# Patient Record
Sex: MALE | Race: ASIAN | NOT HISPANIC OR LATINO | Employment: UNEMPLOYED | URBAN - METROPOLITAN AREA
[De-identification: names, ages, dates, MRNs, and addresses within clinical notes are randomized per-mention and may not be internally consistent; named-entity substitution may affect disease eponyms.]

---

## 2018-04-03 LAB
ABSOL LYMPHOCYTES (HISTORICAL): 1.9 K/UL (ref 0.5–4)
ALBUMIN SERPL BCP-MCNC: 4.5 G/DL (ref 3–5.2)
ALP SERPL-CCNC: 56 U/L (ref 43–122)
ALT SERPL W P-5'-P-CCNC: 33 U/L (ref 9–52)
AMPHETAMINE URINE (HISTORICAL): NEGATIVE
ANION GAP SERPL CALCULATED.3IONS-SCNC: 13 MMOL/L (ref 5–14)
AST SERPL W P-5'-P-CCNC: 35 U/L (ref 17–59)
BARBITURATE URINE (HISTORICAL): NEGATIVE
BASOPHILS # BLD AUTO: 0.1 K/UL (ref 0–0.1)
BASOPHILS # BLD AUTO: 1 % (ref 0–1)
BENZODIAZEPINE URINE (HISTORICAL): NEGATIVE
BILIRUB SERPL-MCNC: 0.5 MG/DL
BILIRUB UR QL STRIP: NEGATIVE MG/DL
BUN SERPL-MCNC: 9 MG/DL (ref 5–25)
CALCIUM SERPL-MCNC: 9.5 MG/DL (ref 8.4–10.2)
CHLORIDE SERPL-SCNC: 97 MEQ/L (ref 97–108)
CK SERPL-CCNC: 312 U/L (ref 55–170)
CLARITY UR: CLEAR
CO2 SERPL-SCNC: 24 MMOL/L (ref 22–30)
COCAINE (METAB.), URINE (HISTORICAL): NEGATIVE
COLOR UR: NORMAL
CREATINE, SERUM (HISTORICAL): 0.73 MG/DL (ref 0.7–1.5)
DEPRECATED RDW RBC AUTO: 12.6 %
DRUG COMMENT (HISTORICAL): NORMAL
EGFR (HISTORICAL): >60 ML/MIN/1.73 M2
EOSINOPHIL # BLD AUTO: 0.1 K/UL (ref 0–0.4)
EOSINOPHIL NFR BLD AUTO: 2 % (ref 0–6)
GLUCOSE SERPL-MCNC: 67 MG/DL (ref 70–99)
GLUCOSE UR STRIP-MCNC: NEGATIVE MG/DL
HCT VFR BLD AUTO: 42.8 % (ref 41–53)
HGB BLD-MCNC: 14.3 G/DL (ref 13.5–17.5)
HGB UR QL STRIP.AUTO: NEGATIVE
KETONES UR STRIP-MCNC: NEGATIVE MG/DL
LEUKOCYTE ESTERASE UR QL STRIP: NEGATIVE
LYMPHOCYTES NFR BLD AUTO: 31 % (ref 25–45)
MCH RBC QN AUTO: 31.6 PG (ref 26–34)
MCHC RBC AUTO-ENTMCNC: 33.5 % (ref 31–36)
MCV RBC AUTO: 94 FL (ref 80–100)
MDMA (GC/MS) (HISTORICAL): NEGATIVE
METHADONE URINE (HISTORICAL): NEGATIVE
METHAMPHETAMINE URINE (HISTORICAL): NEGATIVE
METHYL ALCOHOL (HISTORICAL): 13 MG/DL
MONOCYTES # BLD AUTO: 0.6 K/UL (ref 0.2–0.9)
MONOCYTES NFR BLD AUTO: 9 % (ref 1–10)
NEUTROPHILS ABS COUNT (HISTORICAL): 3.4 K/UL (ref 1.8–7.8)
NEUTS SEG NFR BLD AUTO: 57 % (ref 45–65)
NITRITE UR QL STRIP: NEGATIVE
OPIATES (HISTORICAL): NEGATIVE
OXYCODONE (HISTORICAL): NEGATIVE
PH UR STRIP.AUTO: 7 [PH] (ref 4.5–8)
PHENCYCLIDINE URINE (HISTORICAL): NEGATIVE
PLATELET # BLD AUTO: 202 K/MCL (ref 150–450)
POTASSIUM SERPL-SCNC: 3.5 MEQ/L (ref 3.6–5)
PROT UR STRIP-MCNC: NEGATIVE MG/DL
RBC # BLD AUTO: 4.54 M/MCL (ref 4.5–5.9)
SODIUM SERPL-SCNC: 134 MEQ/L (ref 137–147)
SP GR UR STRIP.AUTO: 1.01 (ref 1–1.04)
THC URINE (HISTORICAL): NEGATIVE
TOTAL PROTEIN (HISTORICAL): 6.6 G/DL (ref 5.9–8.4)
TRICYCLICS URINE (HISTORICAL): NEGATIVE
UROBILINOGEN UR QL STRIP.AUTO: NEGATIVE MG/DL (ref 0–1)
WBC # BLD AUTO: 6 K/MCL (ref 4.5–11)

## 2019-07-04 ENCOUNTER — TELEPHONE (OUTPATIENT)
Dept: OTHER | Facility: OTHER | Age: 24
End: 2019-07-04

## 2019-07-04 NOTE — TELEPHONE ENCOUNTER
Mr  Diana Patel ( 1995) was admitted to [facility] at [facility] in [place] earlier today  Mr Raphael Flores was calling looking to speak with Dr Eneida Fowler who was listed on his card as his contact MD  Informed Mr Arnold Meza that Dr Eneida Fowler is not on call today, and there was no documentation on him since 2018    Mr Barrett Derrick will try to contact Dr Eneida Fowler tomorrow,

## 2019-07-29 ENCOUNTER — HOSPITAL ENCOUNTER (EMERGENCY)
Facility: HOSPITAL | Age: 24
Discharge: HOME | End: 2019-07-29
Attending: EMERGENCY MEDICINE
Payer: COMMERCIAL

## 2019-07-29 VITALS
SYSTOLIC BLOOD PRESSURE: 124 MMHG | RESPIRATION RATE: 14 BRPM | TEMPERATURE: 98.3 F | DIASTOLIC BLOOD PRESSURE: 73 MMHG | WEIGHT: 130 LBS | OXYGEN SATURATION: 99 % | HEART RATE: 110 BPM

## 2019-07-29 DIAGNOSIS — Z76.0 MEDICATION REFILL: Primary | ICD-10-CM

## 2019-07-29 LAB
ETHANOL EXG-MCNC: 0 MG/DL
LITHIUM SERPL-SCNC: 0.9 MMOL/L (ref 0.5–1)

## 2019-07-29 PROCEDURE — 36415 COLL VENOUS BLD VENIPUNCTURE: CPT | Performed by: PHYSICIAN ASSISTANT

## 2019-07-29 PROCEDURE — 80178 ASSAY OF LITHIUM: CPT | Performed by: PHYSICIAN ASSISTANT

## 2019-07-29 PROCEDURE — 82075 ASSAY OF BREATH ETHANOL: CPT | Performed by: PHYSICIAN ASSISTANT

## 2019-07-29 PROCEDURE — 99282 EMERGENCY DEPT VISIT SF MDM: CPT

## 2019-07-29 PROCEDURE — 99283 EMERGENCY DEPT VISIT LOW MDM: CPT | Performed by: PHYSICIAN ASSISTANT

## 2019-07-29 RX ORDER — CLONAZEPAM 0.5 MG/1
0.5 TABLET ORAL 2 TIMES DAILY
Qty: 14 TABLET | Refills: 0 | Status: SHIPPED | OUTPATIENT
Start: 2019-07-29 | End: 2019-08-05

## 2019-07-29 RX ORDER — OLANZAPINE 5 MG/1
5 TABLET ORAL DAILY
COMMUNITY
End: 2022-05-25

## 2019-07-29 RX ORDER — LITHIUM CARBONATE 600 MG/1
600 CAPSULE ORAL 2 TIMES DAILY WITH MEALS
Qty: 14 CAPSULE | Refills: 0 | Status: SHIPPED | OUTPATIENT
Start: 2019-07-29 | End: 2019-08-05

## 2019-07-29 RX ORDER — OLANZAPINE 15 MG/1
15 TABLET ORAL
Qty: 7 TABLET | Refills: 0 | Status: SHIPPED | OUTPATIENT
Start: 2019-07-29 | End: 2019-08-05

## 2019-07-29 RX ORDER — OLANZAPINE 15 MG/1
15 TABLET ORAL
COMMUNITY
End: 2022-05-25

## 2019-07-29 RX ORDER — CLONAZEPAM 0.5 MG/1
0.5 TABLET ORAL 2 TIMES DAILY
COMMUNITY
End: 2022-05-25

## 2019-07-29 RX ORDER — LITHIUM CARBONATE 300 MG
600 TABLET ORAL 2 TIMES DAILY
COMMUNITY
End: 2022-05-25

## 2019-07-29 NOTE — ED NOTES
Patient requesting referrals for therapist and psychiatrist   [facility]   Patient denies suicidal and homicidal ideations at this time

## 2019-07-29 NOTE — ED PROVIDER NOTES
History  Chief Complaint   Patient presents with    Medication Refill     lithium 600mg BID, olanzapine 15mg HS, clonazepam 1mg BID  also would like outpatient referrals  denies SI/HI/hallucinations  This is a 54-year-old male patient who just moved back from [place]  He has a history of bipolar  States he is currently trying to find a doctor but does not have [address], [address] would like to see our crisis worker  She was consult  He denies any homicidal suicidal ideations no hallucinations  He calling take lithium 600 mg b i d ,olanzapine 15mg hs and clonazepam 0 5 mg b i d  All confirmed on his bottles  The PS  was utilized wound he did have his clonazepam 0 5 mg b i d  Refill 15 days ago he should be out  It is healing medication that was on there  No fever no chills no headache blurred vision double vision cough congestion sore throat nausea vomiting diarrhea abdominal pain no chest pain or shortness of breath  No urinary symptoms  He is not actively run out of his that he was medicine is not going through withdrawal           Prior to Admission Medications   Prescriptions Last Dose Informant Patient Reported? Taking? OLANZapine (ZyPREXA) 15 mg tablet  Self Yes Yes   Sig: Take 15 mg by mouth daily at bedtime   OLANZapine (ZyPREXA) 5 mg tablet  Self Yes Yes   Sig: Take 5 mg by mouth daily   clonazePAM (KlonoPIN) 0 5 mg tablet  Self Yes Yes   Sig: Take 0 5 mg by mouth 2 (two) times a day   lithium 300 MG tablet  Self Yes Yes   Sig: Take 600 mg by mouth 2 (two) times a day      Facility-Administered Medications: None       Past Medical History:   Diagnosis Date    Bipolar 1 disorder ([facility] )     Psychiatric disorder     Psychosis ([facility] )        History reviewed  No pertinent surgical history  History reviewed  No pertinent family history  I have reviewed and agree with the history as documented      Social History     Tobacco Use    Smoking status: Current Every Day Smoker     Packs/day: 0 20 Types: Cigarettes    Smokeless tobacco: Never Used   Substance Use Topics    Alcohol use: Yes    Drug use: Not Currently     Types: Marijuana        Review of Systems   All other systems reviewed and are negative  Physical Exam  Physical Exam   Constitutional: He appears well-developed and well-nourished  HENT:   Head: Normocephalic and atraumatic  Right Ear: External ear normal    Left Ear: External ear normal    Nose: Nose normal    Mouth/Throat: Oropharynx is clear and moist    Eyes: Pupils are equal, round, and reactive to light  Conjunctivae are normal    Neck: Normal range of motion  Neck supple  Cardiovascular: Normal rate and regular rhythm  Pulmonary/Chest: Effort normal and breath sounds normal    Abdominal: Soft  Bowel sounds are normal  There is no tenderness  Neurological: He is alert  He displays normal reflexes  No cranial nerve deficit or sensory deficit  He exhibits normal muscle tone  Coordination normal    Skin: Skin is warm  Psychiatric: He has a normal mood and affect  His speech is normal and behavior is normal  Judgment and thought content normal  His mood appears not anxious  He is not actively hallucinating  Cognition and memory are normal  He is attentive  Nursing note and vitals reviewed  Vital Signs  ED Triage Vitals [07/29/19 0933]   Temperature Pulse Respirations Blood Pressure SpO2   98 3 °F (36 8 °C) (!) 110 14 124/73 99 %      Temp Source Heart Rate Source Patient Position - Orthostatic VS BP Location FiO2 (%)   Oral Monitor -- Right arm --      Pain Score       3           Vitals:    07/29/19 0933   BP: 124/73   Pulse: (!) 110         Visual Acuity      ED Medications  Medications - No data to display    Diagnostic Studies  Results Reviewed     Procedure Component Value Units Date/Time    POCT alcohol breath test [[id number]]     Lab Status:  No result     Lithium level [[id number]] Collected:  07/29/19 1005    Lab Status:   In process Specimen:  Blood from Arm, Right Updated:  07/29/19 1008                 No orders to display              Procedures  Procedures       ED Course  ED Course as of Jul 29 1024   Mon Jul 29, 2019   1022 Patient seen and evaluated by crisis  Was given outpatient follow-up  Not homicidal or suicidal                                  MDM    Disposition  Final diagnoses:   Medication refill     Time reflects when diagnosis was documented in both MDM as applicable and the Disposition within this note     Time User Action Codes Description Comment    7/29/2019 10:03 AM Alejobolivar Alex Add [Z76 0] Medication refill       ED Disposition     ED Disposition Condition Date/Time Comment    Discharge Stable Mon Jul 29, 2019 10:23 AM Wale Quinteros discharge to home/self care  Follow-up Information    None         Patient's Medications   Discharge Prescriptions    CLONAZEPAM (KLONOPIN) 0 5 MG TABLET    Take 1 tablet (0 5 mg total) by mouth 2 (two) times a day for 7 days       Start Date: 7/29/2019 End Date: 8/5/2019       Order Dose: 0 5 mg       Quantity: 14 tablet    Refills: 0    LITHIUM 600 MG CAPSULE    Take 1 capsule (600 mg total) by mouth 2 (two) times a day with meals for 7 days       Start Date: 7/29/2019 End Date: 8/5/2019       Order Dose: 600 mg       Quantity: 14 capsule    Refills: 0    OLANZAPINE (ZYPREXA) 15 MG TABLET    Take 1 tablet (15 mg total) by mouth daily at bedtime for 7 days       Start Date: 7/29/2019 End Date: 8/5/2019       Order Dose: 15 mg       Quantity: 7 tablet    Refills: 0     No discharge procedures on file      ED Provider  Electronically Signed by           Issa Marroquin PA-C  07/29/19 1024

## 2021-11-27 ENCOUNTER — HOSPITAL ENCOUNTER (EMERGENCY)
Facility: HOSPITAL | Age: 26
Discharge: HOME | End: 2021-11-28
Attending: EMERGENCY MEDICINE
Payer: COMMERCIAL

## 2021-11-27 DIAGNOSIS — F31.9 BIPOLAR DISORDER (HCC): Primary | ICD-10-CM

## 2021-11-27 PROCEDURE — 99285 EMERGENCY DEPT VISIT HI MDM: CPT

## 2021-11-27 PROCEDURE — 96372 THER/PROPH/DIAG INJ SC/IM: CPT

## 2021-11-27 RX ORDER — BENZTROPINE MESYLATE 1 MG/ML
2 INJECTION INTRAMUSCULAR; INTRAVENOUS ONCE
Status: COMPLETED | OUTPATIENT
Start: 2021-11-28 | End: 2021-11-27

## 2021-11-27 RX ORDER — LORAZEPAM 2 MG/ML
2 INJECTION INTRAMUSCULAR ONCE
Status: COMPLETED | OUTPATIENT
Start: 2021-11-28 | End: 2021-11-27

## 2021-11-27 RX ORDER — HALOPERIDOL 5 MG/ML
5 INJECTION INTRAMUSCULAR ONCE
Status: COMPLETED | OUTPATIENT
Start: 2021-11-28 | End: 2021-11-27

## 2021-11-27 RX ADMIN — HALOPERIDOL LACTATE 5 MG: 5 INJECTION, SOLUTION INTRAMUSCULAR at 23:58

## 2021-11-27 RX ADMIN — LORAZEPAM 2 MG: 2 INJECTION INTRAMUSCULAR; INTRAVENOUS at 23:59

## 2021-11-27 RX ADMIN — BENZTROPINE MESYLATE 2 MG: 1 INJECTION INTRAMUSCULAR; INTRAVENOUS at 23:59

## 2021-11-28 VITALS
DIASTOLIC BLOOD PRESSURE: 58 MMHG | SYSTOLIC BLOOD PRESSURE: 97 MMHG | HEART RATE: 78 BPM | RESPIRATION RATE: 15 BRPM | OXYGEN SATURATION: 100 % | TEMPERATURE: 98.4 F

## 2021-11-28 LAB
ALBUMIN SERPL BCP-MCNC: 4.1 G/DL (ref 3.5–5)
ALP SERPL-CCNC: 67 U/L (ref 46–116)
ALT SERPL W P-5'-P-CCNC: 20 U/L (ref 12–78)
AMPHETAMINES SERPL QL SCN: NEGATIVE
ANION GAP SERPL CALCULATED.3IONS-SCNC: 11 MMOL/L (ref 4–13)
APAP SERPL-MCNC: 6.9 UG/ML (ref 10–20)
AST SERPL W P-5'-P-CCNC: 26 U/L (ref 5–45)
BARBITURATES UR QL: NEGATIVE
BASOPHILS # BLD AUTO: 0.06 THOUSANDS/ΜL (ref 0–0.1)
BASOPHILS NFR BLD AUTO: 1 % (ref 0–1)
BENZODIAZ UR QL: NEGATIVE
BILIRUB SERPL-MCNC: 0.43 MG/DL (ref 0.2–1)
BUN SERPL-MCNC: 11 MG/DL (ref 5–25)
CALCIUM SERPL-MCNC: 8.7 MG/DL (ref 8.3–10.1)
CHLORIDE SERPL-SCNC: 105 MMOL/L (ref 100–108)
CK MB SERPL-MCNC: 1.2 NG/ML (ref 0–5)
CK MB SERPL-MCNC: 1.3 NG/ML (ref 0–5)
CK MB SERPL-MCNC: <1 % (ref 0–2.5)
CK MB SERPL-MCNC: <1 % (ref 0–2.5)
CK SERPL-CCNC: 588 U/L (ref 39–308)
CK SERPL-CCNC: 666 U/L (ref 39–308)
CO2 SERPL-SCNC: 26 MMOL/L (ref 21–32)
COCAINE UR QL: NEGATIVE
CREAT SERPL-MCNC: 1.14 MG/DL (ref 0.6–1.3)
EOSINOPHIL # BLD AUTO: 0.08 THOUSAND/ΜL (ref 0–0.61)
EOSINOPHIL NFR BLD AUTO: 1 % (ref 0–6)
ERYTHROCYTE [DISTWIDTH] IN BLOOD BY AUTOMATED COUNT: 11.6 % (ref 11.6–15.1)
ETHANOL SERPL-MCNC: 3 MG/DL (ref 0–3)
FLUAV RNA RESP QL NAA+PROBE: NEGATIVE
FLUBV RNA RESP QL NAA+PROBE: NEGATIVE
GFR SERPL CREATININE-BSD FRML MDRD: 88 ML/MIN/1.73SQ M
GLUCOSE SERPL-MCNC: 99 MG/DL (ref 65–140)
HCT VFR BLD AUTO: 43.2 % (ref 36.5–49.3)
HGB BLD-MCNC: 14.8 G/DL (ref 12–17)
IMM GRANULOCYTES # BLD AUTO: 0.02 THOUSAND/UL (ref 0–0.2)
IMM GRANULOCYTES NFR BLD AUTO: 0 % (ref 0–2)
LYMPHOCYTES # BLD AUTO: 0.97 THOUSANDS/ΜL (ref 0.6–4.47)
LYMPHOCYTES NFR BLD AUTO: 10 % (ref 14–44)
MCH RBC QN AUTO: 31.2 PG (ref 26.8–34.3)
MCHC RBC AUTO-ENTMCNC: 34.3 G/DL (ref 31.4–37.4)
MCV RBC AUTO: 91 FL (ref 82–98)
METHADONE UR QL: NEGATIVE
MONOCYTES # BLD AUTO: 0.68 THOUSAND/ΜL (ref 0.17–1.22)
MONOCYTES NFR BLD AUTO: 7 % (ref 4–12)
NEUTROPHILS # BLD AUTO: 8.3 THOUSANDS/ΜL (ref 1.85–7.62)
NEUTS SEG NFR BLD AUTO: 81 % (ref 43–75)
NRBC BLD AUTO-RTO: 0 /100 WBCS
OPIATES UR QL SCN: NEGATIVE
OXYCODONE+OXYMORPHONE UR QL SCN: NEGATIVE
PCP UR QL: NEGATIVE
PLATELET # BLD AUTO: 202 THOUSANDS/UL (ref 149–390)
PMV BLD AUTO: 9.5 FL (ref 8.9–12.7)
POTASSIUM SERPL-SCNC: 4.1 MMOL/L (ref 3.5–5.3)
PROT SERPL-MCNC: 6.4 G/DL (ref 6.4–8.2)
RBC # BLD AUTO: 4.75 MILLION/UL (ref 3.88–5.62)
RSV RNA RESP QL NAA+PROBE: NEGATIVE
SALICYLATES SERPL-MCNC: <3 MG/DL (ref 3–20)
SARS-COV-2 RNA RESP QL NAA+PROBE: NEGATIVE
SODIUM SERPL-SCNC: 142 MMOL/L (ref 136–145)
THC UR QL: POSITIVE
VALPROATE SERPL-MCNC: 6 UG/ML (ref 50–100)
WBC # BLD AUTO: 10.11 THOUSAND/UL (ref 4.31–10.16)

## 2021-11-28 PROCEDURE — 82550 ASSAY OF CK (CPK): CPT | Performed by: EMERGENCY MEDICINE

## 2021-11-28 PROCEDURE — 80179 DRUG ASSAY SALICYLATE: CPT | Performed by: EMERGENCY MEDICINE

## 2021-11-28 PROCEDURE — 80307 DRUG TEST PRSMV CHEM ANLYZR: CPT | Performed by: EMERGENCY MEDICINE

## 2021-11-28 PROCEDURE — 82077 ASSAY SPEC XCP UR&BREATH IA: CPT | Performed by: EMERGENCY MEDICINE

## 2021-11-28 PROCEDURE — 36415 COLL VENOUS BLD VENIPUNCTURE: CPT | Performed by: EMERGENCY MEDICINE

## 2021-11-28 PROCEDURE — 0241U HB NFCT DS VIR RESP RNA 4 TRGT: CPT | Performed by: EMERGENCY MEDICINE

## 2021-11-28 PROCEDURE — 99285 EMERGENCY DEPT VISIT HI MDM: CPT | Performed by: EMERGENCY MEDICINE

## 2021-11-28 PROCEDURE — 82553 CREATINE MB FRACTION: CPT | Performed by: EMERGENCY MEDICINE

## 2021-11-28 PROCEDURE — 85025 COMPLETE CBC W/AUTO DIFF WBC: CPT | Performed by: EMERGENCY MEDICINE

## 2021-11-28 PROCEDURE — 80164 ASSAY DIPROPYLACETIC ACD TOT: CPT | Performed by: EMERGENCY MEDICINE

## 2021-11-28 PROCEDURE — 96360 HYDRATION IV INFUSION INIT: CPT

## 2021-11-28 PROCEDURE — 80053 COMPREHEN METABOLIC PANEL: CPT | Performed by: EMERGENCY MEDICINE

## 2021-11-28 PROCEDURE — 80143 DRUG ASSAY ACETAMINOPHEN: CPT | Performed by: EMERGENCY MEDICINE

## 2021-11-28 RX ORDER — LORAZEPAM 1 MG/1
2 TABLET ORAL ONCE
Status: COMPLETED | OUTPATIENT
Start: 2021-11-28 | End: 2021-11-28

## 2021-11-28 RX ADMIN — SODIUM CHLORIDE 1000 ML: 0.9 INJECTION, SOLUTION INTRAVENOUS at 02:23

## 2021-11-28 RX ADMIN — LORAZEPAM 2 MG: 1 TABLET ORAL at 10:03

## 2022-04-17 ENCOUNTER — HOSPITAL ENCOUNTER (EMERGENCY)
Facility: HOSPITAL | Age: 27
End: 2022-04-18
Attending: EMERGENCY MEDICINE | Admitting: EMERGENCY MEDICINE
Payer: COMMERCIAL

## 2022-04-17 DIAGNOSIS — F29 PSYCHOSIS (HCC): ICD-10-CM

## 2022-04-17 DIAGNOSIS — R45.851 SUICIDAL IDEATIONS: ICD-10-CM

## 2022-04-17 DIAGNOSIS — R45.1 AGITATION: Primary | ICD-10-CM

## 2022-04-17 LAB
AMPHETAMINES SERPL QL SCN: NEGATIVE
BARBITURATES UR QL: NEGATIVE
BENZODIAZ UR QL: NEGATIVE
COCAINE UR QL: NEGATIVE
ETHANOL EXG-MCNC: 0 MG/DL
FLUAV RNA RESP QL NAA+PROBE: NEGATIVE
FLUBV RNA RESP QL NAA+PROBE: NEGATIVE
METHADONE UR QL: NEGATIVE
OPIATES UR QL SCN: NEGATIVE
OXYCODONE+OXYMORPHONE UR QL SCN: NEGATIVE
PCP UR QL: NEGATIVE
RSV RNA RESP QL NAA+PROBE: NEGATIVE
SARS-COV-2 RNA RESP QL NAA+PROBE: NEGATIVE
THC UR QL: POSITIVE

## 2022-04-17 PROCEDURE — 0241U HB NFCT DS VIR RESP RNA 4 TRGT: CPT | Performed by: EMERGENCY MEDICINE

## 2022-04-17 PROCEDURE — 80307 DRUG TEST PRSMV CHEM ANLYZR: CPT | Performed by: EMERGENCY MEDICINE

## 2022-04-17 PROCEDURE — 99285 EMERGENCY DEPT VISIT HI MDM: CPT

## 2022-04-17 PROCEDURE — 99284 EMERGENCY DEPT VISIT MOD MDM: CPT | Performed by: EMERGENCY MEDICINE

## 2022-04-17 PROCEDURE — 82075 ASSAY OF BREATH ETHANOL: CPT | Performed by: EMERGENCY MEDICINE

## 2022-04-17 RX ORDER — OLANZAPINE 5 MG/1
10 TABLET, ORALLY DISINTEGRATING ORAL ONCE
Status: COMPLETED | OUTPATIENT
Start: 2022-04-17 | End: 2022-04-17

## 2022-04-17 RX ORDER — LORAZEPAM 1 MG/1
1 TABLET ORAL ONCE
Status: COMPLETED | OUTPATIENT
Start: 2022-04-17 | End: 2022-04-17

## 2022-04-17 RX ADMIN — OLANZAPINE 10 MG: 5 TABLET, ORALLY DISINTEGRATING ORAL at 12:01

## 2022-04-17 RX ADMIN — OLANZAPINE 10 MG: 5 TABLET, ORALLY DISINTEGRATING ORAL at 18:53

## 2022-04-17 RX ADMIN — LORAZEPAM 1 MG: 1 TABLET ORAL at 18:53
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
## 2022-04-17 NOTE — ED NOTES
While receiving report, RN heard pt begin to scream/yell while laying in bed  Pt jumped out of bed and walked in hallway and continued to yell/scream  Pt did not display any physically aggressive behavior towards self/others  Provider and security called to bedside at this time        John Alvarado RN  04/17/22 1943

## 2022-04-17 NOTE — ED NOTES
Pt occasionally yelling out but laying calmly in bed  Breathing non-labored  No signs of distress        Cydney Levine RN  04/17/22 1944

## 2022-04-17 NOTE — ED NOTES
CW aware of patient and will do evaluation as soon as possible        Keisha Leigh RN  04/17/22 9034

## 2022-04-17 NOTE — ED NOTES
Pt was agreeable to going back to room and taking po medications  Pt was very cooperative with staff, but continued to yell       Aubree Fernández RN  04/17/22 1944

## 2022-04-18 ENCOUNTER — HOSPITAL ENCOUNTER (INPATIENT)
Facility: HOSPITAL | Age: 27
LOS: 37 days | Discharge: HOME | DRG: 750 | End: 2022-05-25
Attending: HOSPITALIST | Admitting: HOSPITALIST
Payer: COMMERCIAL

## 2022-04-18 VITALS
RESPIRATION RATE: 16 BRPM | HEART RATE: 80 BPM | SYSTOLIC BLOOD PRESSURE: 136 MMHG | DIASTOLIC BLOOD PRESSURE: 83 MMHG | TEMPERATURE: 98 F | OXYGEN SATURATION: 100 %

## 2022-04-18 DIAGNOSIS — E53.8 VITAMIN B12 DEFICIENCY: ICD-10-CM

## 2022-04-18 DIAGNOSIS — R45.1 AGITATION: ICD-10-CM

## 2022-04-18 DIAGNOSIS — F29 PSYCHOSIS (HCC): ICD-10-CM

## 2022-04-18 DIAGNOSIS — Z72.0 TOBACCO ABUSE: ICD-10-CM

## 2022-04-18 DIAGNOSIS — F25.0 SCHIZOAFFECTIVE DISORDER, BIPOLAR TYPE (HCC): Primary | Chronic | ICD-10-CM

## 2022-04-18 DIAGNOSIS — E55.9 VITAMIN D DEFICIENCY: ICD-10-CM

## 2022-04-18 DIAGNOSIS — I10 HYPERTENSION: ICD-10-CM

## 2022-04-18 RX ORDER — CLONAZEPAM 0.5 MG/1
0.5 TABLET ORAL 2 TIMES DAILY
Status: CANCELLED | OUTPATIENT
Start: 2022-04-18

## 2022-04-18 RX ORDER — HALOPERIDOL 5 MG
5 TABLET ORAL
Status: DISCONTINUED | OUTPATIENT
Start: 2022-04-18 | End: 2022-04-19

## 2022-04-18 RX ORDER — HALOPERIDOL 5 MG/ML
2.5 INJECTION INTRAMUSCULAR
Status: DISCONTINUED | OUTPATIENT
Start: 2022-04-18 | End: 2022-05-25 | Stop reason: HOSPADM

## 2022-04-18 RX ORDER — HALOPERIDOL 5 MG/ML
5 INJECTION INTRAMUSCULAR
Status: DISCONTINUED | OUTPATIENT
Start: 2022-04-18 | End: 2022-04-19

## 2022-04-18 RX ORDER — CLONAZEPAM 0.5 MG/1
0.5 TABLET ORAL 2 TIMES DAILY
Status: DISCONTINUED | OUTPATIENT
Start: 2022-04-18 | End: 2022-04-18 | Stop reason: HOSPADM

## 2022-04-18 RX ORDER — ACETAMINOPHEN 325 MG/1
650 TABLET ORAL EVERY 6 HOURS PRN
Status: DISCONTINUED | OUTPATIENT
Start: 2022-04-18 | End: 2022-05-25 | Stop reason: HOSPADM

## 2022-04-18 RX ORDER — ACETAMINOPHEN 325 MG/1
975 TABLET ORAL EVERY 6 HOURS PRN
Status: DISCONTINUED | OUTPATIENT
Start: 2022-04-18 | End: 2022-05-25 | Stop reason: HOSPADM

## 2022-04-18 RX ORDER — OLANZAPINE 5 MG/1
5 TABLET ORAL DAILY
Status: DISCONTINUED | OUTPATIENT
Start: 2022-04-19 | End: 2022-04-19

## 2022-04-18 RX ORDER — LORAZEPAM 2 MG/ML
2 INJECTION INTRAMUSCULAR
Status: CANCELLED | OUTPATIENT
Start: 2022-04-18

## 2022-04-18 RX ORDER — TRAZODONE HYDROCHLORIDE 100 MG/1
100 TABLET ORAL
Status: CANCELLED | OUTPATIENT
Start: 2022-04-18

## 2022-04-18 RX ORDER — LORAZEPAM 1 MG/1
1 TABLET ORAL EVERY 6 HOURS PRN
Status: DISCONTINUED | OUTPATIENT
Start: 2022-04-18 | End: 2022-05-25 | Stop reason: HOSPADM

## 2022-04-18 RX ORDER — BENZTROPINE MESYLATE 1 MG/1
1 TABLET ORAL EVERY 6 HOURS PRN
Status: CANCELLED | OUTPATIENT
Start: 2022-04-18

## 2022-04-18 RX ORDER — HYDROXYZINE HYDROCHLORIDE 25 MG/1
25 TABLET, FILM COATED ORAL
Status: CANCELLED | OUTPATIENT
Start: 2022-04-18

## 2022-04-18 RX ORDER — OLANZAPINE 5 MG/1
10 TABLET, ORALLY DISINTEGRATING ORAL ONCE
Status: COMPLETED | OUTPATIENT
Start: 2022-04-18 | End: 2022-04-18

## 2022-04-18 RX ORDER — ACETAMINOPHEN 325 MG/1
650 TABLET ORAL EVERY 4 HOURS PRN
Status: DISCONTINUED | OUTPATIENT
Start: 2022-04-18 | End: 2022-04-18 | Stop reason: HOSPADM

## 2022-04-18 RX ORDER — MAGNESIUM HYDROXIDE/ALUMINUM HYDROXICE/SIMETHICONE 120; 1200; 1200 MG/30ML; MG/30ML; MG/30ML
30 SUSPENSION ORAL EVERY 4 HOURS PRN
Status: DISCONTINUED | OUTPATIENT
Start: 2022-04-18 | End: 2022-05-25 | Stop reason: HOSPADM

## 2022-04-18 RX ORDER — HYDROXYZINE 50 MG/1
50 TABLET, FILM COATED ORAL
Status: DISCONTINUED | OUTPATIENT
Start: 2022-04-18 | End: 2022-05-25 | Stop reason: HOSPADM

## 2022-04-18 RX ORDER — BENZTROPINE MESYLATE 1 MG/1
1 TABLET ORAL EVERY 6 HOURS PRN
Status: DISCONTINUED | OUTPATIENT
Start: 2022-04-18 | End: 2022-05-25 | Stop reason: HOSPADM

## 2022-04-18 RX ORDER — ACETAMINOPHEN 325 MG/1
650 TABLET ORAL EVERY 4 HOURS PRN
Status: DISCONTINUED | OUTPATIENT
Start: 2022-04-18 | End: 2022-05-25 | Stop reason: HOSPADM

## 2022-04-18 RX ORDER — HALOPERIDOL 2 MG/1
2 TABLET ORAL
Status: DISCONTINUED | OUTPATIENT
Start: 2022-04-18 | End: 2022-04-19

## 2022-04-18 RX ORDER — BENZTROPINE MESYLATE 1 MG/ML
1 INJECTION INTRAMUSCULAR; INTRAVENOUS
Status: CANCELLED | OUTPATIENT
Start: 2022-04-18

## 2022-04-18 RX ORDER — LORAZEPAM 2 MG/ML
2 INJECTION INTRAMUSCULAR
Status: DISCONTINUED | OUTPATIENT
Start: 2022-04-18 | End: 2022-05-25 | Stop reason: HOSPADM

## 2022-04-18 RX ORDER — OLANZAPINE 10 MG/1
10 TABLET ORAL
Status: DISCONTINUED | OUTPATIENT
Start: 2022-04-19 | End: 2022-04-27

## 2022-04-18 RX ORDER — BENZTROPINE MESYLATE 1 MG/ML
1 INJECTION INTRAMUSCULAR; INTRAVENOUS
Status: DISCONTINUED | OUTPATIENT
Start: 2022-04-18 | End: 2022-05-25 | Stop reason: HOSPADM

## 2022-04-18 RX ORDER — OLANZAPINE 5 MG/1
5 TABLET ORAL DAILY
Status: CANCELLED | OUTPATIENT
Start: 2022-04-19

## 2022-04-18 RX ORDER — LORAZEPAM 1 MG/1
1 TABLET ORAL EVERY 6 HOURS PRN
Status: CANCELLED | OUTPATIENT
Start: 2022-04-18

## 2022-04-18 RX ORDER — POLYETHYLENE GLYCOL 3350 17 G/17G
17 POWDER, FOR SOLUTION ORAL DAILY PRN
Status: CANCELLED | OUTPATIENT
Start: 2022-04-18

## 2022-04-18 RX ORDER — POLYETHYLENE GLYCOL 3350 17 G/17G
17 POWDER, FOR SOLUTION ORAL DAILY PRN
Status: DISCONTINUED | OUTPATIENT
Start: 2022-04-18 | End: 2022-05-25 | Stop reason: HOSPADM

## 2022-04-18 RX ORDER — HALOPERIDOL 5 MG/ML
2.5 INJECTION INTRAMUSCULAR
Status: CANCELLED | OUTPATIENT
Start: 2022-04-18

## 2022-04-18 RX ORDER — ACETAMINOPHEN 325 MG/1
650 TABLET ORAL EVERY 6 HOURS PRN
Status: CANCELLED | OUTPATIENT
Start: 2022-04-18

## 2022-04-18 RX ORDER — CLONAZEPAM 0.5 MG/1
0.5 TABLET ORAL 2 TIMES DAILY
Status: DISCONTINUED | OUTPATIENT
Start: 2022-04-19 | End: 2022-04-26

## 2022-04-18 RX ORDER — LORAZEPAM 2 MG/ML
1 INJECTION INTRAMUSCULAR
Status: CANCELLED | OUTPATIENT
Start: 2022-04-18

## 2022-04-18 RX ORDER — LORAZEPAM 1 MG/1
2 TABLET ORAL ONCE
Status: COMPLETED | OUTPATIENT
Start: 2022-04-18 | End: 2022-04-18

## 2022-04-18 RX ORDER — ACETAMINOPHEN 325 MG/1
975 TABLET ORAL EVERY 6 HOURS PRN
Status: CANCELLED | OUTPATIENT
Start: 2022-04-18

## 2022-04-18 RX ORDER — HALOPERIDOL 5 MG
5 TABLET ORAL
Status: CANCELLED | OUTPATIENT
Start: 2022-04-18

## 2022-04-18 RX ORDER — LITHIUM CARBONATE 300 MG/1
300 TABLET, FILM COATED, EXTENDED RELEASE ORAL EVERY 12 HOURS SCHEDULED
Status: CANCELLED | OUTPATIENT
Start: 2022-04-18

## 2022-04-18 RX ORDER — BENZTROPINE MESYLATE 1 MG/ML
0.5 INJECTION INTRAMUSCULAR; INTRAVENOUS
Status: DISCONTINUED | OUTPATIENT
Start: 2022-04-18 | End: 2022-05-25 | Stop reason: HOSPADM

## 2022-04-18 RX ORDER — HYDROXYZINE HYDROCHLORIDE 25 MG/1
25 TABLET, FILM COATED ORAL
Status: DISCONTINUED | OUTPATIENT
Start: 2022-04-18 | End: 2022-05-25 | Stop reason: HOSPADM

## 2022-04-18 RX ORDER — HALOPERIDOL 1 MG/1
2 TABLET ORAL
Status: CANCELLED | OUTPATIENT
Start: 2022-04-18

## 2022-04-18 RX ORDER — LORAZEPAM 2 MG/ML
1 INJECTION INTRAMUSCULAR EVERY 4 HOURS PRN
Status: DISCONTINUED | OUTPATIENT
Start: 2022-04-18 | End: 2022-05-25 | Stop reason: HOSPADM

## 2022-04-18 RX ORDER — OLANZAPINE 5 MG/1
5 TABLET ORAL DAILY
Status: DISCONTINUED | OUTPATIENT
Start: 2022-04-18 | End: 2022-04-18 | Stop reason: HOSPADM

## 2022-04-18 RX ORDER — LITHIUM CARBONATE 300 MG/1
300 TABLET, FILM COATED, EXTENDED RELEASE ORAL EVERY 12 HOURS SCHEDULED
Status: DISCONTINUED | OUTPATIENT
Start: 2022-04-19 | End: 2022-04-21

## 2022-04-18 RX ORDER — OLANZAPINE 5 MG/1
10 TABLET ORAL
Status: DISCONTINUED | OUTPATIENT
Start: 2022-04-18 | End: 2022-04-18 | Stop reason: HOSPADM

## 2022-04-18 RX ORDER — LORAZEPAM 2 MG/ML
1 INJECTION INTRAMUSCULAR
Status: DISCONTINUED | OUTPATIENT
Start: 2022-04-18 | End: 2022-05-25 | Stop reason: HOSPADM

## 2022-04-18 RX ORDER — OLANZAPINE 5 MG/1
10 TABLET ORAL
Status: CANCELLED | OUTPATIENT
Start: 2022-04-18

## 2022-04-18 RX ORDER — ACETAMINOPHEN 325 MG/1
650 TABLET ORAL EVERY 4 HOURS PRN
Status: CANCELLED | OUTPATIENT
Start: 2022-04-18

## 2022-04-18 RX ORDER — BENZTROPINE MESYLATE 1 MG/ML
0.5 INJECTION INTRAMUSCULAR; INTRAVENOUS
Status: CANCELLED | OUTPATIENT
Start: 2022-04-18

## 2022-04-18 RX ORDER — HALOPERIDOL 5 MG/ML
5 INJECTION INTRAMUSCULAR
Status: CANCELLED | OUTPATIENT
Start: 2022-04-18

## 2022-04-18 RX ORDER — LITHIUM CARBONATE 300 MG/1
300 TABLET, FILM COATED, EXTENDED RELEASE ORAL EVERY 12 HOURS SCHEDULED
Status: DISCONTINUED | OUTPATIENT
Start: 2022-04-18 | End: 2022-04-18 | Stop reason: HOSPADM

## 2022-04-18 RX ORDER — LORAZEPAM 2 MG/ML
1 INJECTION INTRAMUSCULAR EVERY 4 HOURS PRN
Status: CANCELLED | OUTPATIENT
Start: 2022-04-18

## 2022-04-18 RX ORDER — HYDROXYZINE HYDROCHLORIDE 25 MG/1
50 TABLET, FILM COATED ORAL
Status: CANCELLED | OUTPATIENT
Start: 2022-04-18

## 2022-04-18 RX ORDER — TRAZODONE HYDROCHLORIDE 50 MG/1
100 TABLET ORAL
Status: DISCONTINUED | OUTPATIENT
Start: 2022-04-18 | End: 2022-05-03

## 2022-04-18 RX ORDER — MAGNESIUM HYDROXIDE/ALUMINUM HYDROXICE/SIMETHICONE 120; 1200; 1200 MG/30ML; MG/30ML; MG/30ML
30 SUSPENSION ORAL EVERY 4 HOURS PRN
Status: CANCELLED | OUTPATIENT
Start: 2022-04-18

## 2022-04-18 RX ADMIN — CLONAZEPAM 0.5 MG: 0.5 TABLET ORAL at 09:07

## 2022-04-18 RX ADMIN — TRAZODONE HYDROCHLORIDE 100 MG: 50 TABLET ORAL at 23:27

## 2022-04-18 RX ADMIN — OLANZAPINE 10 MG: 5 TABLET, ORALLY DISINTEGRATING ORAL at 03:56

## 2022-04-18 RX ADMIN — LITHIUM CARBONATE 300 MG: 300 TABLET, FILM COATED, EXTENDED RELEASE ORAL at 20:09

## 2022-04-18 RX ADMIN — LORAZEPAM 2 MG: 1 TABLET ORAL at 03:56

## 2022-04-18 RX ADMIN — BENZTROPINE MESYLATE 1 MG: 1 TABLET ORAL at 23:27

## 2022-04-18 RX ADMIN — HALOPERIDOL 2 MG: 2 TABLET ORAL at 23:25

## 2022-04-18 RX ADMIN — OLANZAPINE 10 MG: 5 TABLET, FILM COATED ORAL at 21:35

## 2022-04-18 RX ADMIN — ACETAMINOPHEN 325MG 650 MG: 325 TABLET ORAL at 12:44

## 2022-04-18 RX ADMIN — CLONAZEPAM 0.5 MG: 0.5 TABLET ORAL at 18:07

## 2022-04-18 RX ADMIN — LITHIUM CARBONATE 300 MG: 300 TABLET, FILM COATED, EXTENDED RELEASE ORAL at 09:06

## 2022-04-18 RX ADMIN — LORAZEPAM 1 MG: 1 TABLET ORAL at 23:27

## 2022-04-18 NOTE — ED RE-EVALUATION NOTE
30 yo M with bipolar depression under 302 for aggressive and erratic behavior, putting himself in danger  Care handed over to me for nightshift  He is not sleeping, pacing, and is agitated  He will periodically shout and hit the door  The goal is to treat him with medication for sleep         David Morton MD  04/18/22 1925

## 2022-04-18 NOTE — ED NOTES
Pt ambulatory to BR with steady gait  Pt requesting to speak with Crisis Worker prior to being transferred  Advised CW of same  Pt calm and agreeable at this time        Collins Hogan RN  04/18/22 6594

## 2022-04-18 NOTE — ED NOTES
Pt yelling, stating we are holding him here to long, asking to leave  Pt hitting the window despite this nurse asking him not to  Provider made aware and at bedside speaking to pt         Karrie William RN  04/18/22 9964

## 2022-04-18 NOTE — ED NOTES
Pt screaming at ED staff "[address] [facility] Alayna AVILA"      Derik Pompa RN  04/18/22 7222

## 2022-04-18 NOTE — ED NOTES
Pt ambulatory to BR  Advised pt we will be ordering dinner soon, pt states "I ain't eating no more! I don't want dinner  You can eat your words "    Pt came out of BR, started screaming obscenities at RN  Pt redirected to room 12        Rory Salazar RN  04/18/22 4987

## 2022-04-18 NOTE — ED NOTES
Pt provided with water as requested   Pt continues to be upset about being here "I'm wasting my time for no reason!"     Willian Torres, RN  04/18/22 5518

## 2022-04-18 NOTE — ED NOTES
Tierney Poster  is 31 y o , , single, unemployed male who was brought to ED by [employer] due to erratic behaviors out in the community  Daily Hanna seemed guarded at times and therefore, this evaluation was a bit limited  When asked about the presenting problem, he stated being "pissed off" due to coming across "show off fire guys "  Patient denied SI, but when asked about accessibility to guns/rifles, he stated, "I don't have a gun and it's good that I don't because I would use it to shoot myself " He also expressed some HI, explaining that he wishes to kill people who irritate him  He denied hallucinations, but staff reported observing patient responding to internal stimuli  Regarding substances, patient reported smoking marijuana sometimes  He did not identify current stressors  Patient lives with his mother, grandmother, and siblings  Patient denied access to guns  He reported no changes in appetite, nor sleep  He also denied legal issues  Regarding mental health treatment, patient reported a history of some OP services, but stated that he currently has no services in place  According to patient and Epic, he has a history of inpatient admissions due to similar presentations  Patient was 302'd earlier today      Vikki Jacinto, [place]  04/17/2022 8442

## 2022-04-18 NOTE — ED NOTES
Pt asks RN to come to his room  Pt calm and pleasant at this time  Pt asking if when he gets to the other facility "Can they put me on the Non Manic Side?"  Pt explains that is previous psychiatric admissions, he noticed that units were generally divided up into 2 sections:  People who need help, and People who just beat the hell out of each other  Pt states he feels a calmer section of the unit may benefit him so that his anxiety and manic energy are not increased  Advised pt that I would make note of his concerns  Pt expresses gratitude         Zita Mckeon, GIA  04/18/22 0133

## 2022-04-18 NOTE — ED NOTES
Pt standing at door yelling, hit the window  Provider made aware and spoke with pt  Pt frustrated about the intake process  Meal tray ordered        Desirae Small RN  04/18/22 6979

## 2022-04-18 NOTE — ED NOTES
Pt given tv remote and changed channel, then removed from room        Moses Whitaker RN  04/18/22 5636

## 2022-04-18 NOTE — ED NOTES
Pt standing at doorway, yelling "fuck you bitch!" wandering around room yelling and talking to himself       Imer Banerjee RN  04/18/22 6508

## 2022-04-18 NOTE — ED NOTES
Patient is accepted at [facility]  Patient is accepted by Dr rosado [address] is arranged with [facility]  Transportation is scheduled for TBD    Nurse report is to be called to  [phone]  prior to patient transfer

## 2022-04-18 NOTE — ED NOTES
Pt ambulated to bathroom and returned to room  Pt continues to stand inside room karynalling and cursing       Torres Calderon RN  04/18/22 5697
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
## 2022-04-18 NOTE — ED NOTES
Adult Bed Search:    Adam Juárez- spoke with Leopoldo Hales, no beds    Arluisa Fran- spoke with Jeannette Calderon, no beds    [place]-spoke with Maral Moe, no beds    Marcoscarol ann Kirbytle with Estela Velásquez, no beds     [facility]spoke with Duran Grossman, no beds     [facility]- called 3xs no answer, inpatient unit RN took message for call back      [place]-spoke with Bronson Lee, no beds     [facility]- spoke with Nelly, no beds      [facility]spoke with Dean [facility], no beds     [facility]-no answer, left message requesting call back    [place]- spoke with Rose John , no beds    [facility]-spoke with Carly , no beds    [place]-spoke with Pricila, no beds    [place]- spoke with Lm, no beds available    [place]- no answer, called multiple times    [place]- no answer, called multiple times

## 2022-04-18 NOTE — ED NOTES
Pt screaming and punching door  Margarita [place] RN at bedside to de-escalate patient behavior  Pt yelling at [facility], Disha Melissa RN informed patient this is inappropriate and he needs to lay down  Pt went to lay down in bed as RN's walked away, patient screams " I DONT [address]" at [facility]        Marian Thorpe RN  04/18/22 7917
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
## 2022-04-18 NOTE — ED NOTES
Pt asked to go to the bathroom, when patient was done in restroom pt came out yelling  This RN gave patient his medication with water and attempted to escort patient back to room  Pt began to yell at this RN as he slowly walked towards this RN  RN backed up and proceeded to open [facility] door to nursing desk for safety measures  Pt continued to stare at RN and other ED staff through window while screaming   RN was able to then escort patient to room where seclusion was still intact      Amadna Villar RN  04/18/22 4507

## 2022-04-18 NOTE — ED NOTES
Pt ambulated to bathroom and back to room  Pt more cooperative at this time  Pt requesting tylenol for headache, provider made aware       Lore Benitez RN  04/18/22 0372

## 2022-04-18 NOTE — EMTALA/ACUTE CARE TRANSFER
[facility]  [address] [facility] [address]  Dept: [phone]      EMTALA TRANSFER CONSENT    NAME Juany De Anda                                         1995                              MRN [id number]    I have been informed of my rights regarding examination, treatment, and transfer   by Dr Jasbir Mcneal DO    Benefits: Specialized equipment and/or services available at the receiving facility (Include comment)________________________ (Inpatient psychiatric treatment)    Risks: Potential for delay in receiving treatment,Potential deterioration of medical condition,Increased discomfort during transfer,Possible worsening of condition or death during transfer (Inpatient psychiatric treatment)      Consent for Transfer:  I acknowledge that my medical condition has been evaluated and explained to me by the emergency department physician or other qualified medical person and/or my attending physician, who has recommended that I be transferred to the service of  Accepting Physician: dr Tonie Butcher at [address] [facility] Name, [address] : [place]  The above potential benefits of such transfer, the potential risks associated with such transfer, and the probable risks of not being transferred have been explained to me, and I fully understand them  The doctor has explained that, in my case, the benefits of transfer outweigh the risks  I agree to be transferred  I authorize the performance of emergency medical procedures and treatments upon me in both transit and upon arrival at the receiving facility  Additionally, I authorize the release of any and all medical records to the receiving facility and request they be transported with me, if possible  I understand that the safest mode of transportation during a medical emergency is an ambulance and that the Hospital advocates the use of this mode of transport   Risks of traveling to the receiving facility by car, including absence of medical control, life sustaining equipment, such as oxygen, and medical personnel has been explained to me and I fully understand them  (KIRK CORRECT BOX BELOW)  [X]  I consent to the stated transfer and to be transported by ambulance/helicopter  [  ]  I consent to the stated transfer, but refuse transportation by ambulance and accept full responsibility for my transportation by car  I understand the risks of non-ambulance transfers and I exonerate the Hospital and its staff from any deterioration in my condition that results from this refusal     X___________________________________________    DATE  22  TIME________  Signature of patient or legally responsible individual signing on patient behalf           RELATIONSHIP TO PATIENT_________________________          Provider Certification    NAME Sheri Aden                                        [facility] 1995                              MRN [id number]    A medical screening exam was performed on the above named patient  Based on the examination:    Condition Necessitating Transfer The primary encounter diagnosis was Agitation  Diagnoses of Suicidal ideations and Psychosis ([facility] [address] ) were also pertinent to this visit      Patient Condition: The patient has been stabilized such that within reasonable medical probability, no material deterioration of the patient condition or the condition of the unborn child(dione) is likely to result from the transfer    Reason for Transfer: Level of Care needed not available at this facility (Inpatient psychiatric treatment)    Transfer Requirements: General acute hospital   · Space available and qualified personnel available for treatment as acknowledged by [employer] [phone]  · Agreed to accept transfer and to provide appropriate medical treatment as acknowledged by       dr Donald Frausto  · Appropriate medical records of the examination and treatment of the patient are provided at the time of transfer   STAFF INITIAL WHEN COMPLETED _______  · Transfer will be performed by qualified personnel from [facility]  and appropriate transfer equipment as required, including the use of necessary and appropriate life support measures  Provider Certification: I have examined the patient and explained the following risks and benefits of being transferred/refusing transfer to the patient/family:  General risk, such as traffic hazards, adverse weather conditions, rough terrain or turbulence, possible failure of equipment (including vehicle or aircraft), or consequences of actions of persons outside the control of the transport personnel,Unanticipated needs of medical equipment and personnel during transport,Risk of worsening condition,The possibility of a transport vehicle being unavailable      Based on these reasonable risks and benefits to the patient and/or the unborn child(dione), and based upon the information available at the time of the patients examination, I certify that the medical benefits reasonably to be expected from the provision of appropriate medical treatments at another medical facility outweigh the increasing risks, if any, to the individuals medical condition, and in the case of labor to the unborn child, from effecting the transfer      X____________________________________________ DATE 04/18/22        TIME_______      ORIGINAL - SEND TO MEDICAL RECORDS   COPY - SEND WITH PATIENT DURING TRANSFER

## 2022-04-18 NOTE — ED NOTES
Messaged on call CW via [employer] at this time about status of coming to do pt eval  Awaiting return message        Marcelo Abdalla RN  04/17/22 2115

## 2022-04-18 NOTE — ED NOTES
Pt screaming and yelling  1:1 approached patients door to ask if he needed something  Pt calmly requesting food and water   Staff obtaining items for patient      Joshua Arzola RN  04/18/22 2586

## 2022-04-18 NOTE — ED NOTES
Spoke with patient per his request  Patient asking about options  Crisis Worker explained to patient that he is in the hospital and going to a psychiatric facility on a 302 commitment  Patient reported that he had been told that by someone but did not report who  When patient was informed of time and location for transport patient said "then my option is to die", made his hand to look like a gun and placed it to his head  Patient declined further conversation with Crisis Worker at this time      Mariela Caceres  Crisis Worker, [place]  04/18/22

## 2022-04-18 NOTE — ED NOTES
Pt requesting to shower, 1:1 staff informed patient that due to time and patients sleeping he can shower in the AM and pt needs to refrain from yelling due to time of night     Kuldip Cross RN  04/18/22 0042
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
## 2022-04-18 NOTE — ED NOTES
Pt yelling "I need fresh air!"   Then yelling "I need water " and pounding door and window of room  RN to room 12 accompanied by [employer]  Pt redirectable, given fresh cup of water            García Lema RN  04/18/22 3826

## 2022-04-18 NOTE — ED NOTES
Pt standing at doorway repeatedly stating that he needs to walk  This nurse explained to pt that he must ambulate within his room  Pt unhappy with this instruction, yelling that we need to take care of him  Pt then returned to bed       Desirae Small RN  04/18/22 3887

## 2022-04-18 NOTE — ED NOTES
Patient asking RN to adjust bed for him, pt calm and cooperative with interaction   RN emptied trash from room and provided patient with more oral fluids      Falguni Arce RN  04/18/22 9353

## 2022-04-18 NOTE — ED NOTES
This writer completed consult with patient at 10:30pm and was not made aware that patient was being admitted on a 302 basis until consult was completed  This writer did explain rights to patient, but the consult was completed outside of the two hour window  There was also no crisis worker present during 302 processing  This writer reached out to Kerwin Aleman at [facility] crisis and explained situation  Kerwin Aleman explained that because 302 was completed within two hour window by physician, 302 would not be overturned  302 documents were faxed at this time       [address] [place], [place]  04/17/2022 8730

## 2022-04-18 NOTE — ED NOTES
Pt tapping on window, requesting drink at this time  Pt given [employer] per his request   Pt calm and cooperative at this time       Alexy Rodriguez RN  04/18/22 9194

## 2022-04-18 NOTE — ED NOTES
Pt ambulatory to BR again  Calm at this time    Given remote for TV per his request        Valente Mccabe, RN  04/18/22 8678

## 2022-04-18 NOTE — ED NOTES
RN assumed pt care at this time  Pt requesting food  RN brought pt turkey sandwich, pretzels and a soda        Vin Jiménez RN  04/18/22 1928

## 2022-04-18 NOTE — ED NOTES
Pt ambulated to bathroom and returned to room  Breakfast tray ordered        Charline Bedolla RN  04/18/22 3633

## 2022-04-19 PROBLEM — F31.63 BIPOLAR 1 DISORDER, MIXED, SEVERE (HCC): Chronic | Status: RESOLVED | Noted: 2022-04-19 | Resolved: 2022-04-19

## 2022-04-19 PROBLEM — F31.63 BIPOLAR 1 DISORDER, MIXED, SEVERE (HCC): Chronic | Status: ACTIVE | Noted: 2022-04-19

## 2022-04-19 PROBLEM — F25.0 SCHIZOAFFECTIVE DISORDER, BIPOLAR TYPE (HCC): Chronic | Status: ACTIVE | Noted: 2022-04-19

## 2022-04-19 PROCEDURE — 99221 1ST HOSP IP/OBS SF/LOW 40: CPT | Performed by: PSYCHIATRY & NEUROLOGY

## 2022-04-19 RX ORDER — OLANZAPINE 10 MG/1
10 TABLET, ORALLY DISINTEGRATING ORAL
Status: DISCONTINUED | OUTPATIENT
Start: 2022-04-19 | End: 2022-05-25 | Stop reason: HOSPADM

## 2022-04-19 RX ORDER — CHLORPROMAZINE HYDROCHLORIDE 25 MG/ML
50 INJECTION INTRAMUSCULAR EVERY 6 HOURS PRN
Status: DISCONTINUED | OUTPATIENT
Start: 2022-04-19 | End: 2022-05-25 | Stop reason: HOSPADM

## 2022-04-19 RX ORDER — OLANZAPINE 5 MG/1
5 TABLET, ORALLY DISINTEGRATING ORAL
Status: DISCONTINUED | OUTPATIENT
Start: 2022-04-19 | End: 2022-05-25 | Stop reason: HOSPADM

## 2022-04-19 RX ORDER — CHLORPROMAZINE HYDROCHLORIDE 25 MG/1
75 TABLET, FILM COATED ORAL EVERY 6 HOURS PRN
Status: DISCONTINUED | OUTPATIENT
Start: 2022-04-19 | End: 2022-05-25 | Stop reason: HOSPADM

## 2022-04-19 RX ORDER — RISPERIDONE 2 MG/1
2 TABLET, ORALLY DISINTEGRATING ORAL 2 TIMES DAILY
Status: DISCONTINUED | OUTPATIENT
Start: 2022-04-19 | End: 2022-04-20

## 2022-04-19 RX ORDER — OLANZAPINE 10 MG/1
10 INJECTION, POWDER, LYOPHILIZED, FOR SOLUTION INTRAMUSCULAR EVERY 2 HOUR PRN
Status: DISCONTINUED | OUTPATIENT
Start: 2022-04-19 | End: 2022-05-25 | Stop reason: HOSPADM

## 2022-04-19 RX ADMIN — RISPERIDONE 2 MG: 2 TABLET, ORALLY DISINTEGRATING ORAL at 18:18

## 2022-04-19 RX ADMIN — LORAZEPAM 2 MG: 2 INJECTION INTRAMUSCULAR; INTRAVENOUS at 16:18

## 2022-04-19 RX ADMIN — LITHIUM CARBONATE 300 MG: 300 TABLET, EXTENDED RELEASE ORAL at 21:57

## 2022-04-19 RX ADMIN — OLANZAPINE 5 MG: 5 TABLET, FILM COATED ORAL at 09:38

## 2022-04-19 RX ADMIN — CLONAZEPAM 0.5 MG: 0.5 TABLET ORAL at 18:18

## 2022-04-19 RX ADMIN — HALOPERIDOL LACTATE 5 MG: 5 INJECTION, SOLUTION INTRAMUSCULAR at 04:18

## 2022-04-19 RX ADMIN — LORAZEPAM 2 MG: 2 INJECTION INTRAMUSCULAR; INTRAVENOUS at 04:19

## 2022-04-19 RX ADMIN — LITHIUM CARBONATE 300 MG: 300 TABLET, EXTENDED RELEASE ORAL at 09:38

## 2022-04-19 RX ADMIN — OLANZAPINE 10 MG: 10 TABLET, FILM COATED ORAL at 21:57

## 2022-04-19 RX ADMIN — BENZTROPINE MESYLATE 1 MG: 1 INJECTION INTRAMUSCULAR; INTRAVENOUS at 04:18

## 2022-04-19 RX ADMIN — OLANZAPINE 10 MG: 10 TABLET, ORALLY DISINTEGRATING ORAL at 15:13

## 2022-04-19 RX ADMIN — CLONAZEPAM 0.5 MG: 0.5 TABLET ORAL at 09:38

## 2022-04-19 NOTE — NURSING NOTE
Patient continues to not follow directions  He ate his lunch but remains restless  He appears tired and was encouraged to lay down  Patient lays down for a few minutes and then gets up and yells at one to one to let him out  Continues to be disorganized  Continue to monitor

## 2022-04-19 NOTE — H&P
Ro Caldera  RLT:[birth year] Nichol Hancock  BKD:[id number]    [facility]:[id number]  Adm Date: 4/18/2022 2232  10:32 PM   ATT PHY: César Blackwell, [address]         Chief Complaint:  Aggressive behavior    History of Presenting Illness: Rae Post is a(n) 31y o  year old male who was admitted through emergency department due to aggressive behavior  Patient examined at bedside  Patient denied any active suicidal homicidal ideations      No Known Allergies    Current Facility-Administered Medications on File Prior to Encounter   Medication Dose Route Frequency Provider Last Rate Last Admin    [DISCONTINUED] acetaminophen (TYLENOL) tablet 650 mg  650 mg Oral Q4H PRN Celeste Castañeda, DO   650 mg at 04/18/22 1244    [DISCONTINUED] clonazePAM (KlonoPIN) tablet 0 5 mg  0 5 mg Oral BID Celeste Castañeda, DO   0 5 mg at 04/18/22 1807    [DISCONTINUED] lithium carbonate (LITHOBID) CR tablet 300 mg  300 mg Oral Q12H [address] Celeste Castañeda, DO   300 mg at 04/18/22 2009    [DISCONTINUED] OLANZapine (ZyPREXA) tablet 10 mg  10 mg Oral HS Celeste Pereaky, DO   10 mg at 04/18/22 2135    [DISCONTINUED] OLANZapine (ZyPREXA) tablet 5 mg  5 mg Oral Daily Celeste Castañeda, DO         Current Outpatient Medications on File Prior to Encounter   Medication Sig Dispense Refill    clonazePAM (KlonoPIN) 0 5 mg tablet Take 0 5 mg by mouth 2 (two) times a day      lithium 300 MG tablet Take 600 mg by mouth 2 (two) times a day      OLANZapine (ZyPREXA) 15 mg tablet Take 15 mg by mouth daily at bedtime      OLANZapine (ZyPREXA) 5 mg tablet Take 5 mg by mouth daily         Active Ambulatory Problems     Diagnosis Date Noted    No Active Ambulatory Problems     Resolved Ambulatory Problems     Diagnosis Date Noted    No Resolved Ambulatory Problems     Past Medical History:   Diagnosis Date    Bipolar 1 disorder ([facility] [address] )     Psychiatric disorder     [facility]     Tobacco abuse History reviewed  No pertinent surgical history  Social History:   Social History     Socioeconomic History    Marital status: Single     Spouse name: None    Number of children: None    Years of education: None    Highest education level: None   Occupational History    None   Tobacco Use    Smoking status: Former Smoker     Packs/day: 0 20     Types: Cigarettes    Smokeless tobacco: Never Used   Vaping Use    Vaping Use: Never used   Substance and Sexual Activity    Alcohol use: Never    Drug use: Not Currently     Types: Marijuana    Sexual activity: Not Currently     Partners: Female   Other Topics Concern    None   Social History Narrative    None     Social Determinants of Health     Financial Resource Strain: Not on file   Food Insecurity: Not on file   Transportation Needs: Not on file   Physical Activity: Not on file   Stress: Not on file   Social Connections: Not on file   Intimate Partner Violence: Not on file   Housing Stability: Not on file       Family History:   Family History   Problem Relation Age of Onset    No Known Problems Mother     No Known Problems Father     No Known Problems Sister     No Known Problems Brother     No Known Problems Maternal Grandmother     No Known Problems Maternal Grandfather     No Known Problems Paternal Grandmother     No Known Problems Paternal Grandfather     No Known Problems Maternal Aunt     No Known Problems Maternal Uncle     No Known Problems Paternal Aunt     No Known Problems Paternal Uncle     No Known Problems Cousin        Review of Systems   Musculoskeletal: Positive for arthralgias  Neurological: Positive for headaches  All other systems reviewed and are negative  Physical Exam   Vitals: Blood pressure 124/73, pulse 69, temperature 97 7 °F (36 5 °C), temperature source Tympanic, resp  rate 16, height 5' 6" (1 676 m), weight 72 1 kg (159 lb), SpO2 99 %  ,Body mass index is 25 66 kg/m²    Constitutional: Awake and Alert  Well-developed and well-nourished  No distress  HENT: PERR,EOMI, conjunctiva normal  Head: Normocephalic and atraumatic  Mouth/Throat: Oropharynx is clear and moist     Eyes: Conjunctivae and EOM are normal  Pupils are equal, round, and reactive to light  Right and left eye exhibits no discharge  Neck: Neck supple  No tracheal deviation present  No thyromegaly present  Cardiovascular: Normal rate, regular rhythm and normal heart sounds  Exam reveals no friction rub  No murmur heard  Pulmonary/Chest: Effort normal and breath sounds normal  No respiratory distress  She has no wheezes  Abdominal: Soft  Bowel sounds are normal  She exhibits no distension  There is no tenderness  There is no rebound and no guarding  Neurological: Cranial Nerves grossly intact  No sensory deficit  Coordination normal    Musculoskeletal:   Nontender spine  Skin: Skin is warm and dry  No rash noted  No diaphoresis  No erythema  No edema  No cyanosis  Assessment     Carisa Mazariegos is a(n) 31y o  year old male with Schizoaffective Disorder Bipolar type    1  Tobacco abuse  Patient has been put on nicotine transdermal patch 7 mg daily  2  Arthritis/headache  Patient may get Tylenol on as needed basis  3  Insomnia  Patient may get trazodone p r n  4  Psych with schizoaffective disorder bipolar type  Patient is being managed by psych  Prognosis: Fair  Discharge Plan: In progress  Advanced Directives: I have discussed in detail the patient the advanced directives  Patient has not appointed anybody as his POA and has no living will with advanced healthcare directives  Patient's 1st contact is his mother Amairani Johansen and her phone number is [phone]  When discussing cardiac and pulmonary resuscitation efforts with the patient, the patient wishes to be FULL CODE      I have spent more than 50 minutes gathering data, doing physical examination, and discussing the advanced directives, which was witnessed by caring staff

## 2022-04-19 NOTE — NURSING NOTE
Patient required an Injection, he was given Haldol, ativan and cogentin without difficulty  Patient continued to be difficulty to redirect, disruptive on the unit by yelling in the halls at random times, antagonizing his peers, disorganized, internally preoccupied and labile

## 2022-04-19 NOTE — NURSING NOTE
Patient observered without sleep through the night   Difficulty following directions  disorgainzed [employer], internally preoccupied, restless,labile and argumentative, randomly yelling and difficult to redirect

## 2022-04-19 NOTE — PROGRESS NOTES
Pt is ordered AM labs   Per pt's nurse, hold off on obtaining labs at this time to promote rest due to pt being up the majority of the night

## 2022-04-19 NOTE — NURSING NOTE
Continues to present with lability;hits window,threatens staff through the quiet room,yet has not attempted to physically harm staff when entering room,providing care,giving prns,nourishment and nutrition  He does not respond appropriately when asked about psychological well being,he is able to make needs known and was able to complete his dinner menu without an issue  In addition to documented education we discussed possible ways to calm self via meditation and guided imagery;he did not respond yet did lay down briefly  Will continue to educate,monitor,and provide safe,therapeutic milieu  Continue with seclusion and 1:1 continuous observation

## 2022-04-19 NOTE — NURSING NOTE
Presents with  Agitated behaviors  pressured speech with demands,threatening violence,delusional statements of paranoia  Offered and accepted zyprexa po 10 mg with education provided on expected therapeutics and SE to report

## 2022-04-19 NOTE — ED NOTES
Pt refused to sign EMTALA stating "I don't agree to go there, I don't want to go"     Vin Jiménez, RN  04/18/22 2014

## 2022-04-19 NOTE — PROGRESS NOTES
04/19/22 8138   Activity/Group Checklist   Group   ([employer])   Attendance Did not attend  (AT group offered, PT elected to remain in room)

## 2022-04-19 NOTE — RESTRAINT FACE TO FACE
Restraint Face to Face   Juany De Anda 31 y o  male MRN: [id number]  Unit/Bed#: [facility] 224-01 Encounter: [id number]      Physical Evaluation: stable  Purpose for Restraints/ Seclusion: highly  Agitated and delusional  Unpredictable behaviors  Patient's reaction to the intervention: patient agitated and delusional  Patient illogical and unable to maintain safety    Patient's medical condition: stable  Patient's Behavioral condition:delusional and psychotic-Patient illogical and nonsensical   Restraints initiated

## 2022-04-19 NOTE — NURSING NOTE
Patient with unpredictable behaviors  Unable to redirect  Yelling in halls disrupting patients  Provoking peers  Labile  Threatening others  Cursing at peers  Harmful to self and others  Ilogical and unable to maintain safety  Locked seclusion initiated  1:1 observer present  Continue to monitor

## 2022-04-19 NOTE — PLAN OF CARE
Problem: Risk for Self Injury/Neglect  Goal: Treatment Goal: Remain safe during length of stay, learn and adopt new coping skills, and be free of self-injurious ideation, impulses and acts at the time of discharge  Outcome: Progressing  Goal: Verbalize thoughts and feelings  Description: Interventions:  - Assess and re-assess patient's lethality and potential for self-injury  - Engage patient in 1:1 interactions, daily, for a minimum of 15 minutes  - Encourage patient to express feelings, fears, frustrations, hopes  - Establish rapport/trust with patient   Outcome: Progressing  Goal: Attend and participate in unit activities, including therapeutic, recreational, and educational groups  Description: Interventions:  - Provide therapeutic and educational activities daily, encourage attendance and participation, and document same in the medical record  - Obtain collateral information, encourage visitation and family involvement in care   Outcome: Progressing  Goal: Recognize maladaptive responses and adopt new coping mechanisms  Outcome: Progressing  Goal: Complete daily ADLs, including personal hygiene independently, as able  Description: Interventions:  - Observe, teach, and assist patient with ADLS  - Monitor and promote a balance of rest/activity, with adequate nutrition and elimination  Outcome: Progressing
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
## 2022-04-19 NOTE — NURSING NOTE
Daily Hanna was given IM PRN medication for a patel score of 42  Pt was physically threatening harm toward staff  Pt was yelling and hitting window  Pt was unable to follow directions  Will continue to monitor for effectiveness

## 2022-04-19 NOTE — NURSING NOTE
Patient observed Pacing the halls, walking in and out of his room oftent and yelling randomly amongst himself in the halls  Upon approach, patient expressed being frustrated bit happy to be at a place where people are nice to him  Patient request medication for sleep  And to help his agitation and anxiety  Po Ativan, haldol and cogentin administered

## 2022-04-19 NOTE — NURSING NOTE
Patient observed sleeping at start of shift  Breakfast held and he did wake up mid morning to eat  He took morning medications as ordered by mouth  Patient disorganized with thoughts and speech  Patient came out of his room and was randomly yelling out in the halls  He was provoking peers  Labile  Appears internally preoccupied  Staff redirected patient back to his room for less stimulation  He is currently sleeping  Continue to monitor

## 2022-04-19 NOTE — PROGRESS NOTES
04/19/22 1000   Activity/Group Checklist   Group   ([employer] )   Attendance Attended   Attendance Duration (min) 16-30   Interactions Disorganized interaction   Affect/Mood Wide;MONICA

## 2022-04-20 PROCEDURE — 99232 SBSQ HOSP IP/OBS MODERATE 35: CPT | Performed by: HOSPITALIST

## 2022-04-20 RX ADMIN — TRAZODONE HYDROCHLORIDE 100 MG: 50 TABLET ORAL at 20:53

## 2022-04-20 RX ADMIN — RISPERIDONE 3 MG: 2 TABLET, ORALLY DISINTEGRATING ORAL at 17:04

## 2022-04-20 RX ADMIN — BENZTROPINE MESYLATE 0.5 MG: 1 INJECTION INTRAMUSCULAR; INTRAVENOUS at 03:51

## 2022-04-20 RX ADMIN — ACETAMINOPHEN 975 MG: 325 TABLET ORAL at 10:57

## 2022-04-20 RX ADMIN — LITHIUM CARBONATE 300 MG: 300 TABLET, EXTENDED RELEASE ORAL at 20:48

## 2022-04-20 RX ADMIN — LITHIUM CARBONATE 300 MG: 300 TABLET, EXTENDED RELEASE ORAL at 08:07

## 2022-04-20 RX ADMIN — CLONAZEPAM 0.5 MG: 0.5 TABLET ORAL at 17:04

## 2022-04-20 RX ADMIN — CLONAZEPAM 0.5 MG: 0.5 TABLET ORAL at 08:06

## 2022-04-20 RX ADMIN — LORAZEPAM 1 MG: 2 INJECTION INTRAMUSCULAR; INTRAVENOUS at 03:51

## 2022-04-20 RX ADMIN — RISPERIDONE 2 MG: 2 TABLET, ORALLY DISINTEGRATING ORAL at 08:07

## 2022-04-20 RX ADMIN — NICOTINE 7 MG/24 HR DAILY TRANSDERMAL PATCH 1 PATCH: at 08:07

## 2022-04-20 RX ADMIN — HYDROXYZINE HYDROCHLORIDE 50 MG: 50 TABLET, FILM COATED ORAL at 10:57

## 2022-04-20 RX ADMIN — HALOPERIDOL LACTATE 2.5 MG: 5 INJECTION, SOLUTION INTRAMUSCULAR at 03:51

## 2022-04-20 RX ADMIN — OLANZAPINE 10 MG: 10 TABLET, FILM COATED ORAL at 20:48

## 2022-04-20 NOTE — NURSING NOTE
Informed patient was sleeping and had awaken,unsteady gait/drowsy  Offered hydration and nutrition of which he accepted H2O  He denied having to use bathroom:advised to lay down as to not fall and injure himself  He heeded advise,was given an extra cover for comfort  Resting quietly @ this time  Remains in locked seclusion and 1;1 continuous observation

## 2022-04-20 NOTE — PROGRESS NOTES
Pt had the following items dropped off for them:      Remains with pt:    None at this time      Pt storage:    Shoes x1 pair    Socks x1 pair    Pants x1    Sweater x1    T-shirt x1        Contraband drawer:    Lighter x1    Cigarettes x16    $5 00 cash

## 2022-04-20 NOTE — NURSING NOTE
Pt given IM PRN for Brasher score pf 39  Pt in quiet room internally preoccupied, yelling, pacing, banging on window     Will continue to monitor

## 2022-04-20 NOTE — PROGRESS NOTES
04/19/22 0909   Team Meeting   Meeting Type Daily Rounds   Team Members Present   Team Members Present Physician;Nurse;; Other (Discipline and Name)   Physician Team Member Shane garsia, [facility] Team Member Merced Reeves   Social Work Team Member [place] [address]   Patient/Family Present   Patient Present No   Patient's Family Present No     New admission  302  Increased psychotic episodes , increased agitation, labile, decreased sleep

## 2022-04-20 NOTE — NURSING NOTE
Isiah Salmon was observed confused and unable to follow directions  Pt was irritable and disorganized  Pt remains a danger to himself and and poses a risk to others  Pt is bizarre  Pt remains in locked seclusion  Elimination, flood, and drink offered  Will continue to monitor with 1:1 supervision

## 2022-04-20 NOTE — NURSING NOTE
Epifania Sacks was observed restless and irritable  Pt remains unable to follow directions and is a danger to himself and others  Will continue with locked seclusion
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
## 2022-04-20 NOTE — PROGRESS NOTES
Progress Note - Behavioral Health     Carisa Mazariegos 31 y o  male MRN: [id number]   Unit/Bed#: [facility] 224-01 Encounter: [id number]    Behavior over the last 24 hours: unchanged  Nicki Waterman seen today, per staff report has been locked seclusion due to severe symptoms on the unit  Patient seen in seclusion room and continues to remain grossly psychotic  He is disconnected to reality, paranoid and bizarre in his content of thought  Admits he has not slept for 3 days, states he is fearful to go to sleep of what would happen  Makes paranoid comments about world events related to him  Patient has been aggressive and disruptive on the unit requiring being in locked seclusion  Mental Status Evaluation:    Appearance:  disheveled, marginal hygiene, wearing hospital clothes   Behavior:  agitated   Speech:  pressured, loud   Mood:  anxious, labile   Affect:  labile, increased in intensity   Thought Process:  disorganized, illogical   Associations: tangential associations   Thought Content:  paranoid and bizarre delusions   Perceptual Disturbances: denies auditory hallucinations when asked, appears responding to internal stimuli, talks to self at times   Risk Potential: Suicidal ideation - None at present  Homicidal ideation - None at present   Sensorium:  oriented to person and place   Memory:  patient does not answer   Consciousness:  alert and awake   Attention: poor concentration   Insight:  impaired   Judgment: impaired   Gait/Station: normal gait/station   Motor Activity: abnormal movement noted: abnormal posture present     Vital signs in last 24 hours:    Temp:  [97 6 °F (36 4 °C)-98 °F (36 7 °C)] 97 6 °F (36 4 °C)  HR:  [] 120  Resp:  [16-18] 18  BP: (126-127)/(81-85) 126/81    Laboratory results: I have personally reviewed all pertinent laboratory/tests results          Assessment/Plan   Principal Problem:    [facility]ffective [facility] ([facility]    Recommended Treatment:     Planned medication and treatment changes:  Increase Risperdal to 3 mg daily   continue Zyprexa 10 mg at bedtime   continue lithium 300 mg every 12 hours  Continue Klonopin 0 5 mg b i d    All current active medications have been reviewed  Encourage group therapy, milieu therapy and occupational therapy  Behavioral Health checks every 7 minutes  Current Facility-Administered Medications   Medication Dose Route Frequency Provider Last Rate    acetaminophen  650 mg Oral Q6H PRN Jonatan Orozco Medei, CRNP      acetaminophen  650 mg Oral Q4H PRN Jonatan Barraganei, CRNP      acetaminophen  975 mg Oral Q6H PRN Jonatan Orozco Medei, CRNP      aluminum-magnesium hydroxide-simethicone  30 mL Oral Q4H PRN Jonatan Orozco Medei, CRNP      haloperidol lactate  2 5 mg Intramuscular Q6H PRN Max 4/day Jonatan Orozco Medei, CRNP      And    LORazepam  1 mg Intramuscular Q6H PRN Max 4/day Jonatan Orozco Medei, CRNP      And    benztropine  0 5 mg Intramuscular Q6H PRN Max 4/day Jonatan Orozco Medei, CRNP      LORazepam  2 mg Intramuscular Q4H PRN Max 4/day Jonatan Orozco Medei, CRNP      And    benztropine  1 mg Intramuscular Q4H PRN Max 4/day Jonatan Barraganei, CRNP      benztropine  1 mg Oral Q6H PRN Jonatan Ramirez, CRNP      chlorproMAZINE  50 mg Intramuscular Q6H PRN Rufina Hugo MD      chlorproMAZINE  75 mg Oral Q6H PRN Rufina Hugo MD      clonazePAM  0 5 mg Oral BID Jonatan Orozco Medei, CRNP      hydrOXYzine HCL  25 mg Oral Q6H PRN Max 4/day Jonatan Barraganei, CRNP      hydrOXYzine HCL  50 mg Oral Q4H PRN Max 4/day Jonatan Orozco Medei, CRNP      Or    LORazepam  1 mg Intramuscular Q4H PRN Jonatan Orozco Medei, CRNP      lithium carbonate  300 mg Oral Q12H [facility] Jonatan Barraganei, CRNP      LORazepam  1 mg Oral Q6H PRN Jonatan Orozco Medei, CRNP      Or    LORazepam  2 mg Intramuscular Q6H PRN Max 3/day Jonatan Orozco Medei, CRNP      nicotine  1 patch Transdermal Daily Jonatan Ramirez, CRNP      OLANZapine  10 mg Oral Q3H PRN Rufina Hugo MD      OLANZapine  5 mg Oral Q3H PRN Charity Woo MD      OLANZapine  10 mg Intramuscular Q2H PRN Charity Woo MD      OLANZapine  10 mg Oral HS Tori BRIONNA Nettles      polyethylene glycol  17 g Oral Daily PRN Tori BRIONNA Nettles      risperiDONE  2 mg Oral BID Charity Woo MD      traZODone  100 mg Oral HS PRN ToriBRIONNA Palacio         Risks / Benefits of Treatment:    Risks, benefits, and possible side effects of medications explained to patient  Patient has limited understanding of risks and benefits of treatment at this time, but agrees to take medications as prescribed  Counseling / Coordination of Care: Total floor / unit time spent today 25 minutes  Greater than 50% of total time was spent with the patient and / or family counseling and / or coordination of care  A description of counseling / coordination of care:  Patient's progress discussed with staff in treatment team meeting  Medications, treatment progress and treatment plan reviewed with patient      Bianca Nam MD 04/20/22

## 2022-04-20 NOTE — NURSING NOTE
Lenard was compliant with evening medication and ate some of dinner with frequent redirection  Pt often was yelling at staff and confused  Pt is sometimes verbally aggressive and punches glass  Pt is paranoid and delusional  Pt cannot function within milieu without putting himself at risk of danger  Pt is unable to hold conversation or comprehend commands  Will continue with locked seclusion and 1:1 supervision

## 2022-04-20 NOTE — PROGRESS NOTES
Progress Note - Cricket Li 31 y o  male MRN: [id number]    Unit/Bed#: [facility] 224-01 Encounter: [id number]        Subjective:   Patient seen and examined at bedside after reviewing the chart and discussing the case with the caring staff  Patient examined at bedside  Patient was reported to be agitated and aggressive and has been put on seclusion room  Patient's vitamin-D and B12 levels are still pending  Physical Exam   Vitals: Blood pressure 126/81, pulse (!) 120, temperature 97 6 °F (36 4 °C), temperature source Temporal, resp  rate 18, height 5' 6" (1 676 m), weight 72 1 kg (159 lb), SpO2 99 %  ,Body mass index is 25 66 kg/m²  Constitutional: He appears well-developed  HEENT: PERR, EOMI, MMM  Cardiovascular: Normal rate and regular rhythm  Pulmonary/Chest: Effort normal and breath sounds normal    Abdomen: Soft, + BS, NT    Assessment/Plan:  Cricket Li is a(n) 31y o  year old male with Schizoaffective Disorder Bipolar type     1  Tobacco abuse  Patient has been put on nicotine transdermal patch 7 mg daily  2  Arthritis/headache  Patient may get Tylenol on as needed basis  3  Insomnia  Patient may get trazodone p r n  Michelle Serum

## 2022-04-20 NOTE — H&P
Psychiatric Evaluation - Behavioral Health     Identification Data:Varsha Gallegos 31 y o  male MRN: [id number]  Unit/Bed#: [facility] 224-01 Encounter: [id number]    Chief Complaint: violent behavior, unstable mood, acute psychosis, paranoid ideation, delusional thoughts, aggressive behavior and agitation    History of Present Illness     Derik Toth is a 31 y o  male with a history of schizophrenia versus schizoaffective disorder who was admitted to the inpatient psychiatric unit on a involuntary 302 commitment basis due to unstable mood, signs of acute psychosis, delusional thoughts and paranoid ideation  Symptoms prior to admission included worsening depression, mood swings, decreased need for sleep, bizarre behavior, anger outbursts, difficulty controlling anger, agitation, aggressive behavior, delusional thoughts and delusional thinking with persecutory delusions  Onset of symptoms was abrupt starting several days ago with progressively worsening course since that time  Stressors preceding admission included COVID-19 issues, chronic mental illness, difficulty with anger management and noncompliance with treatment  On initial evaluation after admission to the inpatient psychiatric unit the patient  Angry, agitated, screaming obscenities and threatening statement to staff  The 1st time when the writer tried to evaluate the patient, Mr Avinash Gallegos  Was not able to converse with the writer, was preoccupied by the writer's accent and country of origin  After the patient received the p r n  medications to help him to reduce his agitated paranoid state, the patient admitted that he had previous psychiatric admissions including recent treatment at [facility], that he believes that people are against him, he admitted history of auditory hallucinations, he stated he did not have any pain, and he had normal appetite    After such brief communication the writer could not get more information because the patient started screaming at supporting staff threatening them and we had to stop our interview  Available record indicated that the patient had previous treatment at [facility] been in similar psychotic agitated aggressive state, and that risperidone help the patient to regain control over his agitated psychosis  Psychiatric Review Of Systems:     The unobtainable due to patient factors      Historical Information     Past Psychiatric History:     Past Inpatient Psychiatric Treatment:   Several past inpatient psychiatric admissions  Past Outpatient Psychiatric Treatment:    Was in outpatient psychiatric treatment in the past with a psychiatrist  Past Suicide Attempts:    no  Past Psychiatric Medication Trials:    patient does not remember and multiple psychiatric medication trials     Substance Abuse History:  Social History     Tobacco History     Smoking Status  Former Smoker Smoking Frequency  0 2 packs/day Smoking Tobacco Type  Cigarettes    Smokeless Tobacco Use  Never Used          Alcohol History     Alcohol Use Status  Never          Drug Use     Drug Use Status  Not Currently Types  Marijuana          Sexual Activity     Sexually Active  Not Currently Partners  Female          Activities of Daily Living    Not Asked               Additional Substance Use Detail     Questions Responses    Cannabis frequency Daily    Cannabis method Smoke    Cannabis 1st Use unknown    Cannabis Longest Abstinence unknown    Last reviewed by Toi Edmond MD on 4/19/2022        I am unable to assess the patient for substance use within the past 12 months as they are unable or unwilling to answer      Family Psychiatric History:  unobtainable    Social History:      Marital History: single    Traumatic History:     Abuse: not willing to provide details    Past Medical History:    History of Seizures: no    Past Medical History:   Diagnosis Date    B[facility]     Psychiatric disorder     Psychosis ([facility] )     Tobacco abuse      History reviewed  No pertinent surgical history  Medical Review Of Systems:    EFO Review Of Systems: Review of systems not obtained due to patient factors  Allergies:    No Known Allergies    Medications: All current active medications have been reviewed  Objective     Vital signs in last 24 hours:    Temp:  [97 7 °F (36 5 °C)-98 °F (36 7 °C)] 98 °F (36 7 °C)  HR:  [69-92] 92  Resp:  [16] 16  BP: (124-127)/(73-85) 127/85    No intake or output data in the 24 hours ending 04/19/22 2150     Mental Status Evaluation:      Appearance:  dressed in hospital attire   Behavior:  agitated, angry, demanding, psychomotor agitation, inappropriate   Mood:  dysphoric   Affect: labile    Speech:  pressured, profane   Language: repeating phrases   Thought Process:  disorganized, flight of ideas, illogical, loose associations and perserverative   Associations: loose associations, flight of ideas   Thought Content:  persecutory delusions   Perceptual Disturbances: auditory hallucinations   Risk Potential: Suicidal ideation - unable to assess  Homicidal ideation - angry, hostile feelings  Potential for aggression - Yes, due to acute psychosis   Sensorium:  unable to assess   Memory:  recent and remote memory: unable to assess due to lack of cooperation   Consciousness:  alert and awake   Attention: decreased concentration   Fund of Knowledge: past history: unable to assess due to lack of cooperation   Insight:  poor   Judgment: poor   Muscle Tone: normal   Gait/Station: normal balance   Motor Activity: no abnormal movements               Laboratory Results:   I have personally reviewed all pertinent laboratory/tests results    Most Recent Labs:   Lab Results   Component Value Date    WBC 10 11 11/28/2021    RBC 4 75 11/28/2021    HGB 14 8 11/28/2021    HCT 43 2 11/28/2021     11/28/2021    RDW 11 6 11/28/2021    NEUTROABS 8 30 (H) 11/28/2021    SODIUM 142 11/28/2021    K 4 1 11/28/2021     11/28/2021    CO2 26 11/28/2021    BUN 11 11/28/2021    CREATININE 1 14 11/28/2021    GLUC 99 11/28/2021    CALCIUM 8 7 11/28/2021    AST 26 11/28/2021    ALT 20 11/28/2021    ALKPHOS 67 11/28/2021    TP 6 4 11/28/2021    ALB 4 1 11/28/2021    TBILI 0 43 11/28/2021    VALPROICTOT 6 (L) 11/28/2021    LITHIUM 0 9 07/29/2019    HGBA1C 5 2 12/17/2020     12/17/2020       Imaging Studies: No results found  Code Status: Level 1 - Full Code    Assessment/Plan   Principal Problem:    Schizoaffective disorder, bipolar type ([facility] )      Treatment Plan:     Planned Treatment and Medication Changes: All current active medications have been reviewed  Encourage group therapy, milieu therapy and occupational therapy  Behavioral Health checks every 7 minutes  Restart  Risperidone as medication that was helpful before  Because of acute agitation it is expected that the patient may receive p r n  medications which may help the patient to reduce agitation  Will also provide Zyprexa that was started already at night  The initial treatment of patient's psychosis with agitation required anti psychotic medications  Will cross taper Zyprexa was risperidone and consider to provide [address] shot to help patient with adherence to medication management on outpatient basis  Will continue lithium that was started on admission to treat aggressive mood disorder    Current Facility-Administered Medications   Medication Dose Route Frequency Provider Last Rate    acetaminophen  650 mg Oral Q6H PRN [place] Medei, CRNP      acetaminophen  650 mg Oral Q4H PRN [place] Medei, CRNP      acetaminophen  975 mg Oral Q6H PRN [place] Medei, CRNP      aluminum-magnesium hydroxide-simethicone  30 mL Oral Q4H PRN [place] Medei, CRNP      haloperidol lactate  2 5 mg Intramuscular Q6H PRN Max 4/day [place] Medei, CRNP      And    LORazepam  1 mg Intramuscular Q6H PRN Max 4/day [place] Medei, CRNP      And    benztropine  0 5 mg Intramuscular Q6H PRN Max 4/day Mitch Coins Medei, CRNP      LORazepam  2 mg Intramuscular Q4H PRN Max 4/day Mitch Co[place] Medei, CRNP      And    benztropine  1 mg Intramuscular Q4H PRN Max 4/day Mitch Coins Medei, CRNP      benztropine  1 mg Oral Q6H PRN Mitch Coins Medei, CRNP      chlorproMAZINE  50 mg Intramuscular Q6H PRN Mary Real MD      chlorproMAZINE  75 mg Oral Q6H PRN Mary Real MD      clonazePAM  0 5 mg Oral BID Mitch Co[place] Medei, CRNP      hydrOXYzine HCL  25 mg Oral Q6H PRN Max 4/day Mitch Coins Medei, CRNP      hydrOXYzine HCL  50 mg Oral Q4H PRN Max 4/day Mitch Coins Medei, CRNP      Or    LORazepam  1 mg Intramuscular Q4H PRN Mitch Co[place] Medei, CRNP      lithium carbonate  300 mg Oral Q12H [facility] Mitch Coins Medei, CRNP      LORazepam  1 mg Oral Q6H PRN Mitch Coins Medei, CRNP      Or    LORazepam  2 mg Intramuscular Q6H PRN Max 3/day Mitch Coins Medei, CRNP      nicotine  1 patch Transdermal Daily Mitch Coins Medei, CRNP      OLANZapine  10 mg Oral Q3H PRN Mary Real MD      OLANZapine  5 mg Oral Q3H PRN Mary Real MD      OLANZapine  10 mg Intramuscular Q2H PRN Mary Real MD      OLANZapine  10 mg Oral HS Mitch Coins Medei, CRNP      polyethylene glycol  17 g Oral Daily PRN Mitch Coins Medei, CRNP      risperiDONE  2 mg Oral BID Mary Real MD      traZODone  100 mg Oral HS PRN Mitch Coins Medei, CRNP         Risks / Benefits of Treatment:    Risks, benefits, and possible side effects of medications explained to patient  Patient does not verbalize understanding of benefits or risks of treatment refusal at this time and needs ongoing explanation of treatment benefits and treatment plan  Counseling / Coordination of Care:    Patient's presentation on admission and proposed treatment plan discussed with treatment team   Diagnosis, medication changes and treatment plan reviewed with patient      Inpatient Psychiatric Certification:    Estimated length of stay: 14 midnights    Based upon physical, mental and social evaluations, I certify that inpatient psychiatric services are medically necessary for this patient for a duration of 14 midnights for the treatment of Schizoaffective disorder, bipolar type [facility])    Available alternative community resources do not meet the patient's mental health care needs  I further attest that an established written individualized plan of care has been implemented and is outlined in the patient's medical records  ** Please Note: This note has been constructed using a voice recognition system   **

## 2022-04-20 NOTE — NURSING NOTE
Janeann Line was observed pacing in locked seclusion  Pt was assisted with elimination multiple times throughout the shift  Pt was given food and drink multiple times throughout the shift  Pt is bizarre, paranoid, delusional, and aggressive  Pt yells and screams  Pt is extremely bizarre and unable to follow directions  Pt is unable have conversation  Pt remains a danger to himself and others  Pt will continue with locked seclusion

## 2022-04-20 NOTE — TREATMENT PLAN
TREATMENT PLAN REVIEW - [facility] Elvis Mccord 26 y o  1995 male MRN: [id number]    [address] [facility] [address] Room / Bed: [place] [address]/[facility] 106-79 Encounter: [id number]          Admit Date/Time:  4/18/2022 10:32 PM    Treatment Team: Attending Provider: Tuyet Marks MD; Consulting Physician: Segundo Contreras MD; : Ge Patterson; Patient Care Assistant: Ban Jackson; Registered Nurse: Fausto Montes RN; Patient Care Assistant: Ronald Cesar; Registered Nurse: Lore Rodriguez, RN; Certified Nursing Assistant: Apolinar Mccabe; Patient Care Assistant: Harrison Dozier;  Care Manager: Pamela Musa RN; Patient Care Technician: Viridiana Irizarry; Recreational Therapist: Valentina Yi    Diagnosis: Principal Problem:    Schizoaffective disorder, bipolar type [facility])    Patient Strengths: negotiates basic needs     Patient Barriers: chronic mental illness, difficulty adapting, patient is on an involuntary commitment, poor support system, uncooperative    Short Term Goals: decrease in paranoid thoughts, decrease in psychotic symptoms, decrease in level of agitation, decrease in frequency of aggressive outbursts, ability to stay safe on the unit, ability to stay free from restraints    Long Term Goals: stabilization of mood, no self abusive behavior, resolution of psychotic symptoms, improved reality testing, improved reasoning ability, improved impulse control, improved insight, no agitation on the unit, no aggressive behavior on the unit, acceptance of need for psychiatric medications, acceptance of need for psychiatric treatment, acceptance of need for psychiatric follow up after discharge, acceptance of psychiatric diagnosis, adequate self care, adequate sleep, adequate appetite, appropriate interaction with peers, appropriate interaction with family, stable living arrangements upon discharge    Progress Towards Goals: starting psychitric medications as prescribed    Recommended Treatment: medication management, patient medication education, group therapy, milieu therapy, continued Behavioral Health psychiatric evaluation/assessment process     Treatment Frequency: daily medication monitoring, group and milieu therapy daily, monitoring through interdisciplinary rounds, monitoring through weekly patient care conferences    Expected Discharge Date:  tbd    Discharge Plan: referral to [address] [place] [address] Team for close psychiatric monitoring    Treatment Plan Created/Updated By: Nirmal Luna MD

## 2022-04-20 NOTE — PROGRESS NOTES
04/20/22 1000   Activity/Group Checklist   Group   (Self Reflection Art Therapy Processing)   Attendance Did not attend  (PT in quiet room for safety due to aggressive behaviors)

## 2022-04-20 NOTE — PROGRESS NOTES
04/20/22 0635   Activity/Group Checklist   Group   (Community Meeting Group and Processing)   Attendance Did not attend  (pt in seclusion room for safety)

## 2022-04-21 PROCEDURE — 99232 SBSQ HOSP IP/OBS MODERATE 35: CPT | Performed by: PSYCHIATRY & NEUROLOGY

## 2022-04-21 RX ORDER — DIVALPROEX SODIUM 500 MG/1
1000 TABLET, DELAYED RELEASE ORAL
Status: DISCONTINUED | OUTPATIENT
Start: 2022-04-21 | End: 2022-05-25

## 2022-04-21 RX ORDER — LITHIUM CARBONATE 450 MG
450 TABLET, EXTENDED RELEASE ORAL EVERY 12 HOURS SCHEDULED
Status: DISCONTINUED | OUTPATIENT
Start: 2022-04-21 | End: 2022-04-21

## 2022-04-21 RX ADMIN — RISPERIDONE 3 MG: 2 TABLET, ORALLY DISINTEGRATING ORAL at 08:17

## 2022-04-21 RX ADMIN — BENZTROPINE MESYLATE 1 MG: 1 TABLET ORAL at 02:56

## 2022-04-21 RX ADMIN — ACETAMINOPHEN 975 MG: 325 TABLET ORAL at 14:23

## 2022-04-21 RX ADMIN — ACETAMINOPHEN 975 MG: 325 TABLET ORAL at 19:38

## 2022-04-21 RX ADMIN — NICOTINE 7 MG/24 HR DAILY TRANSDERMAL PATCH 1 PATCH: at 08:19

## 2022-04-21 RX ADMIN — LITHIUM CARBONATE 450 MG: 300 CAPSULE, GELATIN COATED ORAL at 17:04

## 2022-04-21 RX ADMIN — CLONAZEPAM 0.5 MG: 0.5 TABLET ORAL at 08:17

## 2022-04-21 RX ADMIN — LORAZEPAM 1 MG: 1 TABLET ORAL at 02:56

## 2022-04-21 RX ADMIN — CLONAZEPAM 0.5 MG: 0.5 TABLET ORAL at 17:04

## 2022-04-21 RX ADMIN — CHLORPROMAZINE HYDROCHLORIDE 75 MG: 25 TABLET, SUGAR COATED ORAL at 11:30

## 2022-04-21 RX ADMIN — HALOPERIDOL LACTATE 2.5 MG: 5 INJECTION, SOLUTION INTRAMUSCULAR at 06:27

## 2022-04-21 RX ADMIN — LORAZEPAM 1 MG: 1 TABLET ORAL at 13:10

## 2022-04-21 RX ADMIN — LITHIUM CARBONATE 300 MG: 300 TABLET, EXTENDED RELEASE ORAL at 08:17

## 2022-04-21 RX ADMIN — HYDROXYZINE HYDROCHLORIDE 50 MG: 50 TABLET, FILM COATED ORAL at 11:29

## 2022-04-21 RX ADMIN — BENZTROPINE MESYLATE 1 MG: 1 INJECTION INTRAMUSCULAR; INTRAVENOUS at 16:42

## 2022-04-21 RX ADMIN — BENZTROPINE MESYLATE 1 MG: 1 INJECTION INTRAMUSCULAR; INTRAVENOUS at 06:27

## 2022-04-21 RX ADMIN — OLANZAPINE 10 MG: 10 TABLET, FILM COATED ORAL at 20:46

## 2022-04-21 RX ADMIN — LORAZEPAM 2 MG: 2 INJECTION INTRAMUSCULAR; INTRAVENOUS at 06:26

## 2022-04-21 RX ADMIN — HALOPERIDOL LACTATE 2.5 MG: 5 INJECTION, SOLUTION INTRAMUSCULAR at 16:40

## 2022-04-21 RX ADMIN — TRAZODONE HYDROCHLORIDE 100 MG: 50 TABLET ORAL at 20:47

## 2022-04-21 RX ADMIN — DIVALPROEX SODIUM 1000 MG: 500 TABLET, DELAYED RELEASE ORAL at 20:47

## 2022-04-21 RX ADMIN — RISPERIDONE 3 MG: 2 TABLET, ORALLY DISINTEGRATING ORAL at 17:27

## 2022-04-21 NOTE — PROGRESS NOTES
04/21/22 1430   Activity/Group Checklist   Group   (Pictionary Communication)   Attendance Did not attend  (AT group offered, PT elected to remain in room)

## 2022-04-21 NOTE — PROGRESS NOTES
04/20/22 1200   Team Meeting   Meeting Type Tx Team Meeting   Initial Conference Date 04/20/22   Next Conference Date 05/20/22   Team Members Present   Team Members Present Physician;   Physician Team Member Nidhi Manzanares   Social Work Team Member Tanner   Patient/Family Present   Patient Present No   Patient's Family Present No     Treatment team attempted to meet with patient and complete his treatment plan  Patient could not participate in his treatment team meeting due to his acute psychotic state

## 2022-04-21 NOTE — NURSING NOTE
Epifania Sacks verbalized understanding to discontinue to locked seclusion  Pt was calm and cooperative  Pt was able to speak calmly and state that he would follow staff instructions and unit protocols  Pt stated that he would remain safe and not be a harm to himself or others  Will continue to monitor with 1:1 observation

## 2022-04-21 NOTE — PROGRESS NOTES
04/21/22 1000   Activity/Group Checklist   Group   (Remote Control of Your Life)   Attendance Attended   Attendance Duration (min) 16-30   Interactions Disorganized interaction   Affect/Mood Wide   Goals Achieved Identified feelings; Identified triggers; Able to listen to others; Able to engage in interactions; Able to self-disclose

## 2022-04-21 NOTE — CASE MANAGEMENT
Case Management Assessment    [facility]  [facility] 224/-61 MRN [id number]  : 1995 Date 2022       Current Admission Date: 2022  Current Admission Diagnosis:Schizoaffective disorder, bipolar type [facility])   Patient Active Problem List    Diagnosis Date Noted    Schizoaffective disorder, bipolar type ([facility] [address] ) 2022      LOS (days): 3  Geometric Mean LOS (GMLOS) (days):   Days to GMLOS:     OBJECTIVE:    Risk of Unplanned Readmission Score: 11         Current admission status: Inpatient Psych       Preferred Pharmacy:   [address] [place] [address] Place  No address on file      Primary Care Provider: No primary care provider on file  Primary Insurance: [place] [employer]  Secondary Insurance:     ASSESSMENT:  Active Health Care Proxies    There are no active Health Care Proxies on file  This writer met with patient to complete his intake  Patient continued to be psychotic and had difficulty completing his intake  Patient reports he lives with his grandmother in [facility]  He reports he worked as a  at [employer]  Patient stated he has OP services at [facility] but could not state the name of his psychiatrist  Patient is not oriented to place or situation  Patient was not coherent during  His assessment  This writer informed patient of his 303 hearing scheduled tomorrow at 0800            Readmission Root Cause  30 Day Readmission: No    Patient Information  Mental Status: Alert  Primary Caregiver: Self              Patient Information Continued  Income Source: Unemployed  Current Status[de-identified] 626 (Exp )

## 2022-04-21 NOTE — PROGRESS NOTES
Progress Note - Roz Robison 31 y o  male MRN: [id number]    Unit/Bed#: [facility] 224-01 Encounter: [id number]        Subjective:   Patient seen and examined at bedside after reviewing the chart and discussing the case with the caring staff  Patient examined at bedside  Patient has no reported acute complaints  Labs waiting to be collected  Physical Exam   Vitals: Blood pressure 135/86, pulse 87, temperature 98 5 °F (36 9 °C), temperature source Temporal, resp  rate 16, height 5' 6" (1 676 m), weight 72 1 kg (159 lb), SpO2 100 %  ,Body mass index is 25 66 kg/m²  Constitutional:  Patient appears well-developed  HEENT: PERR, EOMI, MMM  Cardiovascular: Normal rate and regular rhythm  Pulmonary/Chest: Effort normal and breath sounds normal    Abdomen: Soft, + BS, NT  Assessment/Plan:  Roz Robison is a(n) 31y o  year old male with schizoaffective disorder bipolar type      1  Tobacco abuse  Patient has been put on nicotine transdermal patch 7 mg daily  2  Arthritis/headache  Patient may get Tylenol on as needed basis  3  Insomnia  Patient may get trazodone as needed  The patient was discussed with Dr Maddy Nath and he is in agreement with the above note

## 2022-04-21 NOTE — PLAN OF CARE
Problem: Alteration in Thoughts and Perception  Goal: Treatment Goal: Gain control of psychotic behaviors/thinking, reduce/eliminate presenting symptoms and demonstrate improved reality functioning upon discharge  Outcome: Progressing  Goal: Verbalize thoughts and feelings  Description: Interventions:  - Promote a nonjudgmental and trusting relationship with the patient through active listening and therapeutic communication  - Assess patient's level of functioning, behavior and potential for risk  - Engage patient in 1 on 1 interactions  - Encourage patient to express fears, feelings, frustrations, and discuss symptoms    - Warner patient to reality, help patient recognize reality-based thinking   - Administer medications as ordered and assess for potential side effects  - Provide the patient education related to the signs and symptoms of the illness and desired effects of prescribed medications  Outcome: Progressing  Goal: Refrain from acting on delusional thinking/internal stimuli  Description: Interventions:  - Monitor patient closely, per order   - Utilize least restrictive measures   - Set reasonable limits, give positive feedback for acceptable   - Administer medications as ordered and monitor of potential side effects  Outcome: Progressing  Goal: Agree to be compliant with medication regime, as prescribed and report medication side effects  Description: Interventions:  - Offer appropriate PRN medication and supervise ingestion; conduct AIMS, as needed   Outcome: Progressing  Goal: Attend and participate in unit activities, including therapeutic, recreational, and educational groups  Description: Interventions:  -Encourage Visitation and family involvement in care  Outcome: Progressing  Goal: Recognize dysfunctional thoughts, communicate reality-based thoughts at the time of discharge  Description: Interventions:  - Provide medication and psycho-education to assist patient in compliance and developing insight into his/her illness   Outcome: Progressing  Goal: Complete daily ADLs, including personal hygiene independently, as able  Description: Interventions:  - Observe, teach, and assist patient with ADLS  - Monitor and promote a balance of rest/activity, with adequate nutrition and elimination   Outcome: Progressing     Problem: Depression  Goal: Treatment Goal: Demonstrate behavioral control of depressive symptoms, verbalize feelings of improved mood/affect, and adopt new coping skills prior to discharge  Outcome: Progressing  Goal: Verbalize thoughts and feelings  Description: Interventions:  - Assess and re-assess patient's level of risk   - Engage patient in 1:1 interactions, daily, for a minimum of 15 minutes   - Encourage patient to express feelings, fears, frustrations, hopes   Outcome: Progressing  Goal: Refrain from isolation  Description: Interventions:  - Develop a trusting relationship   - Encourage socialization   Outcome: Progressing  Goal: Refrain from self-neglect  Outcome: Progressing  Goal: Attend and participate in unit activities, including therapeutic, recreational, and educational groups  Description: Interventions:  - Provide therapeutic and educational activities daily, encourage attendance and participation, and document same in the medical record   Outcome: Progressing  Goal: Complete daily ADLs, including personal hygiene independently, as able  Description: Interventions:  - Observe, teach, and assist patient with ADLS  -  Monitor and promote a balance of rest/activity, with adequate nutrition and elimination   Outcome: Progressing     Problem: Risk for Violence/Aggression Toward Others  Goal: Treatment Goal: Refrain from acts of violence/aggression during length of stay, and demonstrate improved impulse control at the time of discharge  Outcome: Progressing  Goal: Verbalize thoughts and feelings  Description: Interventions:  - Assess and re-assess patient's level of risk, every waking shift  - Engage patient in 1:1 interactions, daily, for a minimum of 15 minutes   - Allow patient to express feelings and frustrations in a safe and non-threatening manner   - Establish rapport/trust with patient   Outcome: Progressing  Goal: Refrain from harming others  Outcome: Progressing  Goal: Refrain from destructive acts on the environment or property  Outcome: Progressing  Goal: Control angry outbursts  Description: Interventions:  - Monitor patient closely, per order  - Ensure early verbal de-escalation  - Monitor prn medication needs  - Set reasonable/therapeutic limits, outline behavioral expectations, and consequences   - Provide a non-threatening milieu, utilizing the least restrictive interventions   Outcome: Progressing  Goal: Attend and participate in unit activities, including therapeutic, recreational, and educational groups  Description: Interventions:  - Provide therapeutic and educational activities daily, encourage attendance and participation, and document same in the medical record   Outcome: Progressing  Goal: Identify appropriate positive anger management techniques  Description: Interventions:  - Offer anger management and coping skills groups   - Staff will provide positive feedback for appropriate anger control  Outcome: Progressing     Problem: DISCHARGE PLANNING  Goal: Discharge to home or other facility with appropriate resources  Description: INTERVENTIONS:  - Identify barriers to discharge w/patient and caregiver  - Arrange for needed discharge resources and transportation as appropriate  - Identify discharge learning needs (meds, wound care, etc )  - Refer to Case Management Department for coordinating discharge planning if the patient needs post-hospital services based on physician/advanced practitioner order or complex needs related to functional status, cognitive ability, or social support system  Outcome: Progressing     Problem: Ineffective Coping  Goal: Participates in unit activities  Description: Interventions:  - Provide therapeutic environment   - Provide required programming   - Redirect inappropriate behaviors   Outcome: Progressing

## 2022-04-21 NOTE — PROGRESS NOTES
04/21/22 0905   Team Meeting   Meeting Type Daily Rounds   Team Members Present   Team Members Present Physician;Nurse;   Physician Team [place]   Nursing Team Member [address] [facility] Work Team Member [place]   Other (Discipline and Name) Vilma Person [facility]   Patient/Family Present   Patient Present No   Patient's Family Present No     Patient continues to be labile, he has decreased sleep, he is on a 1:1  He will have 303 hearing tomorrow

## 2022-04-21 NOTE — NURSING NOTE
Presents with labile affect,mood,actions  Denies depression,anxiety,SI,HI,AH,VH in lieu of patient heard yelling incoherent statements,word salad  He is able to voice needs:ie; bathroom,hydration,nutrition  Is eating,voiding  and performing his other ADL's with prompting  He continues to hit the window although does stop after a few verbal redirections  He remains a threat /danger to himself,others and to remain in locked seclusion  with 1:1 continuous observant  Will continue to educate,monitor,and provide safe,therapeutic milieu

## 2022-04-21 NOTE — PROGRESS NOTES
04/20/22 0909   Team Meeting   Meeting Type Daily Rounds   Team Members Present   Team Members Present Physician;Nurse;; Other (Discipline and Name)   Physician Team Member Andres, [address] [facility] Team Member [address] [facility] [address] Work Team Member [place] [address]   Patient/Family Present   Patient Present No   Patient's Family Present No     Patient is erratic, aggressive, bizarre, multiple PRN, decreased sleep, in seclusion

## 2022-04-21 NOTE — PROGRESS NOTES
04/21/22 3518   Activity/Group Checklist   Group   ([employer])   Attendance Did not attend  (AT group offered, PT elected to remain in room)

## 2022-04-21 NOTE — NURSING NOTE
Martha Guillen was observed in his room and within the miliue  Pt is disheveled but showered this shift with staff aide  Pt is tangential, labile, and illogical  Pt is paranoid  Pt is hallucinating and responding to internal stimuli  Pt is unable to have meaningful conversation  Pt requires frequent redirection  Pt received PO and IM medications throughout the shift that were not effective  Pt had to be escorted out of day room after yelling and screaming in day room and agitating peers  Pt then was crawling on floor to room and required redirection  Pt c/o visual and auditory hallucinations of "his ghosts", pt c/o anxiety  Pt denies depression  Pt denies SI/HI  Pt has good PO intake  Will continue to monitor with 1:1 observation

## 2022-04-21 NOTE — NURSING NOTE
4741 given cogentin 1mg p o  ativan 1mg p o for agitation  Last order for restraints received at 0500

## 2022-04-22 PROCEDURE — 99232 SBSQ HOSP IP/OBS MODERATE 35: CPT | Performed by: HOSPITALIST

## 2022-04-22 RX ORDER — OLANZAPINE 5 MG/1
5 TABLET ORAL DAILY
Status: DISCONTINUED | OUTPATIENT
Start: 2022-04-22 | End: 2022-04-27

## 2022-04-22 RX ADMIN — NICOTINE 7 MG/24 HR DAILY TRANSDERMAL PATCH 1 PATCH: at 08:50

## 2022-04-22 RX ADMIN — OLANZAPINE 5 MG: 5 TABLET, FILM COATED ORAL at 08:50

## 2022-04-22 RX ADMIN — RISPERIDONE 3 MG: 2 TABLET, ORALLY DISINTEGRATING ORAL at 08:50

## 2022-04-22 RX ADMIN — CHLORPROMAZINE HYDROCHLORIDE 75 MG: 25 TABLET, SUGAR COATED ORAL at 06:36

## 2022-04-22 RX ADMIN — OLANZAPINE 5 MG: 5 TABLET, ORALLY DISINTEGRATING ORAL at 11:40

## 2022-04-22 RX ADMIN — ALUMINUM HYDROXIDE, MAGNESIUM HYDROXIDE, AND SIMETHICONE 30 ML: 200; 200; 20 SUSPENSION ORAL at 09:51

## 2022-04-22 RX ADMIN — ACETAMINOPHEN 650 MG: 325 TABLET ORAL at 07:23

## 2022-04-22 RX ADMIN — ACETAMINOPHEN 975 MG: 325 TABLET ORAL at 14:11

## 2022-04-22 RX ADMIN — RISPERIDONE 3 MG: 2 TABLET, ORALLY DISINTEGRATING ORAL at 17:19

## 2022-04-22 RX ADMIN — LORAZEPAM 1 MG: 1 TABLET ORAL at 11:13

## 2022-04-22 RX ADMIN — DIVALPROEX SODIUM 1000 MG: 500 TABLET, DELAYED RELEASE ORAL at 21:36

## 2022-04-22 RX ADMIN — CLONAZEPAM 0.5 MG: 0.5 TABLET ORAL at 17:19

## 2022-04-22 RX ADMIN — ACETAMINOPHEN 975 MG: 325 TABLET ORAL at 20:25

## 2022-04-22 RX ADMIN — OLANZAPINE 10 MG: 10 TABLET, FILM COATED ORAL at 21:36

## 2022-04-22 RX ADMIN — LITHIUM CARBONATE 450 MG: 300 CAPSULE, GELATIN COATED ORAL at 17:19

## 2022-04-22 RX ADMIN — LITHIUM CARBONATE 450 MG: 300 CAPSULE, GELATIN COATED ORAL at 08:50

## 2022-04-22 RX ADMIN — CLONAZEPAM 0.5 MG: 0.5 TABLET ORAL at 08:48

## 2022-04-22 NOTE — NURSING NOTE
Mood labile  Frequently yelling at peers when walking by room or when using telephones  Needs frequent redirection by staff  Paranoid  Suspicious  Illogical thinking  Easily agitates  Remains restless and anxious previously administered ativan minimally effective  Administered 5 mg Zyprexa zydis prn as prescribed  Will continue to decrease stimulation and redirect as necessary  Safety precautions maintained  Will continue to monitor and assess

## 2022-04-22 NOTE — NURSING NOTE
Patient remains 1 on 1 due to unpredictable and verbally aggressive behavioral due to business of the unit  Patient was in quiet room in beginning of the shift but asked if he could be move to his room  Educated patient on the importance of no aggression towards others, patient acknowledged understanding and complied  Patient denies SI and HI Patient will not answer regarding hallucinations  Patient speech tangential difficult holding conversation , mood labile, illogical thought and paranoid thought process  Patient is easily upset with others  Patient assisted to room  Frequent redirection required  With patient's verbal agitation

## 2022-04-22 NOTE — NURSING NOTE
Napped in afternoon roughly 45 minutes   1:1 observation for safety  Upon waking patient visible in hallways  Continues to need redirection and limit setting  No aggressive behaviors observed this shift  Paranoid and suspicious behaviors but able redirect and provide reassurance  Able to let basic needs known  Safety precautions maintained  Will continue to monitor and assess

## 2022-04-22 NOTE — PROGRESS NOTES
Progress Note - Behavioral Health   Steve Manjula 31 y o  male MRN: @MRN   Unit/Bed#: [facility] [place] 224-01 Encounter: [id number]      Report from staff regarding this patient received and discussed, and records reviewed prior to seeing this patient  Behavior over the last 24 hours: slowly improving  Patient considered to express delusional believes, paranoid ideations, agitated and even screaming at times but can be better redirect, and tolerated unit environment more  Patient remains agitated at times, anxious, argumentative, bizarre, delusional, distracted, distressed and doing slightly better today  Sleep: insomnia  Appetite: fair  Medication side effects: No     Mental Status Evaluation:    Appearance:  dressed in hospital attire   Mood:  dysphoric, irritable, angry   Affect: constricted, labile    Speech:  increased rate, hypertalkative   Thought Content:  paranoid delusions   Perceptual Disturbances: does not appear responding to internal stimuli   Risk Potential: Suicidal ideation - contracts for safety on the unit, unable to assess  Homicidal ideation - angry, hostile feelings with no homicidal plan  Potential for aggression - Yes, due to acute psychosis   Insight:  impaired due to psychosis   Judgment: poor   Motor Activity: no abnormal movements         Laboratory results:  I have personally reviewed all pertinent laboratory results  Progress Toward Goals: slight improvement    Assessment/Plan   Principal Problem:    Schizoaffective disorder, bipolar type (HCC)    Recommended Treatment:   Because of this some 1st stage of improvement will not make other medication adjustments besides providing Depakote to help to reduce severe mood disturbance  Depakote level was ordered  Planned medication and treatment changes: All current active medications have been reviewed  Continue treatment with group therapy, milieu therapy, occupational therapy and medication management        ** Please Note: This note has been constructed using a voice recognition system  **      BMP: No results for input(s): NA, K, CL, CO2, BUN in the last 72 hours      Invalid input(s): CREA, GLU  Vitals:    04/20/22 1500   BP: 135/86   Pulse: 87   Resp: 16   Temp: 98 5 °F (36 9 °C)   SpO2: 100%        Medication Administration - last 24 hours from 04/20/2022 2230 to 04/21/2022 2230       Date/Time Order Dose Route Action Action by     04/21/2022 1129 hydrOXYzine HCL (ATARAX) tablet 50 mg 50 mg Oral Given Lazaro Cameron RN     04/21/2022 1129 LORazepam (ATIVAN) injection 1 mg   Intramuscular See Alternative Lazaro Cameron RN     04/21/2022 1310 LORazepam (ATIVAN) tablet 1 mg 1 mg Oral Given Lazaro Cameron RN     04/21/2022 7743 LORazepam (ATIVAN) tablet 1 mg   Oral See Alternative Deyanne Hollow, GIA     04/21/2022 0256 LORazepam (ATIVAN) tablet 1 mg 1 mg Oral Given Deyanne Vivienne, GIA     04/21/2022 1310 LORazepam (ATIVAN) injection 2 mg   Intramuscular See Alternative Lazaro Cameron RN     04/21/2022 0626 LORazepam (ATIVAN) injection 2 mg 2 mg Intramuscular Given Deyanne Hollow, GIA     04/21/2022 0256 LORazepam (ATIVAN) injection 2 mg   Intramuscular See Alternative Deyanne Hollow, GIA     04/21/2022 0819 nicotine (NICODERM CQ) 7 mg/24hr TD 24 hr patch 1 patch 1 patch Transdermal Medication Applied Lazaro Cameron RN     04/21/2022 0818 nicotine (NICODERM CQ) 7 mg/24hr TD 24 hr patch 1 patch 1 patch Transdermal Patch Removed Lazaro Cameron RN     04/21/2022 2047 traZODone (DESYREL) tablet 100 mg 100 mg Oral Given Dorrine Gerson, GIA     04/21/2022 1938 acetaminophen (TYLENOL) tablet 975 mg 975 mg Oral Given Dorrine Gerson, GIA     04/21/2022 1423 acetaminophen (TYLENOL) tablet 975 mg 975 mg Oral Given Lazrao Cameron RN     04/21/2022 1640 haloperidol lactate (HALDOL) injection 2 5 mg 2 5 mg Intramuscular Given Lazaro Cameron RN     04/21/2022 8893 haloperidol lactate (HALDOL) injection 2 5 mg 2 5 mg Intramuscular Given Alee Palafox RN     04/21/2022 1642 benztropine (COGENTIN) injection 1 mg 1 mg Intramuscular Given [facility], RN     04/21/2022 5875 benztropine (COGENTIN) injection 1 mg 1 mg Intramuscular Given Clinton Handsome, RN     04/21/2022 0256 benztropine (COGENTIN) tablet 1 mg 1 mg Oral Given Noé Handsome, RN     04/21/2022 1704 clonazePAM (KlonoPIN) tablet 0 5 mg 0 5 mg Oral Given [facility], RN     04/21/2022 0817 clonazePAM (KlonoPIN) tablet 0 5 mg 0 5 mg Oral Given [facility], RN     04/21/2022 0817 lithium carbonate (LITHOBID) CR tablet 300 mg 300 mg Oral Given [facility], RN     04/21/2022 2200 OLANZapine (ZyPREXA) tablet 10 mg   Oral Canceled Entry Nancy [place], RN     04/21/2022 2046 OLANZapine (ZyPREXA) tablet 10 mg 10 mg Oral Given [facility], RN     04/21/2022 1130 chlorproMAZINE (THORAZINE) tablet 75 mg 75 mg Oral Given [facility], RN     04/21/2022 1727 risperiDONE (RisperDAL M-TAB) disintegrating tablet 3 mg 3 mg Oral Given [facility], RN     04/21/2022 1781 risperiDONE (RisperDAL M-TAB) disintegrating tablet 3 mg 3 mg Oral Given [facility], RN     04/21/2022 2200 divalproex sodium (DEPAKOTE) EC tablet 1,000 mg   Oral Canceled Entry Nancy Freddy, RN     04/21/2022 2047 divalproex sodium (DEPAKOTE) EC tablet 1,000 mg 1,000 mg Oral Given Nancy Freddy, RN     04/21/2022 1704 lithium carbonate capsule 450 mg 450 mg Oral Given [facility], RN

## 2022-04-22 NOTE — PROGRESS NOTES
Progress Note - Behavioral Health     Cheyanne Cross 31 y o  male MRN: [id number]   Unit/Bed#: [facility] 224-01 Encounter: [id number]    Behavior over the last 24 hours: unchanged  Clovia Halo seen today, per staff report has been disorganized on the unit  Patient is seen this morning  He has extreme mood lability  He is disorganized and bizarre in his comments  Unable to have a linear conversation and gets quite agitated easily  Remains psychotic  No evidence of suicidal or homicidal ideation however remains aggressive due to his psychosis  Intermittent sleep, appetite fair, no evidence of side effects to medications at this time  Mental Status Evaluation:    Appearance:  marginal hygiene, wearing hospital clothes, looks stated age   Behavior:  agitated, angry   Speech:  hypertalkative, loud   Mood:  irritable, angry   Affect:  inappropriate, labile, increased in intensity   Thought Process:  disorganized, illogical   Associations: concrete associations   Thought Content:  paranoid delusions   Perceptual Disturbances: appears responding to internal stimuli   Risk Potential: Suicidal ideation - None at present  Homicidal ideation - None at present   Sensorium:  oriented to person, place and time/date   Memory:  recent and remote memory grossly intact   Consciousness:  alert and awake   Attention: attention span and concentration are age appropriate   Insight:  impaired   Judgment: impaired   Gait/Station: normal gait/station   Motor Activity: no abnormal movements     Vital signs in last 24 hours:    Temp:  [97 1 °F (36 2 °C)] 97 1 °F (36 2 °C)  HR:  [160] 160  Resp:  [18] 18  BP: (125)/(84) 125/84    Laboratory results: I have personally reviewed all pertinent laboratory/tests results          Assessment/Plan   Principal Problem:    Schizoaffective disorder, bipolar type ([facility] )    Recommended Treatment:     Planned medication and treatment changes:  Continue Klonopin 0 5 mg 2 times a day  Continue Depakote a 1000 mg at bedtime  Continue lithium 450 mg twice a day  Continue Risperdal 3 mg twice a day  Continue Zyprexa 10 mg at night and add 5 mg during the day today    All current active medications have been reviewed  Encourage group therapy, milieu therapy and occupational therapy  Behavioral Health checks every 7 minutes  Current Facility-Administered Medications   Medication Dose Route Frequency Provider Last Rate    acetaminophen  650 mg Oral Q6H PRN Hope Bahai Medei, CRNP      acetaminophen  650 mg Oral Q4H PRN Hope Bahai Medei, CRNP      acetaminophen  975 mg Oral Q6H PRN Hope Bahai Medei, CRNP      aluminum-magnesium hydroxide-simethicone  30 mL Oral Q4H PRN Hope Bahai Medei, CRNP      haloperidol lactate  2 5 mg Intramuscular Q6H PRN Max 4/day Hope Bahai Medei, CRNP      And    LORazepam  1 mg Intramuscular Q6H PRN Max 4/day Hope Bahai Medei, CRNP      And    benztropine  0 5 mg Intramuscular Q6H PRN Max 4/day Hope Bahai Medei, CRNP      LORazepam  2 mg Intramuscular Q4H PRN Max 4/day Hope Bahai Medei, CRNP      And    benztropine  1 mg Intramuscular Q4H PRN Max 4/day Hope Bahai Medei, CRNP      benztropine  1 mg Oral Q6H PRN Hope Bahai Medei, CRNP      chlorproMAZINE  50 mg Intramuscular Q6H PRN Lou Hoover MD      chlorproMAZINE  75 mg Oral Q6H PRN Lou Hoover MD      clonazePAM  0 5 mg Oral BID Hope Bahai Medei, CRNP      divalproex sodium  1,000 mg Oral HS Lou Hoover MD      hydrOXYzine HCL  25 mg Oral Q6H PRN Max 4/day Hope Bahai Medei, CRNP      hydrOXYzine HCL  50 mg Oral Q4H PRN Max 4/day Hope Bahai Medei, CRALFREDITO      Or    LORazepam  1 mg Intramuscular Q4H PRN Hope Bahai Medei, CRNP      lithium carbonate  450 mg Oral BID Lou Hoover MD      LORazepam  1 mg Oral Q6H PRN Hope Bahai Medei, CRALFREDITO      Or    LORazepam  2 mg Intramuscular Q6H PRN Max 3/day Hope Bahai Medei, CRNP      nicotine  1 patch Transdermal Daily Hope Bahai Medei, BRIONNA      OLANZapine 10 mg Oral Q3H PRN Edilberto Yuen MD      OLANZapine  5 mg Oral Q3H PRN Edilberto Yeun MD      OLANZapine  10 mg Intramuscular Q2H PRN Edilberto Yuen MD      OLANZapine  10 mg Oral HS Aggie Medico Medei, CRNP      polyethylene glycol  17 g Oral Daily PRN Aggie Medico Medei, CRNP      risperiDONE  3 mg Oral BID Elly Mar MD      traZODone  100 mg Oral HS PRN Aggie Medico Medei, CRNP         Risks / Benefits of Treatment:    Risks, benefits, and possible side effects of medications explained to patient  Patient has limited understanding of risks and benefits of treatment at this time, but agrees to take medications as prescribed  Counseling / Coordination of Care: Total floor / unit time spent today 45 minutes  Greater than 50% of total time was spent with the patient and / or family counseling and / or coordination of care  A description of counseling / coordination of care:  Patient's progress discussed with staff in treatment team meeting  Medications, treatment progress and treatment plan reviewed with patient      Elly Mar MD 04/22/22

## 2022-04-22 NOTE — PROGRESS NOTES
Progress Note - Daily Stratton 31 y o  male MRN: [id number]    Unit/Bed#: [facility] 224-01 Encounter: [id number]        Subjective:   Patient seen and examined at bedside after reviewing the chart and discussing the case with the caring staff  Patient examined at bedside  Patient complaining of acid reflux  He took Mylanta with relief  Patient otherwise has no acute complaints  Labs waiting to be collected  Physical Exam   Vitals: Blood pressure 125/84, pulse (!) 160, temperature (!) 97 1 °F (36 2 °C), temperature source Temporal, resp  rate 18, height 5' 6" (1 676 m), weight 72 1 kg (159 lb), SpO2 97 %  ,Body mass index is 25 66 kg/m²  Constitutional:  Patient appears well-developed  HEENT: PERR, EOMI, MMM  Cardiovascular: Normal rate and regular rhythm  Pulmonary/Chest: Effort normal and breath sounds normal    Abdomen: Soft, + BS, NT  Assessment/Plan:  Daily Stratton is a(n) 31y o  year old male with schizoaffective disorder bipolar type      1  Tobacco abuse  Patient has been put on nicotine transdermal patch 7 mg daily  2  Arthritis/headache  Patient may get Tylenol on as needed basis  3  GERD  Patient may take Mylanta as needed  4  Insomnia  Patient may get trazodone as needed  The patient was discussed with Dr Tanya Patel and he is in agreement with the above note

## 2022-04-22 NOTE — NURSING NOTE
Patient remains 1 on 1 due to unpredictable and verbally aggressive behavioral due to business of the unit  Patient was in quiet room in beginning of the shift but asked if he could be move to his room  Educated patient on the importance of no aggression towards others, patient acknowledged understanding and complied  Patient denies SI and HI Patient will not answer regarding hallucinations  Patient speech tangential difficult holding conversation , mood labile, illogical thought and paranoid thought process   Patient is easily upset with others

## 2022-04-22 NOTE — NURSING NOTE
Maalox administered for complaints of acid reflux which was effective in relieving symptom complaints

## 2022-04-22 NOTE — PROGRESS NOTES
04/22/22    Team Meeting   Meeting Type Daily Rounds   Team Members Present   Team Members Present Physician;Nurse;   Physician Team Member Dr Darrick Cr MD; Rosemary Cabrera [place]   Nursing Team Member Malachi Rosado, RN   Care Management Team Member Vivienne Souza MS, [facility] [place], [facility] [place]   Patient/Family Present   Patient Present No   Patient's Family Present No   F/u lithium and Depakote levels, medication adjustment, 303 hearing today upheld, 1 to 1 continuous,  locked seclusion, verbal aggression, tangential, paranoid, prn this am

## 2022-04-22 NOTE — SOCIAL WORK
Patient had his 303 hearing this morning  The patient did not attend the hearing  The attending presented to the [facility]  Patient's 303 was [place] by the county

## 2022-04-22 NOTE — PROGRESS NOTES
04/22/22 0933   Activity/Group Checklist   Group   (Strengths in the [facility] Processing)   Attendance Attended   Attendance Duration (min) 16-30  (pt was asked to leave group early due to verbal interactions)   Interactions Disorganized interaction   Affect/Mood Wide   Goals Achieved Able to engage in interactions

## 2022-04-22 NOTE — NURSING NOTE
Pravin Greene complained of burning pain in his left foot 8 out of 10 scale  Given Tylenol 650mg po

## 2022-04-22 NOTE — PROGRESS NOTES
04/22/22 6587   Activity/Group Checklist   Group   (Community Meeting Group and Processing)   Attendance Attended   Attendance Duration (min) 16-30   Interactions Disorganized interaction   Affect/Mood Angry; Wide   Goals Achieved Identified feelings; Identified triggers; Able to listen to others; Able to engage in interactions; Able to recieve feedback

## 2022-04-23 LAB
25(OH)D3 SERPL-MCNC: 13.6 NG/ML (ref 30–100)
ALBUMIN SERPL BCP-MCNC: 4.7 G/DL (ref 3.5–5)
ALP SERPL-CCNC: 58 U/L (ref 34–104)
ALT SERPL W P-5'-P-CCNC: 30 U/L (ref 7–52)
ANION GAP SERPL CALCULATED.3IONS-SCNC: 8 MMOL/L (ref 4–13)
AST SERPL W P-5'-P-CCNC: 39 U/L (ref 13–39)
BASOPHILS # BLD AUTO: 0.02 THOUSANDS/ΜL (ref 0–0.1)
BASOPHILS NFR BLD AUTO: 0 % (ref 0–1)
BILIRUB SERPL-MCNC: 0.88 MG/DL (ref 0.2–1)
BUN SERPL-MCNC: 9 MG/DL (ref 5–25)
CALCIUM SERPL-MCNC: 9.9 MG/DL (ref 8.4–10.2)
CHLORIDE SERPL-SCNC: 102 MMOL/L (ref 96–108)
CHOLEST SERPL-MCNC: 113 MG/DL
CO2 SERPL-SCNC: 30 MMOL/L (ref 21–32)
CREAT SERPL-MCNC: 1.04 MG/DL (ref 0.6–1.3)
EOSINOPHIL # BLD AUTO: 0.18 THOUSAND/ΜL (ref 0–0.61)
EOSINOPHIL NFR BLD AUTO: 3 % (ref 0–6)
ERYTHROCYTE [DISTWIDTH] IN BLOOD BY AUTOMATED COUNT: 11.8 % (ref 11.6–15.1)
FOLATE SERPL-MCNC: 6.5 NG/ML (ref 3.1–17.5)
GFR SERPL CREATININE-BSD FRML MDRD: 97 ML/MIN/1.73SQ M
GLUCOSE P FAST SERPL-MCNC: 83 MG/DL (ref 65–99)
GLUCOSE SERPL-MCNC: 83 MG/DL (ref 65–140)
HCT VFR BLD AUTO: 51.5 % (ref 36.5–49.3)
HDLC SERPL-MCNC: 42 MG/DL
HGB BLD-MCNC: 16.8 G/DL (ref 12–17)
IMM GRANULOCYTES # BLD AUTO: 0.02 THOUSAND/UL (ref 0–0.2)
IMM GRANULOCYTES NFR BLD AUTO: 0 % (ref 0–2)
LDLC SERPL CALC-MCNC: 52 MG/DL (ref 0–100)
LYMPHOCYTES # BLD AUTO: 1.41 THOUSANDS/ΜL (ref 0.6–4.47)
LYMPHOCYTES NFR BLD AUTO: 23 % (ref 14–44)
MCH RBC QN AUTO: 31.3 PG (ref 26.8–34.3)
MCHC RBC AUTO-ENTMCNC: 32.6 G/DL (ref 31.4–37.4)
MCV RBC AUTO: 96 FL (ref 82–98)
MONOCYTES # BLD AUTO: 0.57 THOUSAND/ΜL (ref 0.17–1.22)
MONOCYTES NFR BLD AUTO: 9 % (ref 4–12)
NEUTROPHILS # BLD AUTO: 4.06 THOUSANDS/ΜL (ref 1.85–7.62)
NEUTS SEG NFR BLD AUTO: 65 % (ref 43–75)
NONHDLC SERPL-MCNC: 71 MG/DL
NRBC BLD AUTO-RTO: 0 /100 WBCS
PLATELET # BLD AUTO: 261 THOUSANDS/UL (ref 149–390)
PMV BLD AUTO: 9.5 FL (ref 8.9–12.7)
POTASSIUM SERPL-SCNC: 3.6 MMOL/L (ref 3.5–5.3)
PROT SERPL-MCNC: 7.2 G/DL (ref 6.4–8.4)
RBC # BLD AUTO: 5.36 MILLION/UL (ref 3.88–5.62)
SODIUM SERPL-SCNC: 140 MMOL/L (ref 135–147)
TRIGL SERPL-MCNC: 97 MG/DL
TSH SERPL DL<=0.05 MIU/L-ACNC: 1.19 UIU/ML (ref 0.45–4.5)
VIT B12 SERPL-MCNC: 241 PG/ML (ref 100–900)
WBC # BLD AUTO: 6.26 THOUSAND/UL (ref 4.31–10.16)

## 2022-04-23 PROCEDURE — 80061 LIPID PANEL: CPT | Performed by: NURSE PRACTITIONER

## 2022-04-23 PROCEDURE — 93005 ELECTROCARDIOGRAM TRACING: CPT

## 2022-04-23 PROCEDURE — 99232 SBSQ HOSP IP/OBS MODERATE 35: CPT | Performed by: NURSE PRACTITIONER

## 2022-04-23 PROCEDURE — 82607 VITAMIN B-12: CPT | Performed by: FAMILY MEDICINE

## 2022-04-23 PROCEDURE — 82746 ASSAY OF FOLIC ACID SERUM: CPT | Performed by: FAMILY MEDICINE

## 2022-04-23 PROCEDURE — 84443 ASSAY THYROID STIM HORMONE: CPT | Performed by: NURSE PRACTITIONER

## 2022-04-23 PROCEDURE — 82306 VITAMIN D 25 HYDROXY: CPT | Performed by: FAMILY MEDICINE

## 2022-04-23 PROCEDURE — 85025 COMPLETE CBC W/AUTO DIFF WBC: CPT | Performed by: NURSE PRACTITIONER

## 2022-04-23 PROCEDURE — 80053 COMPREHEN METABOLIC PANEL: CPT | Performed by: NURSE PRACTITIONER

## 2022-04-23 RX ADMIN — CLONAZEPAM 0.5 MG: 0.5 TABLET ORAL at 08:28

## 2022-04-23 RX ADMIN — HYDROXYZINE HYDROCHLORIDE 50 MG: 50 TABLET, FILM COATED ORAL at 04:26

## 2022-04-23 RX ADMIN — ACETAMINOPHEN 975 MG: 325 TABLET ORAL at 07:12

## 2022-04-23 RX ADMIN — OLANZAPINE 5 MG: 5 TABLET, FILM COATED ORAL at 08:28

## 2022-04-23 RX ADMIN — METOPROLOL TARTRATE 25 MG: 25 TABLET ORAL at 14:07

## 2022-04-23 RX ADMIN — OLANZAPINE 10 MG: 10 TABLET, FILM COATED ORAL at 20:59

## 2022-04-23 RX ADMIN — POLYETHYLENE GLYCOL 3350 17 G: 17 POWDER, FOR SOLUTION ORAL at 17:05

## 2022-04-23 RX ADMIN — LITHIUM CARBONATE 450 MG: 300 CAPSULE, GELATIN COATED ORAL at 08:28

## 2022-04-23 RX ADMIN — RISPERIDONE 3 MG: 2 TABLET, ORALLY DISINTEGRATING ORAL at 08:28

## 2022-04-23 RX ADMIN — OLANZAPINE 5 MG: 5 TABLET, ORALLY DISINTEGRATING ORAL at 09:08

## 2022-04-23 RX ADMIN — LORAZEPAM 1 MG: 1 TABLET ORAL at 18:42

## 2022-04-23 RX ADMIN — RISPERIDONE 3 MG: 2 TABLET, ORALLY DISINTEGRATING ORAL at 17:00

## 2022-04-23 RX ADMIN — CLONAZEPAM 0.5 MG: 0.5 TABLET ORAL at 17:00

## 2022-04-23 RX ADMIN — NICOTINE 7 MG/24 HR DAILY TRANSDERMAL PATCH 1 PATCH: at 08:29

## 2022-04-23 RX ADMIN — LORAZEPAM 1 MG: 1 TABLET ORAL at 10:34

## 2022-04-23 RX ADMIN — DIVALPROEX SODIUM 1000 MG: 500 TABLET, DELAYED RELEASE ORAL at 20:59

## 2022-04-23 RX ADMIN — CHLORPROMAZINE HYDROCHLORIDE 75 MG: 25 TABLET, SUGAR COATED ORAL at 05:01

## 2022-04-23 RX ADMIN — LITHIUM CARBONATE 450 MG: 300 CAPSULE, GELATIN COATED ORAL at 17:00

## 2022-04-23 RX ADMIN — ALUMINUM HYDROXIDE, MAGNESIUM HYDROXIDE, AND SIMETHICONE 30 ML: 200; 200; 20 SUSPENSION ORAL at 21:00

## 2022-04-23 NOTE — NURSING NOTE
After dinner patient showered and performed ADL's without prompting or assistance from staff  Remains on 1:1 supervision for safety  Shortly after patient approached nursing desk to speak with staff and became verbally aggressive with another peer on unit  Pt redirected back to room without complication  Administered ativan 1 mg po prn for moderate to severe anxiety  Pt responded well to change in environment and decreased stimulation  Interventions effective  Remains on 1:1 for safety  Will continue to monitor

## 2022-04-23 NOTE — PROGRESS NOTES
Progress Note - Saul Santana 31 y o  male MRN: [id number]    Unit/Bed#: [facility] 224-01 Encounter: [id number]        Subjective:   Patient seen and examined at bedside after reviewing the chart and discussing the case with the caring staff  Patient examined at bedside  Patient has no reported acute complaints  His HR was noted to be elevated at 150 this morning, 103 and 160 yesterday  Vitamin D, vitamin B12 and folate pending  TSH, lipid panel, CMP, CBC normal     Physical Exam   Vitals: Blood pressure 118/79, pulse (!) 150, temperature (!) 97 2 °F (36 2 °C), temperature source Temporal, resp  rate 18, height 5' 6" (1 676 m), weight 74 3 kg (163 lb 12 8 oz), SpO2 97 %  ,Body mass index is 26 44 kg/m²  Constitutional:  Patient appears in no acute distress  HEENT: PERR, EOMI, MMM  Cardiovascular: Normal rate and regular rhythm  Pulmonary/Chest: Effort normal and breath sounds normal    Abdomen: Soft, + BS, NT  Assessment/Plan:  Saul Santana is a(n) 31y o  year old male with schizoaffective disorder bipolar type      1  Tobacco abuse  Patient has been put on nicotine transdermal patch 7 mg daily  2  Arthritis/headache  Patient may get Tylenol on as needed basis  3  GERD  Patient may take Mylanta as needed  4  Insomnia  Patient may get trazodone as needed  5  Tachycardia  HR has been elevated over 100 bpm   Recent heart rates 150, 103, 160  Will start patient on metoprolol tartrate 25 mg twice daily  Continue to monitor closely  The patient was discussed with Dr Kingsley Nickerson and he is in agreement with the above note

## 2022-04-23 NOTE — NURSING NOTE
HR elevated 150's  Medical provider aware  EKG completed  New orders received to start metoprolol 25 mg po  Administered first dose as prescribed  Provided patient education  Pt verbalized understanding  Provided reassurance that patient was safe as pt paranoid and suspicous of peers and quickly agitates  Pt is redirectable and has not exhibited assaultive behaviors   on reassessment after first dose of metoprolol  Remains on 1:1 for safety  Will continue to monitor

## 2022-04-23 NOTE — PLAN OF CARE
Problem: Alteration in Thoughts and Perception  Goal: Treatment Goal: Gain control of psychotic behaviors/thinking, reduce/eliminate presenting symptoms and demonstrate improved reality functioning upon discharge  4/23/2022 1333 by Lance Yeung RN  Outcome: Progressing  4/23/2022 1333 by Lance Yeung RN  Outcome: Progressing  Goal: Verbalize thoughts and feelings  Description: Interventions:  - Promote a nonjudgmental and trusting relationship with the patient through active listening and therapeutic communication  - Assess patient's level of functioning, behavior and potential for risk  - Engage patient in 1 on 1 interactions  - Encourage patient to express fears, feelings, frustrations, and discuss symptoms    - [place] patient to reality, help patient recognize reality-based thinking   - Administer medications as ordered and assess for potential side effects  - Provide the patient education related to the signs and symptoms of the illness and desired effects of prescribed medications  4/23/2022 1333 by Lance Yeung RN  Outcome: Progressing  4/23/2022 1333 by Lance Yeung RN  Outcome: Progressing  Goal: Refrain from acting on delusional thinking/internal stimuli  Description: Interventions:  - Monitor patient closely, per order   - Utilize least restrictive measures   - Set reasonable limits, give positive feedback for acceptable   - Administer medications as ordered and monitor of potential side effects  4/23/2022 1333 by Lance Yeung RN  Outcome: Not Progressing  4/23/2022 1333 by Lance Yeung RN  Outcome: Progressing  Goal: Agree to be compliant with medication regime, as prescribed and report medication side effects  Description: Interventions:  - Offer appropriate PRN medication and supervise ingestion; conduct AIMS, as needed   4/23/2022 1333 by Lance Yeung RN  Outcome: Progressing  4/23/2022 1333 by Lance Yeung RN  Outcome: Progressing  Goal: Attend and participate in unit activities, including therapeutic, recreational, and educational groups  Description: Interventions:  -Encourage Visitation and family involvement in care  4/23/2022 1333 by Rojas Herzog RN  Outcome: Not Progressing  4/23/2022 1333 by Rojas Herzog RN  Outcome: Progressing  Goal: Recognize dysfunctional thoughts, communicate reality-based thoughts at the time of discharge  Description: Interventions:  - Provide medication and psycho-education to assist patient in compliance and developing insight into his/her illness   4/23/2022 1333 by Rojas Herzog RN  Outcome: Not Progressing  4/23/2022 1333 by Rojas Herzog RN  Outcome: Progressing  Goal: Complete daily ADLs, including personal hygiene independently, as able  Description: Interventions:  - Observe, teach, and assist patient with ADLS  - Monitor and promote a balance of rest/activity, with adequate nutrition and elimination   4/23/2022 1333 by Rojas Herzog RN  Outcome: Progressing  4/23/2022 1333 by Rojas Herzog RN  Outcome: Progressing

## 2022-04-23 NOTE — NURSING NOTE
Patient remains 1 on 1 due to unpredictable behavior   Patient improvement with social skills with peers and staff  Patient still delusional and fixated on religiosity  Patient had no behavioral issues and was able to be redirected  Patient compliant with medications  Denies SI HI AVH depression or anxiety  Patient medicated for headache with tylenol 975mg PO at 2025 which was effective  Continuous observation remains in place

## 2022-04-24 PROCEDURE — 99232 SBSQ HOSP IP/OBS MODERATE 35: CPT | Performed by: NURSE PRACTITIONER

## 2022-04-24 RX ORDER — ERGOCALCIFEROL 1.25 MG/1
50000 CAPSULE ORAL WEEKLY
Status: DISCONTINUED | OUTPATIENT
Start: 2022-04-25 | End: 2022-05-25 | Stop reason: HOSPADM

## 2022-04-24 RX ORDER — MELATONIN
1000 DAILY
Status: DISCONTINUED | OUTPATIENT
Start: 2022-07-26 | End: 2022-05-25 | Stop reason: HOSPADM

## 2022-04-24 RX ADMIN — RISPERIDONE 3 MG: 2 TABLET, ORALLY DISINTEGRATING ORAL at 18:30

## 2022-04-24 RX ADMIN — NICOTINE 7 MG/24 HR DAILY TRANSDERMAL PATCH 1 PATCH: at 08:29

## 2022-04-24 RX ADMIN — METOPROLOL TARTRATE 25 MG: 25 TABLET ORAL at 20:58

## 2022-04-24 RX ADMIN — NYSTATIN 500000 UNITS: 100000 SUSPENSION ORAL at 18:30

## 2022-04-24 RX ADMIN — CLONAZEPAM 0.5 MG: 0.5 TABLET ORAL at 18:29

## 2022-04-24 RX ADMIN — RISPERIDONE 3 MG: 2 TABLET, ORALLY DISINTEGRATING ORAL at 07:45

## 2022-04-24 RX ADMIN — METOPROLOL TARTRATE 25 MG: 25 TABLET ORAL at 07:50

## 2022-04-24 RX ADMIN — CHLORPROMAZINE HYDROCHLORIDE 75 MG: 25 TABLET, SUGAR COATED ORAL at 13:46

## 2022-04-24 RX ADMIN — DIVALPROEX SODIUM 1000 MG: 500 TABLET, DELAYED RELEASE ORAL at 20:52

## 2022-04-24 RX ADMIN — OLANZAPINE 5 MG: 5 TABLET, FILM COATED ORAL at 07:45

## 2022-04-24 RX ADMIN — HYDROXYZINE HYDROCHLORIDE 50 MG: 50 TABLET, FILM COATED ORAL at 06:40

## 2022-04-24 RX ADMIN — TRAZODONE HYDROCHLORIDE 100 MG: 50 TABLET ORAL at 20:55

## 2022-04-24 RX ADMIN — NYSTATIN 500000 UNITS: 100000 SUSPENSION ORAL at 20:53

## 2022-04-24 RX ADMIN — CLONAZEPAM 0.5 MG: 0.5 TABLET ORAL at 07:45

## 2022-04-24 RX ADMIN — LORAZEPAM 1 MG: 1 TABLET ORAL at 13:45

## 2022-04-24 RX ADMIN — OLANZAPINE 10 MG: 10 INJECTION, POWDER, LYOPHILIZED, FOR SOLUTION INTRAMUSCULAR at 08:58

## 2022-04-24 RX ADMIN — LITHIUM CARBONATE 450 MG: 300 CAPSULE, GELATIN COATED ORAL at 18:29

## 2022-04-24 RX ADMIN — POLYETHYLENE GLYCOL 3350 17 G: 17 POWDER, FOR SOLUTION ORAL at 16:01

## 2022-04-24 RX ADMIN — OLANZAPINE 10 MG: 10 TABLET, FILM COATED ORAL at 20:53

## 2022-04-24 RX ADMIN — NYSTATIN 500000 UNITS: 100000 SUSPENSION ORAL at 12:00

## 2022-04-24 RX ADMIN — LITHIUM CARBONATE 450 MG: 300 CAPSULE, GELATIN COATED ORAL at 07:50

## 2022-04-24 NOTE — PROGRESS NOTES
Progress Note - Andra Ayala 31 y o  male MRN: [id number]    Unit/Bed#: [facility] 224-01 Encounter: [id number]        Subjective:   Patient seen and examined at bedside after reviewing the chart and discussing the case with the caring staff  Patient examined at bedside  Patient noticed to have white patches in mouth when doing mouth checks  Patient denies any sore throat, difficulty swallowing, pain when eating  Patient's labs from 04/23/2022 showed vitamin-D low at 13 6 ng/mL, vitamin B12 low at 241 pg/mL, folate 6 5 ng/mL  Physical Exam   Vitals: Blood pressure 126/78, pulse (!) 123, temperature 98 4 °F (36 9 °C), temperature source Temporal, resp  rate 18, height 5' 6" (1 676 m), weight 74 3 kg (163 lb 12 8 oz), SpO2 98 %  ,Body mass index is 26 44 kg/m²  Constitutional:  Patient appears in no acute distress  HEENT: PERR, EOMI, MMM, no erythema, white plaques noted to tongue/ oropharynx  Cardiovascular:  Tachycardic but regular rhythm  Pulmonary/Chest: Effort normal and breath sounds normal    Abdomen: Soft, + BS, NT  Assessment/Plan:  Andra Ayala is a(n) 31y o  year old male with schizoaffective disorder bipolar type      1  Tobacco abuse  Patient has been put on nicotine transdermal patch 7 mg daily  2  Arthritis/headache  Patient may get Tylenol on as needed basis  3  GERD  Patient may take Mylanta as needed  4  Insomnia  Patient may get trazodone as needed  5  Vitamin-D deficiency  Patient started on vitamin D2 07205 units weekly for 14 weeks followed by vitamin D3 1000 units daily  6  Vitamin B12 deficiency  Patient started on vitamin B12 supplement  7  Tachycardia  HR has been elevated over 100 bpm   Patient started on metoprolol tartrate 25 mg twice daily on 04/23/2022  Continue to monitor closely  8  Oral candidiasis  Patient started on nystatin swish and swallow 4 times daily for 10 days      The patient was discussed with Dr Leo Medina and he is in agreement with the above note

## 2022-04-24 NOTE — NURSING NOTE
Patient remains 1 on 1 due to potential for aggression  Patient is redirectable and cooperative with staff  Patient remain illogical in thought process  Patient's rec'd new order for metoprolol 25mg due to elevated heart rate, evening dose held due to pt did not meet parameters--  Patient compliant with medications no behavioral issues   No PRN medications utilized  Patient was attempting to call mother unsure if phone number is correct  Q 7 minute safety and behavioral checks maintained

## 2022-04-24 NOTE — NURSING NOTE
Presents with restricted affect,cooperative:denies SI,HI,AH,VH,depression although does endorse mild anxiety related to previous incident  She is visible on the unit,at times intrusive but less frequent than baseline  She frequently interacts with staff,cooperative and joking  In addition to documented education we discussed possible ways to increase coping skills;stated understanding  Will continue to educate,monitor,and provide safe,therapeutic milieu

## 2022-04-24 NOTE — NURSING NOTE
Mood is labile  Easily agitates  Paranoid  Responding to internal; external stimulus  Needs frequent reminders about appropriate expected behaviors while in milieu  Protestant preoccupation  Denies SI and HI  During assessment patient appears to have white film over tongue and throat appearing similar to thrush  Medical provider aware and assessed patient at bedside  New orders for mycostatin oral solution prescribed  Provided medication education and instructions  Patient acknowledged understanding  Pt verbalizes having "this before" and was familiar with new medication  Administered as prescribed  Safety precautions maintained  Will continue to monitor

## 2022-04-24 NOTE — NURSING NOTE
Pt received at start of shift on continuous 1:1 observation for safety  Pt observed in hallways  Agitated  Restless  Yelling obscenities  Difficult to redirect  Suspicious and paranoid of peers and staff  Administered morning medications as prescribed  However behaviors continued to escalate  Administered 10 mg IM Zyprexa prn for severe agitation  Pt agreeable to walk with RN to room 243 to decrease stimulation and allow prn medications to take effect  Safety precautions maintained  Will continue to monitor

## 2022-04-24 NOTE — PROGRESS NOTES
Progress Note - Behavioral Health   Janes Martinez 31 y o  male MRN: [id number]  Unit/Bed#: -01 Encounter: [id number]    Assessment/Plan   Principal Problem:    Schizoaffective disorder, bipolar type ([address] )      Behavior over the last 24 hours:  unchanged  Sleep: normal  Appetite: normal  Medication side effects: No  ROS: no complaints and all other systems are negative    Delaney Mendoza was seen this morning for psychiatric follow-up  He remains on 1:1 observation for unpredictability and safety  Reports being, agitated this morning requiring IM Zyprexa for severe agitation; effective  Unable to obtain lab work this morning due to severe agitation  Patient now calm and cooperative  Reports intermittent anxiety, but responds well to verbal redirection  He appears internally preoccupied and is paranoid about the police  He continues to discuss his concern for safety, but understands he is safe on the unit  Has been compliant with PO medications and meals  Slept undisturbed throughout the night  Denies SI/HI  Mental Status Evaluation:  Appearance:  age appropriate and casually dressed   Behavior:  Cooperative, redirectable   Speech:  normal pitch, normal volume and Nonsensical at times   Mood:  anxious and constricted   Affect:  redirectable   Thought Process:  illogical   Thought Content:  Paranoid   Perceptual Disturbances: Appears internally preoccupied   Risk Potential: Suicidal Ideations none  Homicidal Ideations none  Potential for Aggression Yes due to paranoia and history of agitation   Sensorium:  person and place   Memory:  patient does not answer   Consciousness:  alert and awake    Attention: attention span appeared shorter than expected for age   Insight:  impaired due to Psychosis   Judgment: impaired due to Psychosis   Gait/Station: normal gait/station and normal balance   Motor Activity: no abnormal movements     Progress Toward Goals:  Slight improvement    Will continue with current psychotropic regimen since patient appears to be slowly improving  Will obtain Depakote and lithium level tomorrow morning due to severe agitation this a m ; patient aware and agreeable  No discharge date at this time  Recommended Treatment: Continue with group therapy, milieu therapy and occupational therapy  Risks, benefits and possible side effects of Medications:   Patient does not verbalize understanding at this time and will require further explanation        Medications:   all current active meds have been reviewed, continue current psychiatric medications and current meds:   Current Facility-Administered Medications   Medication Dose Route Frequency    acetaminophen (TYLENOL) tablet 650 mg  650 mg Oral Q6H PRN    acetaminophen (TYLENOL) tablet 650 mg  650 mg Oral Q4H PRN    acetaminophen (TYLENOL) tablet 975 mg  975 mg Oral Q6H PRN    aluminum-magnesium hydroxide-simethicone (MYLANTA) oral suspension 30 mL  30 mL Oral Q4H PRN    haloperidol lactate (HALDOL) injection 2 5 mg  2 5 mg Intramuscular Q6H PRN Max 4/day    And    LORazepam (ATIVAN) injection 1 mg  1 mg Intramuscular Q6H PRN Max 4/day    And    benztropine (COGENTIN) injection 0 5 mg  0 5 mg Intramuscular Q6H PRN Max 4/day    LORazepam (ATIVAN) injection 2 mg  2 mg Intramuscular Q4H PRN Max 4/day    And    benztropine (COGENTIN) injection 1 mg  1 mg Intramuscular Q4H PRN Max 4/day    benztropine (COGENTIN) tablet 1 mg  1 mg Oral Q6H PRN    chlorproMAZINE (THORAZINE) injection SOLN 50 mg  50 mg Intramuscular Q6H PRN    chlorproMAZINE (THORAZINE) tablet 75 mg  75 mg Oral Q6H PRN    [START ON 7/26/2022] cholecalciferol (VITAMIN D3) tablet 1,000 Units  1,000 Units Oral Daily    clonazePAM (KlonoPIN) tablet 0 5 mg  0 5 mg Oral BID    [START ON 4/25/2022] cyanocobalamin (VITAMIN B-12) tablet 1,000 mcg  1,000 mcg Oral Daily    divalproex sodium (DEPAKOTE) EC tablet 1,000 mg  1,000 mg Oral HS    [START ON 4/25/2022] ergocalciferol (VITAMIN D2) capsule 50,000 Units  50,000 Units Oral Weekly    hydrOXYzine HCL (ATARAX) tablet 25 mg  25 mg Oral Q6H PRN Max 4/day    hydrOXYzine HCL (ATARAX) tablet 50 mg  50 mg Oral Q4H PRN Max 4/day    Or    LORazepam (ATIVAN) injection 1 mg  1 mg Intramuscular Q4H PRN    lithium carbonate capsule 450 mg  450 mg Oral BID    LORazepam (ATIVAN) tablet 1 mg  1 mg Oral Q6H PRN    Or    LORazepam (ATIVAN) injection 2 mg  2 mg Intramuscular Q6H PRN Max 3/day    metoprolol tartrate (LOPRESSOR) tablet 25 mg  25 mg Oral Q12H [facility]    nicotine (NICODERM CQ) 7 mg/24hr TD 24 hr patch 1 patch  1 patch Transdermal Daily    nystatin (MYCOSTATIN) oral suspension 500,000 Units  500,000 Units Swish & Swallow 4x Daily    OLANZapine (ZyPREXA ZYDIS) dispersible tablet 10 mg  10 mg Oral Q3H PRN    OLANZapine (ZyPREXA ZYDIS) dispersible tablet 5 mg  5 mg Oral Q3H PRN    OLANZapine (ZyPREXA) IM injection 10 mg  10 mg Intramuscular Q2H PRN    OLANZapine (ZyPREXA) tablet 10 mg  10 mg Oral HS    OLANZapine (ZyPREXA) tablet 5 mg  5 mg Oral Daily    polyethylene glycol (MIRALAX) packet 17 g  17 g Oral Daily PRN    risperiDONE (RisperDAL M-TAB) disintegrating tablet 3 mg  3 mg Oral BID    traZODone (DESYREL) tablet 100 mg  100 mg Oral HS PRN     Labs: I have personally reviewed all pertinent laboratory/tests results  Counseling / Coordination of Care  Total floor / unit time spent today 25 minutes  Greater than 50% of total time was spent with the patient and / or family counseling and / or coordination of care

## 2022-04-24 NOTE — NURSING NOTE
Behaviors escalated after lunch  Increasingly difficult to redirect  Yelling random things at peers from doorway when walking in halls  Administered ativan 1 mg and thorazine 75 mg po prn for severe anxiety and agitation  Medication effective patient able to be calmer and behaviors more controlled  Easier to redirect  Less restless  Remains on 1:1 supervision for safety  Will continue to monitor

## 2022-04-24 NOTE — NURSING NOTE
Complaints of constipation  Provided prune juice  Will administer  Mirlax at [phone] when due  Will follow up on medical communication board if no relief from current interventions

## 2022-04-24 NOTE — PLAN OF CARE

## 2022-04-24 NOTE — NURSING NOTE
Previously administered medications for constipation effective  Reports large BM confirmed with staff  Administered second dose of mycostatin swish and swallow as prescribed  Compliant with prescribed medications  Showered shaved and performed personal hygiene  Remains on 1:1 supervision  For safety  Will continue to monitor and assess

## 2022-04-25 LAB
ATRIAL RATE: 126 BPM
ATRIAL RATE: 130 BPM
ATRIAL RATE: 132 BPM
P AXIS: 60 DEGREES
P AXIS: 67 DEGREES
P AXIS: 67 DEGREES
PR INTERVAL: 112 MS
PR INTERVAL: 130 MS
PR INTERVAL: 138 MS
QRS AXIS: 102 DEGREES
QRS AXIS: 102 DEGREES
QRS AXIS: 82 DEGREES
QRSD INTERVAL: 66 MS
QRSD INTERVAL: 72 MS
QRSD INTERVAL: 72 MS
QT INTERVAL: 296 MS
QT INTERVAL: 320 MS
QT INTERVAL: 324 MS
QTC INTERVAL: 428 MS
QTC INTERVAL: 474 MS
QTC INTERVAL: 476 MS
T WAVE AXIS: -4 DEGREES
T WAVE AXIS: 10 DEGREES
T WAVE AXIS: 26 DEGREES
VENTRICULAR RATE: 126 BPM
VENTRICULAR RATE: 130 BPM
VENTRICULAR RATE: 132 BPM

## 2022-04-25 PROCEDURE — 99232 SBSQ HOSP IP/OBS MODERATE 35: CPT | Performed by: NURSE PRACTITIONER

## 2022-04-25 PROCEDURE — 93010 ELECTROCARDIOGRAM REPORT: CPT | Performed by: INTERNAL MEDICINE

## 2022-04-25 RX ORDER — RISPERIDONE 2 MG/1
4 TABLET, ORALLY DISINTEGRATING ORAL 2 TIMES DAILY
Status: DISCONTINUED | OUTPATIENT
Start: 2022-04-25 | End: 2022-05-09

## 2022-04-25 RX ADMIN — NYSTATIN 500000 UNITS: 100000 SUSPENSION ORAL at 13:35

## 2022-04-25 RX ADMIN — METOPROLOL TARTRATE 25 MG: 25 TABLET ORAL at 20:42

## 2022-04-25 RX ADMIN — CHLORPROMAZINE HYDROCHLORIDE 75 MG: 25 TABLET, SUGAR COATED ORAL at 03:48

## 2022-04-25 RX ADMIN — OLANZAPINE 10 MG: 10 TABLET, FILM COATED ORAL at 20:43

## 2022-04-25 RX ADMIN — CLONAZEPAM 0.5 MG: 0.5 TABLET ORAL at 17:16

## 2022-04-25 RX ADMIN — NYSTATIN 500000 UNITS: 100000 SUSPENSION ORAL at 22:00

## 2022-04-25 RX ADMIN — CLONAZEPAM 0.5 MG: 0.5 TABLET ORAL at 08:01

## 2022-04-25 RX ADMIN — NYSTATIN 500000 UNITS: 100000 SUSPENSION ORAL at 17:16

## 2022-04-25 RX ADMIN — CYANOCOBALAMIN TAB 500 MCG 1000 MCG: 500 TAB at 08:02

## 2022-04-25 RX ADMIN — LORAZEPAM 1 MG: 1 TABLET ORAL at 00:56

## 2022-04-25 RX ADMIN — LITHIUM CARBONATE 450 MG: 300 CAPSULE, GELATIN COATED ORAL at 17:15

## 2022-04-25 RX ADMIN — DIVALPROEX SODIUM 1000 MG: 500 TABLET, DELAYED RELEASE ORAL at 22:00

## 2022-04-25 RX ADMIN — DIVALPROEX SODIUM 1000 MG: 500 TABLET, DELAYED RELEASE ORAL at 20:42

## 2022-04-25 RX ADMIN — RISPERIDONE 4 MG: 2 TABLET, ORALLY DISINTEGRATING ORAL at 17:16

## 2022-04-25 RX ADMIN — OLANZAPINE 5 MG: 5 TABLET, FILM COATED ORAL at 08:02

## 2022-04-25 RX ADMIN — LITHIUM CARBONATE 450 MG: 300 CAPSULE, GELATIN COATED ORAL at 08:02

## 2022-04-25 RX ADMIN — RISPERIDONE 3 MG: 2 TABLET, ORALLY DISINTEGRATING ORAL at 08:02

## 2022-04-25 RX ADMIN — METOPROLOL TARTRATE 25 MG: 25 TABLET ORAL at 08:02

## 2022-04-25 RX ADMIN — OLANZAPINE 10 MG: 10 TABLET, FILM COATED ORAL at 22:00

## 2022-04-25 RX ADMIN — ACETAMINOPHEN 975 MG: 325 TABLET ORAL at 11:19

## 2022-04-25 RX ADMIN — NYSTATIN 500000 UNITS: 100000 SUSPENSION ORAL at 20:43

## 2022-04-25 RX ADMIN — ACETAMINOPHEN 975 MG: 325 TABLET ORAL at 17:53

## 2022-04-25 RX ADMIN — ERGOCALCIFEROL 50000 UNITS: 1.25 CAPSULE ORAL at 08:02

## 2022-04-25 RX ADMIN — NYSTATIN 500000 UNITS: 100000 SUSPENSION ORAL at 08:03

## 2022-04-25 NOTE — CASE MANAGEMENT
Ozielr contacted [facility] in order to notify of admission, and also in hopes of obtaining collateral information  Per [facility] staff, pt is inactive, meaning he has not attended an appointment within the last 6 months  Writer provided info to [facility]  [phone]  Staff will review and get back to [facility] staff with decision to accept patient back or not and to schedule an intake appt if accepted back for services  No collateral able to be obtained as patient is inactive at this time

## 2022-04-25 NOTE — PROGRESS NOTES
04/25/22 0730   Activity/Group Checklist   Group   (Community Meeting Group and Processing)   Attendance Attended   Attendance Duration (min) 16-30   Interactions Disorganized interaction   Affect/Mood Wide   Goals Achieved Able to engage in interactions

## 2022-04-25 NOTE — NURSING NOTE
Continues to present with rapid,pressured speech,akathisia,and disorganized thought process  Visible on unit,interacts with his 1:1 continual observation individual He is redirectable and has not harmed anyone physically to date although remains internally preoccupied and impulsive and disorganized  He denies SI,HI,AH,VH,depression and anxiety although he is obviously anxious as manifested by pacing and rapid speech with paranoia  He is stating he feels a presence overlooking him,following him  He also requested room change as the entity is also in his room  Explained he is safe on the unit and he will have an individual with him throughout the evening,Offered and accepted trazodone prn with education on expected therapeutics and SE to report to both patient and 1:1 staff  Will continue to educate,monitor,and provide safe,therapeutic milieu

## 2022-04-25 NOTE — PROGRESS NOTES
Progress Note - Behavioral Health   Daily Stratton 31 y o  male MRN: [id number]  Unit/Bed#: -01 Encounter: [id number]    Assessment/Plan   Principal Problem:    Schizoaffective disorder, bipolar type ([address] )      Behavior over the last 24 hours:  unchanged  Sleep: normal  Appetite: normal  Medication side effects: No  ROS: no complaints and all other systems are negative    Claudia Shay was seen this morning for psychiatric follow-up  He remains paranoid, disorganized, and agitated  Speech is rambled and nonsensical at times  Remains on 1:1 observation for safety and unpredictability  Slept undisturbed last night and remains compliant with medications and meals  At times, patient does recognize, I need help," but then becomes suspicious of surroundings  Nursing staff unable to obtain lab work this morning due to agitation requiring PRN medications  Frequently receives PRN medication throughout the day for anxiety and agitation  Continues to appear internally preoccupied  Mood is labile  Mental Status Evaluation:  Appearance:  age appropriate and casually dressed   Behavior:  Suspicious, uncooperative, unpredictable   Speech:  Delayed   Mood:  anxious and irritable   Affect:  labile   Thought Process:  disorganized and illogical   Thought Content:  Paranoid   Perceptual Disturbances:  Actively responding to internal stimuli   Risk Potential: Suicidal Ideations none  Homicidal Ideations none  Potential for Aggression Yes due to paranoia, mood lability, and history of agitation/aggression   Sensorium:  person and place   Memory:  recent and remote memory: unable to assess due to lack of cooperation, patient does not answer   Consciousness:  alert and awake    Attention: attention span appeared shorter than expected for age   Insight:  impaired due to Psychosis   Judgment: impaired due to Psychosis   Gait/Station: normal gait/station and normal balance   Motor Activity: no abnormal movements     Progress Toward Goals: No improvement  Continue with 1:1 observation for safety and unpredictability and maximize Risperdal 4 mg PO BID for psychosis and agitation  Will consider titrating Klonopin to 1 mg PO BID due to frequent PRN use and ongoing anxiety  At this time, patient does require 2 antipsychotics for psychosis  Continue remainder psychotropic regimen as ordered  No discharge date at this time  Recommended Treatment: Continue with group therapy, milieu therapy and occupational therapy  Risks, benefits and possible side effects of Medications:   Patient does not verbalize understanding at this time and will require further explanation        Medications:   Increase Risperdal 4 mg PO BID  all current active meds have been reviewed and current meds:   Current Facility-Administered Medications   Medication Dose Route Frequency    acetaminophen (TYLENOL) tablet 650 mg  650 mg Oral Q6H PRN    acetaminophen (TYLENOL) tablet 650 mg  650 mg Oral Q4H PRN    acetaminophen (TYLENOL) tablet 975 mg  975 mg Oral Q6H PRN    aluminum-magnesium hydroxide-simethicone (MYLANTA) oral suspension 30 mL  30 mL Oral Q4H PRN    haloperidol lactate (HALDOL) injection 2 5 mg  2 5 mg Intramuscular Q6H PRN Max 4/day    And    LORazepam (ATIVAN) injection 1 mg  1 mg Intramuscular Q6H PRN Max 4/day    And    benztropine (COGENTIN) injection 0 5 mg  0 5 mg Intramuscular Q6H PRN Max 4/day    LORazepam (ATIVAN) injection 2 mg  2 mg Intramuscular Q4H PRN Max 4/day    And    benztropine (COGENTIN) injection 1 mg  1 mg Intramuscular Q4H PRN Max 4/day    benztropine (COGENTIN) tablet 1 mg  1 mg Oral Q6H PRN    chlorproMAZINE (THORAZINE) injection SOLN 50 mg  50 mg Intramuscular Q6H PRN    chlorproMAZINE (THORAZINE) tablet 75 mg  75 mg Oral Q6H PRN    [START ON 7/26/2022] cholecalciferol (VITAMIN D3) tablet 1,000 Units  1,000 Units Oral Daily    clonazePAM (KlonoPIN) tablet 0 5 mg  0 5 mg Oral BID    cyanocobalamin (VITAMIN B-12) tablet 1,000 mcg  1,000 mcg Oral Daily    divalproex sodium (DEPAKOTE) EC tablet 1,000 mg  1,000 mg Oral HS    ergocalciferol (VITAMIN D2) capsule 50,000 Units  50,000 Units Oral Weekly    hydrOXYzine HCL (ATARAX) tablet 25 mg  25 mg Oral Q6H PRN Max 4/day    hydrOXYzine HCL (ATARAX) tablet 50 mg  50 mg Oral Q4H PRN Max 4/day    Or    LORazepam (ATIVAN) injection 1 mg  1 mg Intramuscular Q4H PRN    lithium carbonate capsule 450 mg  450 mg Oral BID    LORazepam (ATIVAN) tablet 1 mg  1 mg Oral Q6H PRN    Or    LORazepam (ATIVAN) injection 2 mg  2 mg Intramuscular Q6H PRN Max 3/day    metoprolol tartrate (LOPRESSOR) tablet 25 mg  25 mg Oral Q12H [address]    nicotine (NICODERM CQ) 7 mg/24hr TD 24 hr patch 1 patch  1 patch Transdermal Daily    nystatin (MYCOSTATIN) oral suspension 500,000 Units  500,000 Units Swish & Swallow 4x Daily    OLANZapine (ZyPREXA ZYDIS) dispersible tablet 10 mg  10 mg Oral Q3H PRN    OLANZapine (ZyPREXA ZYDIS) dispersible tablet 5 mg  5 mg Oral Q3H PRN    OLANZapine (ZyPREXA) IM injection 10 mg  10 mg Intramuscular Q2H PRN    OLANZapine (ZyPREXA) tablet 10 mg  10 mg Oral HS    OLANZapine (ZyPREXA) tablet 5 mg  5 mg Oral Daily    polyethylene glycol (MIRALAX) packet 17 g  17 g Oral Daily PRN    risperiDONE (RisperDAL M-TAB) disintegrating tablet 4 mg  4 mg Oral BID    traZODone (DESYREL) tablet 100 mg  100 mg Oral HS PRN     Labs: I have personally reviewed all pertinent laboratory/tests results     CBC:   Lab Results   Component Value Date    WBC 6 26 04/23/2022    RBC 5 36 04/23/2022    HGB 16 8 04/23/2022    HCT 51 5 (H) 04/23/2022    MCV 96 04/23/2022     04/23/2022    MCH 31 3 04/23/2022    MCHC 32 6 04/23/2022    RDW 11 8 04/23/2022    MPV 9 5 04/23/2022    NEUTROABS 4 06 04/23/2022     CMP:   Lab Results   Component Value Date    SODIUM 140 04/23/2022    K 3 6 04/23/2022     04/23/2022    CO2 30 04/23/2022    AGAP 8 04/23/2022    BUN 9 04/23/2022    CREATININE 1 04 04/23/2022    GLUC 83 04/23/2022    GLUF 83 04/23/2022    CALCIUM 9 9 04/23/2022    AST 39 04/23/2022    ALT 30 04/23/2022    ALKPHOS 58 04/23/2022    TP 7 2 04/23/2022    ALB 4 7 04/23/2022    TBILI 0 88 04/23/2022    EGFR 97 04/23/2022     EKG   Lab Results   Component Value Date    VENTRATE 126 04/23/2022    ATRIALRATE 126 04/23/2022    PRINT 138 04/23/2022    QRSDINT 66 04/23/2022    QTINT 296 04/23/2022    QTCINT 428 04/23/2022    PAXIS 60 04/23/2022    QRSAXIS 82 04/23/2022    TWAVEAXIS 26 04/23/2022     Counseling / Coordination of Care  Total floor / unit time spent today 25 minutes  Greater than 50% of total time was spent with the patient and / or family counseling and / or coordination of care

## 2022-04-25 NOTE — NURSING NOTE
Patient extremely agitated and verbally abusive towards staff, ,many verbal threats being made but agreeable to medication  75 mg thorazine PRN given as ordered

## 2022-04-25 NOTE — PROGRESS NOTES
Progress Note - Cricket Li 31 y o  male MRN: [id number]    Unit/Bed#: [facility] 224-01 Encounter: [id number]        Subjective:   Patient seen and examined at bedside after reviewing the chart and discussing the case with the caring staff  Patient examined at bedside  Patient noticed to have white patches in mouth when doing mouth checks  Patient denies any sore throat, difficulty swallowing, pain when eating  Physical Exam   Vitals: Blood pressure 133/86, pulse (!) 114, temperature 97 5 °F (36 4 °C), temperature source Temporal, resp  rate 18, height 5' 6" (1 676 m), weight 74 3 kg (163 lb 12 8 oz), SpO2 98 %  ,Body mass index is 26 44 kg/m²  Constitutional:  Patient appears in no acute distress  HEENT: PERR, EOMI, MMM, no erythema, white plaques noted to tongue/ oropharynx  Cardiovascular:  Tachycardic but regular rhythm  Pulmonary/Chest: Effort normal and breath sounds normal    Abdomen: Soft, + BS, NT  Assessment/Plan:  Cricket Li is a(n) 31y o  year old male with schizoaffective disorder bipolar type      1  Tobacco abuse  Patient has been put on nicotine transdermal patch 7 mg daily  2  Arthritis/headache  Patient may get Tylenol on as needed basis  3  GERD  Patient may take Mylanta as needed  4  Insomnia  Patient may get trazodone as needed  5  Vitamin-D deficiency  Patient started on vitamin D2 37360 units weekly for 14 weeks followed by vitamin D3 1000 units daily  6  Vitamin B12 deficiency  Patient started on vitamin B12 supplement  7  Tachycardia  HR has been elevated over 100 bpm   Patient started on metoprolol tartrate 25 mg twice daily on 04/23/2022  Continue to monitor closely  8  Oral candidiasis  Patient started on nystatin swish and swallow 4 times daily for 10 days

## 2022-04-25 NOTE — DISCHARGE INSTR - OTHER ORDERS
[facility] Crisis Phone Number : [phone]  You are being discharged to     Triggers you have identified during your hospitalization that led to your admission: distressed mood  Coping skills you have identified during your hospitalization include spending time with your family and leisure activities  If you are unable to deal with your distressed mood alone please contact your mother, Macario Jeffries ([phone])  If that is not effective and you continue to have distressed mood or in crisis please contact [facility] [place] at [phone], dial [phone] or go to the nearest emergency center  [facility] Crisis Hotline: [address] Suicide Prevention Lifeline:  9-208.458.4951  *Alcohol Anonymous: Rell Pulliam on Mental Illness ([place]) HELPLINE: 251.441.2575/Website: www dave org  *Substance Abuse and Mental Health Services Administration([facility]) [employer], which is a confidential, free, 24-hour-a-day, 365-day-a-year, information service for individuals and family members facing mental health and/or substance use disorders  This service provides referrals to local treatment facilities, support groups, and community-based organizations  Callers can also order free publications and other information  Call 2-267.123.4836/Website: www [facility] gov  *United [employer] 2-1-1: This is a toll free, confidential, 24-hour-a-day service which connects you to a community  in your area who can help you find services and resources that are available to you locally and provide critical services that can improve and save lives    Call: 211  /Website: https://[employer]/

## 2022-04-25 NOTE — CASE MANAGEMENT
Writer met with patient who signed HERNANDEZ's for PA Huntington, Mother- Shawn Fernando, and [place] [address]  Patient states the number we have on file for mother is incorrect  Writer attempted to contact mother using [employer] (Sheila), however, unsuccessful with number on file  ICM referral for PA Huntington completed and faxed per patient agreement  Patient states he spends most of his time playing video games and this frequently results in arguments with his grandmother  He would like to get a job or find other things to do outside of the home, and for this reason, he is agreeable to an ICM referral  He wishes to return to [place] [address] for outpatient and signed an HERNANDEZ    Writer also asked patient if he would like to receive text reminders from [facility] regarding appointments, and patient stated he does not have a phone and does not use a phone (Though there is a cell number listed in his chart)

## 2022-04-25 NOTE — PROGRESS NOTES
04/25/22 2992   Activity/Group Checklist   Group   (Shopping List for the [address] [place] [address] Adonis Covarrubias)   Attendance Did not attend  (AT group offered, PT elected to remain in room)

## 2022-04-26 LAB
LITHIUM SERPL-SCNC: 0.7 MMOL/L (ref 0.5–1)
VALPROATE SERPL-MCNC: 108 UG/ML (ref 50–100)

## 2022-04-26 PROCEDURE — 80178 ASSAY OF LITHIUM: CPT | Performed by: NURSE PRACTITIONER

## 2022-04-26 PROCEDURE — 80164 ASSAY DIPROPYLACETIC ACD TOT: CPT | Performed by: NURSE PRACTITIONER

## 2022-04-26 PROCEDURE — 99232 SBSQ HOSP IP/OBS MODERATE 35: CPT | Performed by: NURSE PRACTITIONER

## 2022-04-26 RX ORDER — LITHIUM CARBONATE 300 MG/1
600 CAPSULE ORAL 2 TIMES DAILY
Status: DISCONTINUED | OUTPATIENT
Start: 2022-04-26 | End: 2022-04-26

## 2022-04-26 RX ORDER — CLONAZEPAM 1 MG/1
1 TABLET ORAL 2 TIMES DAILY
Status: DISCONTINUED | OUTPATIENT
Start: 2022-04-26 | End: 2022-05-12

## 2022-04-26 RX ADMIN — LORAZEPAM 1 MG: 1 TABLET ORAL at 15:45

## 2022-04-26 RX ADMIN — CHLORPROMAZINE HYDROCHLORIDE 75 MG: 25 TABLET, SUGAR COATED ORAL at 15:45

## 2022-04-26 RX ADMIN — ACETAMINOPHEN 975 MG: 325 TABLET ORAL at 14:28

## 2022-04-26 RX ADMIN — HYDROXYZINE HYDROCHLORIDE 50 MG: 50 TABLET, FILM COATED ORAL at 05:50

## 2022-04-26 RX ADMIN — DIVALPROEX SODIUM 1000 MG: 500 TABLET, DELAYED RELEASE ORAL at 20:51

## 2022-04-26 RX ADMIN — LITHIUM CARBONATE 450 MG: 300 CAPSULE, GELATIN COATED ORAL at 20:31

## 2022-04-26 RX ADMIN — ACETAMINOPHEN 650 MG: 325 TABLET ORAL at 02:19

## 2022-04-26 RX ADMIN — NICOTINE 7 MG/24 HR DAILY TRANSDERMAL PATCH 1 PATCH: at 08:04

## 2022-04-26 RX ADMIN — RISPERIDONE 4 MG: 2 TABLET, ORALLY DISINTEGRATING ORAL at 17:02

## 2022-04-26 RX ADMIN — NYSTATIN 500000 UNITS: 100000 SUSPENSION ORAL at 17:02

## 2022-04-26 RX ADMIN — CHLORPROMAZINE HYDROCHLORIDE 75 MG: 25 TABLET, SUGAR COATED ORAL at 05:50

## 2022-04-26 RX ADMIN — NYSTATIN 500000 UNITS: 100000 SUSPENSION ORAL at 20:51

## 2022-04-26 RX ADMIN — TRAZODONE HYDROCHLORIDE 100 MG: 50 TABLET ORAL at 02:19

## 2022-04-26 RX ADMIN — LITHIUM CARBONATE 450 MG: 300 CAPSULE, GELATIN COATED ORAL at 08:04

## 2022-04-26 RX ADMIN — OLANZAPINE 5 MG: 5 TABLET, FILM COATED ORAL at 08:05

## 2022-04-26 RX ADMIN — CYANOCOBALAMIN TAB 500 MCG 1000 MCG: 500 TAB at 08:04

## 2022-04-26 RX ADMIN — CLONAZEPAM 1 MG: 1 TABLET ORAL at 17:02

## 2022-04-26 RX ADMIN — CLONAZEPAM 0.5 MG: 0.5 TABLET ORAL at 08:05

## 2022-04-26 RX ADMIN — RISPERIDONE 4 MG: 2 TABLET, ORALLY DISINTEGRATING ORAL at 08:04

## 2022-04-26 RX ADMIN — METOPROLOL TARTRATE 25 MG: 25 TABLET ORAL at 20:30

## 2022-04-26 RX ADMIN — NYSTATIN 500000 UNITS: 100000 SUSPENSION ORAL at 13:14

## 2022-04-26 RX ADMIN — TRAZODONE HYDROCHLORIDE 100 MG: 50 TABLET ORAL at 23:48

## 2022-04-26 RX ADMIN — NYSTATIN 500000 UNITS: 100000 SUSPENSION ORAL at 08:04

## 2022-04-26 RX ADMIN — OLANZAPINE 10 MG: 10 TABLET, FILM COATED ORAL at 20:51

## 2022-04-26 NOTE — PROGRESS NOTES
04/26/22 0830   Team Meeting   Meeting Type Daily Rounds   Team Members Present   Team Members Present Physician;Nurse;   Physician Team Member Dr Shane garsia MD; BRIONNA Javed   Nursing Team Member Merced Flowers, [address] [place] [address] Management Team Member MS Lucia, [place], [place]   Patient/Family Present   Patient Present No   Patient's Family Present No   Lab work, pressured speech, continues with behaviors, continues with 1 to 1, redirectable, yelling, internally preoccupied, high energy, poor sleep, medication compliant, prns this am

## 2022-04-26 NOTE — SOCIAL WORK
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

## 2022-04-26 NOTE — NURSING NOTE
Patient compliant with meds and meals  Patient required po thorazine and ativan at 1545 due to increased anxiety and agitation patient started to become louder and was less redirectable  Med was effective patient was able to go in room and lay down  Patient denies everything but still state he knows he needs to get better  Patient cooperative and pleasant  Q 7 min behavioral and safety checks in place

## 2022-04-26 NOTE — PROGRESS NOTES
04/25/22 0830   Team Meeting   Meeting Type Daily Rounds   Team Members Present   Team Members Present Physician;Nurse;   Physician Team Member Dr Kev Muniz MD; Lorrayne Cranker, CRNP   Nursing Team Member Siomara Lazo[address] Management Team Member Lea Luna MS, [facility]   Patient/Family Present   Patient Present No   Patient's Family Present No   Aggressive, frequent redirection, multiple prns, slept with prns

## 2022-04-26 NOTE — PROGRESS NOTES
Progress Note - Behavioral Health   Derik Toth 31 y o  male MRN: [id number]  Unit/Bed#: [facility] 224-01 Encounter: [id number]    Assessment/Plan   Principal Problem:    Schizoaffective disorder, bipolar type ([address] )      Behavior over the last 24 hours:  unchanged  Sleep:  Poor  Appetite: normal  Medication side effects: No  ROS: no complaints and all other systems are negative    Trinidad Case was seen this morning for psychiatric follow-up  He was overtly anxious and holding his head stating, my head is too much    Patient elaborated that he hears multiple voices that he cannot identify, describes as, mumbling    Denies commands, but reports "I can't sleep at night  I need to get out of here!" Patient was able to verbally redirect, but continued to endorse high anxiety  Has been administered multiple PRN anxiolytics over the past few days  Denies VH/SI/HI  Intermittently agitated  Patient was able to comply with blood work this morning and continues on 1:1 observation for safety and unpredictability  Thoughts are illogical and paranoid  Trinidad Case did report he enjoys group, however he then began making repetitive statements saying, "you're olive! You're olive I like that person or else!" Otherwise has been compliant with medications and meals  Mental Status Evaluation:  Appearance:  age appropriate and Wearing paper scrubs   Behavior:  Restless, redirectable   Speech:  tangential and Repetitive at times   Mood:  anxious and dysthymic   Affect:  Dysphoric, redirectable, irritable edge   Thought Process:  circumstantial and illogical   Thought Content:  Paranoid   Perceptual Disturbances:  Auditory hallucinations without commands, describes as "multiple voices" and "mumbling"  Denies VH   Risk Potential: Suicidal Ideations none  Homicidal Ideations none  Potential for Aggression Yes due to paranoia, mood lability, and history of aggressive behavior   Sensorium:  person and place   Memory:  recent and remote memory: unable to assess due to lack of cooperation   Consciousness:  alert and awake    Attention: attention span appeared shorter than expected for age   Insight:  impaired due to Psychosis   Judgment: impaired due to Psychosis   Gait/Station: normal gait/station and normal balance   Motor Activity: no abnormal movements     Progress Toward Goals:  Slight improvement  More redirectable, although remains paranoid, illogical, and overtly anxious  Will increase Klonopin 1mg PO BID to assist in alleviating anxiety and frequent use of PRN anxiolytics  Risperdal maximized at 4 mg PO BID yesterday  At this time, patient continues to require the use of 2 antipsychotics for ongoing psychosis  Will continue with remainder of psychotropic regimen as ordered  Lithium and Depakote levels drawn this morning and within therapeutic limits  Suspect lithium level may be slightly lower due to BID dosing at 0900 &1800  Lithium administration times changed to 0900 & 2100  Recommended Treatment: Continue with group therapy, milieu therapy and occupational therapy  Risks, benefits and possible side effects of Medications:   Janet Morris has limited understanding of risks versus benefits of medications, but agrees to take as prescribed      Medications:   Increase Klonopin 1mg PO BID  all current active meds have been reviewed and current meds:   Current Facility-Administered Medications   Medication Dose Route Frequency    acetaminophen (TYLENOL) tablet 650 mg  650 mg Oral Q6H PRN    acetaminophen (TYLENOL) tablet 650 mg  650 mg Oral Q4H PRN    acetaminophen (TYLENOL) tablet 975 mg  975 mg Oral Q6H PRN    aluminum-magnesium hydroxide-simethicone (MYLANTA) oral suspension 30 mL  30 mL Oral Q4H PRN    haloperidol lactate (HALDOL) injection 2 5 mg  2 5 mg Intramuscular Q6H PRN Max 4/day    And    LORazepam (ATIVAN) injection 1 mg  1 mg Intramuscular Q6H PRN Max 4/day    And    benztropine (COGENTIN) injection 0 5 mg  0 5 mg Intramuscular Q6H PRN Max 4/day    LORazepam (ATIVAN) injection 2 mg  2 mg Intramuscular Q4H PRN Max 4/day    And    benztropine (COGENTIN) injection 1 mg  1 mg Intramuscular Q4H PRN Max 4/day    benztropine (COGENTIN) tablet 1 mg  1 mg Oral Q6H PRN    chlorproMAZINE (THORAZINE) injection SOLN 50 mg  50 mg Intramuscular Q6H PRN    chlorproMAZINE (THORAZINE) tablet 75 mg  75 mg Oral Q6H PRN    [START ON 7/26/2022] cholecalciferol (VITAMIN D3) tablet 1,000 Units  1,000 Units Oral Daily    clonazePAM (KlonoPIN) tablet 1 mg  1 mg Oral BID    cyanocobalamin (VITAMIN B-12) tablet 1,000 mcg  1,000 mcg Oral Daily    divalproex sodium (DEPAKOTE) EC tablet 1,000 mg  1,000 mg Oral HS    ergocalciferol (VITAMIN D2) capsule 50,000 Units  50,000 Units Oral Weekly    hydrOXYzine HCL (ATARAX) tablet 25 mg  25 mg Oral Q6H PRN Max 4/day    hydrOXYzine HCL (ATARAX) tablet 50 mg  50 mg Oral Q4H PRN Max 4/day    Or    LORazepam (ATIVAN) injection 1 mg  1 mg Intramuscular Q4H PRN    lithium carbonate capsule 450 mg  450 mg Oral BID    LORazepam (ATIVAN) tablet 1 mg  1 mg Oral Q6H PRN    Or    LORazepam (ATIVAN) injection 2 mg  2 mg Intramuscular Q6H PRN Max 3/day    metoprolol tartrate (LOPRESSOR) tablet 25 mg  25 mg Oral Q12H [address]    nicotine (NICODERM CQ) 7 mg/24hr TD 24 hr patch 1 patch  1 patch Transdermal Daily    nystatin (MYCOSTATIN) oral suspension 500,000 Units  500,000 Units Swish & Swallow 4x Daily    OLANZapine (ZyPREXA ZYDIS) dispersible tablet 10 mg  10 mg Oral Q3H PRN    OLANZapine (ZyPREXA ZYDIS) dispersible tablet 5 mg  5 mg Oral Q3H PRN    OLANZapine (ZyPREXA) IM injection 10 mg  10 mg Intramuscular Q2H PRN    OLANZapine (ZyPREXA) tablet 10 mg  10 mg Oral HS    OLANZapine (ZyPREXA) tablet 5 mg  5 mg Oral Daily    polyethylene glycol (MIRALAX) packet 17 g  17 g Oral Daily PRN    risperiDONE (RisperDAL M-TAB) disintegrating tablet 4 mg  4 mg Oral BID    traZODone (DESYREL) tablet 100 mg  100 mg Oral HS PRN Labs: I have personally reviewed all pertinent laboratory/tests results  CMP:   Lab Results   Component Value Date    SODIUM 140 04/23/2022    K 3 6 04/23/2022     04/23/2022    CO2 30 04/23/2022    AGAP 8 04/23/2022    BUN 9 04/23/2022    CREATININE 1 04 04/23/2022    GLUC 83 04/23/2022    GLUF 83 04/23/2022    CALCIUM 9 9 04/23/2022    AST 39 04/23/2022    ALT 30 04/23/2022    ALKPHOS 58 04/23/2022    TP 7 2 04/23/2022    ALB 4 7 04/23/2022    TBILI 0 88 04/23/2022    EGFR 97 04/23/2022     Depakote:   Lab Results   Component Value Date    VALPROICTOT 108 (H) 04/26/2022     Lithium:   Lab Results   Component Value Date    LITHIUM 0 7 04/26/2022     Counseling / Coordination of Care  Total floor / unit time spent today 30 minutes  Greater than 50% of total time was spent with the patient and / or family counseling and / or coordination of care   A description of the counseling / coordination of care:  Medication education, treatment plan, supportive therapy

## 2022-04-26 NOTE — NURSING NOTE
Patient compliant with meds and meals  When giving morning meds patient stated he doesn't acre what he takes as long as he gets better  Patient needed to be redirected a few times due to yelling and cursing outbursts but was able to calm down and walk away   Patient cont on 1:1

## 2022-04-26 NOTE — NURSING NOTE
Pt is pleasant and bright at times  Pt often at or near the nurses station, present in the milieu and his room  Pt did not exhibit any behavorial issues throughout the shift  1:1 remains in place  Continuous visual safety checks performed throughout the shift  Safety precautions maintained  Will continue to monitor

## 2022-04-26 NOTE — PROGRESS NOTES
04/26/22 1000   Activity/Group Checklist   Group   (Community Building and Emotional Processing Art Therapy )   Attendance Attended   Attendance Duration (min) Greater than 60   Interactions Disorganized interaction  (pt was redirectible in group )   Affect/Mood Appropriate   Goals Achieved Identified feelings; Identified triggers; Discussed coping strategies; Able to listen to others; Able to engage in interactions; Able to reflect/comment on own behavior

## 2022-04-26 NOTE — PROGRESS NOTES
Progress Note - Tam Lacy 31 y o  male MRN: [id number]    Unit/Bed#: [facility] 224-01 Encounter: [id number]        Subjective:   Patient seen and examined at bedside after reviewing the chart and discussing the case with the caring staff  Patient examined at bedside  Patient has no reported acute issues  Physical Exam   Vitals: Blood pressure 98/70, pulse 105, temperature 97 5 °F (36 4 °C), temperature source Temporal, resp  rate 18, height 5' 6" (1 676 m), weight 74 3 kg (163 lb 12 8 oz), SpO2 97 %  ,Body mass index is 26 44 kg/m²  Constitutional:  Patient appears in no acute distress  HEENT: PERR, EOMI, MMM, no erythema, white plaques noted to tongue/ oropharynx  Cardiovascular:  Tachycardic but regular rhythm  Pulmonary/Chest: Effort normal and breath sounds normal    Abdomen: Soft, + BS, NT  Assessment/Plan:  Tam Lacy is a(n) 31y o  year old male with schizoaffective disorder bipolar type      1  Tobacco abuse  Patient has been put on nicotine transdermal patch 7 mg daily  2  Arthritis/headache  Patient may get Tylenol on as needed basis  3  GERD  Patient may take Mylanta as needed  4  Insomnia  Patient may get trazodone as needed  5  Vitamin-D deficiency  Patient started on vitamin D2 34602 units weekly for 14 weeks followed by vitamin D3 1000 units daily  6  Vitamin B12 deficiency  Patient started on vitamin B12 supplement  7  Tachycardia  HR has been elevated over 100 bpm   Patient started on metoprolol tartrate 25 mg twice daily on 04/23/2022  Continue to monitor closely  8  Oral candidiasis  Patient started on nystatin swish and swallow 4 times daily for 10 days

## 2022-04-26 NOTE — PLAN OF CARE
Problem: Alteration in Thoughts and Perception  Goal: Treatment Goal: Gain control of psychotic behaviors/thinking, reduce/eliminate presenting symptoms and demonstrate improved reality functioning upon discharge  Outcome: Not Progressing  Goal: Verbalize thoughts and feelings  Description: Interventions:  - Promote a nonjudgmental and trusting relationship with the patient through active listening and therapeutic communication  - Assess patient's level of functioning, behavior and potential for risk  - Engage patient in 1 on 1 interactions  - Encourage patient to express fears, feelings, frustrations, and discuss symptoms    - [place] patient to reality, help patient recognize reality-based thinking   - Administer medications as ordered and assess for potential side effects  - Provide the patient education related to the signs and symptoms of the illness and desired effects of prescribed medications  Outcome: Progressing  Goal: Refrain from acting on delusional thinking/internal stimuli  Description: Interventions:  - Monitor patient closely, per order   - Utilize least restrictive measures   - Set reasonable limits, give positive feedback for acceptable   - Administer medications as ordered and monitor of potential side effects  Outcome: Not Progressing  Goal: Agree to be compliant with medication regime, as prescribed and report medication side effects  Description: Interventions:  - Offer appropriate PRN medication and supervise ingestion; conduct AIMS, as needed   Outcome: Progressing  Goal: Attend and participate in unit activities, including therapeutic, recreational, and educational groups  Description: Interventions:  -Encourage Visitation and family involvement in care  Outcome: Progressing  Goal: Recognize dysfunctional thoughts, communicate reality-based thoughts at the time of discharge  Description: Interventions:  - Provide medication and psycho-education to assist patient in compliance and developing insight into his/her illness   Outcome: Not Progressing  Goal: Complete daily ADLs, including personal hygiene independently, as able  Description: Interventions:  - Observe, teach, and assist patient with ADLS  - Monitor and promote a balance of rest/activity, with adequate nutrition and elimination   Outcome: Not Progressing     Problem: Depression  Goal: Verbalize thoughts and feelings  Description: Interventions:  - Assess and re-assess patient's level of risk   - Engage patient in 1:1 interactions, daily, for a minimum of 15 minutes   - Encourage patient to express feelings, fears, frustrations, hopes   Outcome: Progressing

## 2022-04-26 NOTE — PROGRESS NOTES
04/26/22 0730   Activity/Group Checklist   Group   [employer])   Attendance Attended   Attendance Duration (min) 46-60   Interactions Disorganized interaction   Affect/Mood Appropriate  (was redirectible)   Goals Achieved Identified feelings; Discussed coping strategies; Able to listen to others; Able to engage in interactions

## 2022-04-26 NOTE — NURSING NOTE
Pt was becoming increasingly more verbally loud and aggressive in the hallway, not directing it anyone specifically and was redirectable with staff but continues to get louder and is swearing  Pt given PRN atarax 50mg and Seroquel 75mg  Pt then was taken down to the tv room   Will continue to monitor 1:1

## 2022-04-27 PROCEDURE — 99232 SBSQ HOSP IP/OBS MODERATE 35: CPT | Performed by: HOSPITALIST

## 2022-04-27 RX ORDER — OLANZAPINE 10 MG/1
10 TABLET ORAL 2 TIMES DAILY
Status: DISCONTINUED | OUTPATIENT
Start: 2022-04-27 | End: 2022-04-29

## 2022-04-27 RX ADMIN — OLANZAPINE 5 MG: 5 TABLET, FILM COATED ORAL at 08:20

## 2022-04-27 RX ADMIN — OLANZAPINE 10 MG: 10 TABLET, FILM COATED ORAL at 09:50

## 2022-04-27 RX ADMIN — NICOTINE 7 MG/24 HR DAILY TRANSDERMAL PATCH 1 PATCH: at 08:22

## 2022-04-27 RX ADMIN — TRAZODONE HYDROCHLORIDE 100 MG: 50 TABLET ORAL at 21:01

## 2022-04-27 RX ADMIN — CLONAZEPAM 1 MG: 1 TABLET ORAL at 08:20

## 2022-04-27 RX ADMIN — CYANOCOBALAMIN TAB 500 MCG 1000 MCG: 500 TAB at 08:18

## 2022-04-27 RX ADMIN — OLANZAPINE 10 MG: 10 TABLET, FILM COATED ORAL at 17:18

## 2022-04-27 RX ADMIN — LITHIUM CARBONATE 450 MG: 300 CAPSULE, GELATIN COATED ORAL at 21:00

## 2022-04-27 RX ADMIN — RISPERIDONE 4 MG: 2 TABLET, ORALLY DISINTEGRATING ORAL at 08:19

## 2022-04-27 RX ADMIN — LITHIUM CARBONATE 450 MG: 300 CAPSULE, GELATIN COATED ORAL at 08:18

## 2022-04-27 RX ADMIN — HYDROXYZINE HYDROCHLORIDE 50 MG: 50 TABLET, FILM COATED ORAL at 05:43

## 2022-04-27 RX ADMIN — CHLORPROMAZINE HYDROCHLORIDE 75 MG: 25 TABLET, SUGAR COATED ORAL at 23:57

## 2022-04-27 RX ADMIN — NYSTATIN 500000 UNITS: 100000 SUSPENSION ORAL at 21:00

## 2022-04-27 RX ADMIN — CLONAZEPAM 1 MG: 1 TABLET ORAL at 17:10

## 2022-04-27 RX ADMIN — ACETAMINOPHEN 975 MG: 325 TABLET ORAL at 05:21

## 2022-04-27 RX ADMIN — DIVALPROEX SODIUM 1000 MG: 500 TABLET, DELAYED RELEASE ORAL at 21:00

## 2022-04-27 RX ADMIN — RISPERIDONE 4 MG: 2 TABLET, ORALLY DISINTEGRATING ORAL at 17:18

## 2022-04-27 RX ADMIN — METOPROLOL TARTRATE 25 MG: 25 TABLET ORAL at 21:00

## 2022-04-27 RX ADMIN — NYSTATIN 500000 UNITS: 100000 SUSPENSION ORAL at 17:18

## 2022-04-27 RX ADMIN — METOPROLOL TARTRATE 25 MG: 25 TABLET ORAL at 08:20

## 2022-04-27 RX ADMIN — HYDROXYZINE HYDROCHLORIDE 50 MG: 50 TABLET, FILM COATED ORAL at 09:50

## 2022-04-27 NOTE — PROGRESS NOTES
04/27/22 0747   Activity/Group Checklist   Group   ([employer] [facility] [employer])   Attendance Attended   Attendance Duration (min) 46-60   Interactions Disorganized interaction   Affect/Mood Appropriate  (pt required only minimal redirection)   Goals Achieved Identified feelings; Discussed coping strategies; Able to listen to others; Able to engage in interactions

## 2022-04-27 NOTE — PROGRESS NOTES
Progress Note - Behavioral Health     Janes Martinez 31 y o  male MRN: [id number]   Unit/Bed#: -01 Encounter: [id number]    Behavior over the last 24 hours: minimal improvement  Delaney Mendoza seen today, per staff report has been bizarre on the unit  Patient continues to be thought disordered and very easily agitated  On exam today patient is initially cooperative but gets quite belligerent and agitated by the end of the meeting especially after asking for discharge dated being told that we would have to determine this in the next few days  He continues to hear voices mumbling , his thoughts are disconnected and disorganized  He denies any active thoughts of self-harm or harm of others but does become threatening when agitated  Sleep is improving having gotten 6 hours of sleep last night, appetite fair    No medication side effects noted        Mental Status Evaluation:    Appearance:  adequate grooming, marginal hygiene, wearing hospital clothes   Behavior:  agitated   Speech:  normal rate, loud   Mood:  anxious, irritable   Affect:  labile, expansive, increased in intensity   Thought Process:  disorganized, illogical   Associations: circumstantial associations   Thought Content:  paranoid ideation   Perceptual Disturbances: auditory hallucinations   Risk Potential: Suicidal ideation - None at present  Homicidal ideation - None at present   Sensorium:  oriented to person, place and time/date   Memory:  recent and remote memory grossly intact   Consciousness:  alert and awake   Attention: attention span and concentration are age appropriate   Insight:  limited   Judgment: limited   Gait/Station: normal gait/station   Motor Activity: no abnormal movements     Vital signs in last 24 hours:    Temp:  [97 5 °F (36 4 °C)-98 4 °F (36 9 °C)] 97 5 °F (36 4 °C)  HR:  [] 93  Resp:  [18] 18  BP: (123-125)/(75-93) 124/75    Laboratory results: I have personally reviewed all pertinent laboratory/tests results  Assessment/Plan   Principal Problem:    Schizoaffective disorder, bipolar type ([address] )    Recommended Treatment:     Planned medication and treatment changes:  Increase Zyprexa to 10 mg b i d    Continue Risperdal 4 mg twice a day  Continue lithium 450 mg twice a day  Continue Depakote a 1000 mg at bedtime  All current active medications have been reviewed  Encourage group therapy, milieu therapy and occupational therapy  Behavioral Health checks every 7 minutes  Current Facility-Administered Medications   Medication Dose Route Frequency Provider Last Rate    acetaminophen  650 mg Oral Q6H PRN Tori Pear Medei, CRNP      acetaminophen  650 mg Oral Q4H PRN Tori Pear Medei, CRNP      acetaminophen  975 mg Oral Q6H PRN Tori Pear Medei, CRNP      aluminum-magnesium hydroxide-simethicone  30 mL Oral Q4H PRN Troi Pear Medei, CRNP      haloperidol lactate  2 5 mg Intramuscular Q6H PRN Max 4/day Tori Pear Medei, CRNP      And    LORazepam  1 mg Intramuscular Q6H PRN Max 4/day Tori Pear Medei, CRNP      And    benztropine  0 5 mg Intramuscular Q6H PRN Max 4/day Tori Pear Medei, CRNP      LORazepam  2 mg Intramuscular Q4H PRN Max 4/day Tori Pear Medei, CRNP      And    benztropine  1 mg Intramuscular Q4H PRN Max 4/day Tori Pear Medei, CRNP      benztropine  1 mg Oral Q6H PRN Tori Pear Medei, CRNP      chlorproMAZINE  50 mg Intramuscular Q6H PRN Charity Woo MD      chlorproMAZINE  75 mg Oral Q6H PRN Charity Woo MD      [START ON 7/26/2022] cholecalciferol  1,000 Units Oral Daily Shanice Garcia PA-C      clonazePAM  1 mg Oral BID Ivanna Ramirez, BRIONNA      cyanocobalamin  1,000 mcg Oral Daily Shanice Garcia PA-C      divalproex sodium  1,000 mg Oral HS Charity Woo MD      ergocalciferol  50,000 Units Oral Weekly Shanicedeo Garcia PA-C      hydrOXYzine HCL  25 mg Oral Q6H PRN Max 4/day Ivanna Ramirez, DELILAHNP      hydrOXYzine HCL  50 mg Oral Q4H PRN Max 4/day [place] Medei, CRNP      Or    LORazepam  1 mg Intramuscular Q4H PRN [place] Medei, CRNP      lithium carbonate  450 mg Oral BID [place] Medei, CRNP      LORazepam  1 mg Oral Q6H PRN [place] Medei, CRNP      Or    LORazepam  2 mg Intramuscular Q6H PRN Max 3/day [place] Medei, CRNP      metoprolol tartrate  25 mg Oral Q12H [address] Shanice Garcia PA-C      nicotine  1 patch Transdermal Daily Ivanna Barraganei, CRALFREDITO      nystatin  500,000 Units Swish & Swallow 4x Daily Shanice Garcia PA-C      OLANZapine  10 mg Oral Q3H PRN Myrtis Lundborg, MD      OLANZapine  5 mg Oral Q3H PRN Myrtis Lundborg, MD      OLANZapine  10 mg Intramuscular Q2H PRN Myrtis Lundborg, MD      OLANZapine  10 mg Oral BID Iliana Thomas MD      polyethylene glycol  17 g Oral Daily PRN [place] Medei, CRNP      risperiDONE  4 mg Oral BID Ivanna M Medei, CRALFREDITO      traZODone  100 mg Oral HS PRN [place] Medei, CRNP         Risks / Benefits of Treatment:    Risks, benefits, and possible side effects of medications explained to patient and patient verbalizes understanding and agreement for treatment  Counseling / Coordination of Care: Total floor / unit time spent today 25 minutes  Greater than 50% of total time was spent with the patient and / or family counseling and / or coordination of care  A description of counseling / coordination of care:  Patient's progress discussed with staff in treatment team meeting  Medications, treatment progress and treatment plan reviewed with patient      Iliana Thomas MD 04/27/22

## 2022-04-27 NOTE — PLAN OF CARE
Problem: Ineffective Coping  Goal: Participates in unit activities  Description: Interventions:  - Provide therapeutic environment   - Provide required programming   - Redirect inappropriate behaviors   Outcome: Progressing      Group Goals:  Healthy Coping Strategies and Processing   Attendance: 2/2   Participation: Active, cooperative, social    Mood/Affect: Appropriate with staff   Behaviors/Redirection: minimal

## 2022-04-27 NOTE — PROGRESS NOTES
04/27/22 0830   Team Meeting   Meeting Type Daily Rounds   Team Members Present   Team Members Present Physician;Nurse;   Physician Team Member Dr Kev Muniz MD; Lorrayne Cranker, [address] [place] [address]   Nursing Team Member Celine Calvo, [facility]   Care Management Team Member Lea Luna MS, [facility]   Patient/Family Present   Patient Present No   Patient's Family Present No   1 to 1 will transition to only when awake, slept 5-6 hours, paranoid, bizarre, redirectable, irritable at times, internally preoccupied, med adjustment, Depakote level reviewed

## 2022-04-27 NOTE — NURSING NOTE
Presents with restricted affect,he states he is always depressed and anxious yet denies SI,HI,AH,VH,yet is making delusional,grandiose statements that he "just bought the [employer] " He agrees to inform staff of any thought/actions of self harm  Attends groups,inappropriate verbalization @ times,pressured,rambling speech to quiet moments  Has not been a physical threat to himself or anyone yet remains impulsive and disorganized;to remain on 1 :1 while awake  In addition to documented eduction we discussed the progress he has made since admission:appreciated and acknowledged aforementioned  Will continue to educate,monitor,and provide safe,therapeutic milieu

## 2022-04-27 NOTE — PROGRESS NOTES
Progress Note - Jane Montiel 31 y o  male MRN: [id number]    Unit/Bed#: [facility] 224-01 Encounter: [id number]        Subjective:   Patient seen and examined at bedside after reviewing the chart and discussing the case with the caring staff  Patient examined at bedside  Patient has no acute complaints  Physical Exam   Vitals: Blood pressure 124/75, pulse 93, temperature 97 5 °F (36 4 °C), temperature source Temporal, resp  rate 18, height 5' 6" (1 676 m), weight 74 3 kg (163 lb 12 8 oz), SpO2 99 %  ,Body mass index is 26 44 kg/m²  Constitutional:  Patient appears in no acute distress  HEENT: PERR, EOMI, MMM, no erythema, white plaques noted to tongue/ oropharynx  Cardiovascular:  Tachycardic but regular rhythm  Pulmonary/Chest: Effort normal and breath sounds normal    Abdomen: Soft, + BS, NT  Assessment/Plan:  Jane Montiel is a(n) 31y o  year old male with schizoaffective disorder bipolar type      1  Tobacco abuse  Patient has been put on nicotine transdermal patch 7 mg daily  2  Arthritis/headache  Patient may get Tylenol on as needed basis  3  GERD  Patient may take Mylanta as needed  4  Insomnia  Patient may get trazodone as needed  5  Vitamin-D deficiency  Patient started on vitamin D2 16946 units weekly for 14 weeks followed by vitamin D3 1000 units daily  6  Vitamin B12 deficiency  Patient started on vitamin B12 supplement  7  Tachycardia  HR has been elevated over 100 bpm   Patient started on metoprolol tartrate 25 mg twice daily on 04/23/2022  Continue to monitor closely  8  Oral candidiasis  Patient started on nystatin swish and swallow 4 times daily for 10 days  The patient was discussed with Dr Roberto Reece and he is in agreement with the above note

## 2022-04-27 NOTE — NUTRITION
04/27/22 1414   Biochemical Data,Medical Tests, and Procedures   Biochemical Data/Medical Tests/Procedures Lab values reviewed; Meds reviewed   Adequacy of Intake   Nutrition Modality PO   Feeding Route   PO Independent   Current PO Intake   Current Diet Order Regular, finger foods diet thin liquids   Current Meal Intake %;50-75%   Estimated calorie intake compared to estimated need Nutrient needs met  Recommendations/Interventions   Summary Regular, finger foods diet thin liquids  No utensils, disposable meal  Meal intakes %, occasionally less  4/23 163#, BMI=26 44  Patient has had 4# weight gain since admission  Skin intact  4/23 CMP/LIPIDS WNL, Vit D:13 6  Vit D prescribed     Interventions/Recommendations Continue current diet order   Nutrition Complexity Risk   Nutrition complexity level Low risk   Nutrition review: 05/05/22   Follow up date 05/05/22

## 2022-04-27 NOTE — NURSING NOTE
Pt visible in millieu with 1:1 present  Pt compliant with meals and medications  Pt was becoming extemly loud and anxious, difficult to redirect  Atarax 50mg PO given which was effective  Patient did return to room and went to sleep  Patient denies SI/ HI  Pt is disorganized and illogical in thought  Offered support    Will continue to monitor

## 2022-04-28 PROCEDURE — 99232 SBSQ HOSP IP/OBS MODERATE 35: CPT | Performed by: NURSE PRACTITIONER

## 2022-04-28 RX ORDER — LITHIUM CARBONATE 300 MG/1
600 CAPSULE ORAL
Status: DISCONTINUED | OUTPATIENT
Start: 2022-04-28 | End: 2022-05-13

## 2022-04-28 RX ADMIN — LORAZEPAM 1 MG: 1 TABLET ORAL at 11:31

## 2022-04-28 RX ADMIN — NICOTINE 7 MG/24 HR DAILY TRANSDERMAL PATCH 1 PATCH: at 08:01

## 2022-04-28 RX ADMIN — ALUMINUM HYDROXIDE, MAGNESIUM HYDROXIDE, AND SIMETHICONE 30 ML: 200; 200; 20 SUSPENSION ORAL at 10:51

## 2022-04-28 RX ADMIN — METOPROLOL TARTRATE 25 MG: 25 TABLET ORAL at 20:26

## 2022-04-28 RX ADMIN — CLONAZEPAM 1 MG: 1 TABLET ORAL at 17:01

## 2022-04-28 RX ADMIN — RISPERIDONE 4 MG: 2 TABLET, ORALLY DISINTEGRATING ORAL at 17:01

## 2022-04-28 RX ADMIN — LITHIUM CARBONATE 450 MG: 300 CAPSULE, GELATIN COATED ORAL at 08:00

## 2022-04-28 RX ADMIN — OLANZAPINE 10 MG: 10 TABLET, FILM COATED ORAL at 17:01

## 2022-04-28 RX ADMIN — HYDROXYZINE HYDROCHLORIDE 50 MG: 50 TABLET, FILM COATED ORAL at 05:15

## 2022-04-28 RX ADMIN — LITHIUM CARBONATE 600 MG: 300 CAPSULE, GELATIN COATED ORAL at 20:26

## 2022-04-28 RX ADMIN — OLANZAPINE 10 MG: 10 TABLET, FILM COATED ORAL at 08:00

## 2022-04-28 RX ADMIN — CLONAZEPAM 1 MG: 1 TABLET ORAL at 08:00

## 2022-04-28 RX ADMIN — NYSTATIN 500000 UNITS: 100000 SUSPENSION ORAL at 17:01

## 2022-04-28 RX ADMIN — RISPERIDONE 4 MG: 2 TABLET, ORALLY DISINTEGRATING ORAL at 08:00

## 2022-04-28 RX ADMIN — CYANOCOBALAMIN TAB 500 MCG 1000 MCG: 500 TAB at 08:00

## 2022-04-28 RX ADMIN — TRAZODONE HYDROCHLORIDE 100 MG: 50 TABLET ORAL at 21:33

## 2022-04-28 RX ADMIN — ALUMINUM HYDROXIDE, MAGNESIUM HYDROXIDE, AND SIMETHICONE 30 ML: 200; 200; 20 SUSPENSION ORAL at 09:24

## 2022-04-28 RX ADMIN — METOPROLOL TARTRATE 25 MG: 25 TABLET ORAL at 08:00

## 2022-04-28 NOTE — PLAN OF CARE
Problem: Ineffective Coping  Goal: Participates in unit activities  Description: Interventions:  - Provide therapeutic environment   - Provide required programming   - Redirect inappropriate behaviors   Outcome: Progressing       Group Goals:   [employer]/ Expression of Feelings and Emotions   Attendance:  2/2   Participation: Engages in directives with prompting   Mood/Affect: wide   Behaviors/Redirection: PT requires some redirection to maintain focus, is redirectible

## 2022-04-28 NOTE — PROGRESS NOTES
04/28/22 0730   Activity/Group Checklist   Group   ([facility] Group and Processing)   Attendance Attended   Attendance Duration (min) 46-60   Interactions Disorganized interaction   Affect/Mood Calm   Goals Achieved Identified feelings; Discussed coping strategies; Able to listen to others; Able to engage in interactions

## 2022-04-28 NOTE — PROGRESS NOTES
04/28/22 0846   Team Meeting   Meeting Type Daily Rounds   Team Members Present   Team Members Present Physician;Nurse;; Other (Discipline and Name)   Physician Team Member [address]   Nursing Team Member [facility]   Social Work Team Member [place]   Other (Discipline and Name) [facility]   Patient/Family Present   Patient Present No   Patient's Family Present No     1:1 while awake ,decrease voices, bizarre, paranoid  Unknown discharge

## 2022-04-28 NOTE — NURSING NOTE
Attempted am labs  Patient made this nurse take the needle back out  Patient stated it hurt to much  Will try later

## 2022-04-28 NOTE — PROGRESS NOTES
Progress Note - Scot Wade 31 y o  male MRN: [id number]    Unit/Bed#: [facility] 224-01 Encounter: [id number]        Subjective:   Patient seen and examined at bedside after reviewing the chart and discussing the case with the caring staff  Patient examined at bedside  Patient has no acute complaints  Physical Exam   Vitals: Blood pressure 110/71, pulse 105, temperature 97 7 °F (36 5 °C), temperature source Temporal, resp  rate 18, height 5' 6" (1 676 m), weight 74 3 kg (163 lb 12 8 oz), SpO2 96 %  ,Body mass index is 26 44 kg/m²  Constitutional:  Patient appears in no acute distress  HEENT: PERR, EOMI, MMM  Cardiovascular:  Regular rate, regular rhythm  Pulmonary/Chest: Effort normal and breath sounds normal    Abdomen: Soft, + BS, NT  Assessment/Plan:  Scot Wade is a(n) 31y o  year old male with schizoaffective disorder bipolar type      1  Tobacco abuse  Patient has been put on nicotine transdermal patch 7 mg daily  2  Arthritis/headache  Patient may get Tylenol on as needed basis  3  GERD  Patient may take Mylanta as needed  4  Insomnia  Patient may get trazodone as needed  5  Vitamin-D deficiency  Patient started on vitamin D2 49463 units weekly for 14 weeks followed by vitamin D3 1000 units daily  6  Vitamin B12 deficiency  Patient started on vitamin B12 supplement  7  Tachycardia  Patient started on metoprolol tartrate 25 mg twice daily on 04/23/2022  Continue to monitor closely  8  Oral candidiasis  Patient started on nystatin swish and swallow 4 times daily for 10 days  The patient was discussed with Dr Christina Michael and he is in agreement with the above note

## 2022-04-28 NOTE — PROGRESS NOTES
04/28/22 1000   Activity/Group Checklist   Group   ([employer])   Attendance Attended   Attendance Duration (min) 46-60   Interactions Disorganized interaction   Affect/Mood Appropriate   Goals Achieved Identified feelings; Identified triggers; Discussed coping strategies; Able to listen to others; Able to engage in interactions; Able to reflect/comment on own behavior

## 2022-04-28 NOTE — NURSING NOTE
Pt received PRN Ativan for a patel of 36  Pt became over stimulated on the unit  Pt started to yell and pace the halls  Pt stopped responding to verbal redirection so medication was offered  Will monitor for effectiveness

## 2022-04-28 NOTE — NURSING NOTE
Pt was visible in the milieu  Pt had 1:1 discontinued  Pt had a single verbal outburst after the milieu became loud and overstimulating  Pt was medicated with PRN Ativan which was effective  Pt is responding to verbal redirection and going to his room when upset  Pt denies depression, SI/HI/AVH  Pt said the "chatter" is gone  Q 7 minute checks were maintained

## 2022-04-28 NOTE — NURSING NOTE
Presents with mostly flat affect,yet on occasion he is labile with pressured speech albeit much less frequent each passing day  He rates his depression and anxiety as low to moderate,controlled,and related to inpatient status  He agrees to inform staff of any thoughts of self harm,AH,VH,or HIs  He attends groups with active participation:visible on unit,somewhat restless,no behaviors thus far that would warrant 1:1 status as of yet  He has been re directable when peer began "yelling" at him, and another peer  In addition to documented education we discussed coping skills and provided him with positive affirmations in regards to his improved status  Will continue to educate,monitor,and provide safe,therapeutic milieu

## 2022-04-28 NOTE — NURSING NOTE
Patient woke up stating very anxious,  Patient stating missing family and cant sleep   Patient also stated he buried the devil  Patient medicated [phone] with Thorazine 75mg po  Prior Trazodone 100mng PO was administered 2105 was ineffective  Will continue to monitor and provide support  Q 7 minute safety and behavioral checks maintained

## 2022-04-28 NOTE — PROGRESS NOTES
Progress Note - Behavioral Health   Rae Post 31 y o  male MRN: [id number]  Unit/Bed#: U 224-01 Encounter: [id number]    Assessment/Plan   Principal Problem:    Schizoaffective disorder, bipolar type ([address] )      Behavior over the last 24 hours:  unchanged  Sleep:  Intermittent  Appetite: normal  Medication side effects: No  ROS: no complaints and all other systems are negative    Chanel Elmore was seen today for psychiatric follow-up  He has been doing well off of 1:1 observation and responding to verbal redirection  Exhibits mild agitation and insists he wants to go home to be with his family and play video games  He reports feeling angry and dead inside" while pointing towards his chest   Endorses intermittent sleep stating, I can't sleep in an insane asylum!" Continues to verbalize ongoing "mumbling" in his head  Mood is labile  Remains hopeless and paranoid about others being after him with guns  Verbalized he becomes increasingly agitated and anxious when he years, those people yelling in the hallway    According to nursing staff, patient woke up last evening saying that he buried the devil  Despite this, Chanel Elmore has been compliant with medications and meals      Mental Status Evaluation:  Appearance:  age appropriate and casually dressed   Behavior:  Bizarre, pacing, redirectable   Speech:  delayed and Loud at times   Mood:  anxious, irritable and labile   Affect:  labile   Thought Process:  circumstantial and illogical   Thought Content:  Paranoid   Perceptual Disturbances: AH of ongoing "mumbling," non commanding   Risk Potential: Suicidal Ideations none  Homicidal Ideations none  Potential for Aggression Yes due to paranoia, mood lability, and history of agitation/aggression toward others   Sensorium:  person, place and situation   Memory:  recent and remote memory: unable to assess due to lack of cooperation   Consciousness:  alert and awake    Attention: attention span appeared shorter than expected for age   Insight:  impaired due to Psychosis   Judgment: impaired due to Psychosis   Gait/Station: normal gait/station and normal balance   Motor Activity: no abnormal movements     Progress Toward Goals:  Slight improvement  Able to contract for safety, one-to-one discontinued  Responds well to verbal redirection  Remains thought disordered, labile, and paranoid  Will increase lithium 600mg PO QHS for mood lability and repeat lithium level 5/3/22  Continue remainder psychotropic regimen as ordered  Will return home upon stabilization  No discharge date at this time  Recommended Treatment: Continue with group therapy, milieu therapy and occupational therapy  Risks, benefits and possible side effects of Medications:   Eli Villaseñor has limited understanding of risks versus benefits of medications, but agrees to take as prescribed      Medications:   Increase lithium 600 mg PO QHS  Repeat lithium level 05/03/2022  all current active meds have been reviewed and current meds:   Current Facility-Administered Medications   Medication Dose Route Frequency    acetaminophen (TYLENOL) tablet 650 mg  650 mg Oral Q6H PRN    acetaminophen (TYLENOL) tablet 650 mg  650 mg Oral Q4H PRN    acetaminophen (TYLENOL) tablet 975 mg  975 mg Oral Q6H PRN    aluminum-magnesium hydroxide-simethicone (MYLANTA) oral suspension 30 mL  30 mL Oral Q4H PRN    haloperidol lactate (HALDOL) injection 2 5 mg  2 5 mg Intramuscular Q6H PRN Max 4/day    And    LORazepam (ATIVAN) injection 1 mg  1 mg Intramuscular Q6H PRN Max 4/day    And    benztropine (COGENTIN) injection 0 5 mg  0 5 mg Intramuscular Q6H PRN Max 4/day    LORazepam (ATIVAN) injection 2 mg  2 mg Intramuscular Q4H PRN Max 4/day    And    benztropine (COGENTIN) injection 1 mg  1 mg Intramuscular Q4H PRN Max 4/day    benztropine (COGENTIN) tablet 1 mg  1 mg Oral Q6H PRN    chlorproMAZINE (THORAZINE) injection SOLN 50 mg  50 mg Intramuscular Q6H PRN    chlorproMAZINE (THORAZINE) tablet 75 mg  75 mg Oral Q6H PRN    [START ON 7/26/2022] cholecalciferol (VITAMIN D3) tablet 1,000 Units  1,000 Units Oral Daily    clonazePAM (KlonoPIN) tablet 1 mg  1 mg Oral BID    cyanocobalamin (VITAMIN B-12) tablet 1,000 mcg  1,000 mcg Oral Daily    divalproex sodium (DEPAKOTE) EC tablet 1,000 mg  1,000 mg Oral HS    ergocalciferol (VITAMIN D2) capsule 50,000 Units  50,000 Units Oral Weekly    hydrOXYzine HCL (ATARAX) tablet 25 mg  25 mg Oral Q6H PRN Max 4/day    hydrOXYzine HCL (ATARAX) tablet 50 mg  50 mg Oral Q4H PRN Max 4/day    Or    LORazepam (ATIVAN) injection 1 mg  1 mg Intramuscular Q4H PRN    [START ON 4/29/2022] lithium carbonate capsule 450 mg  450 mg Oral Daily    lithium carbonate capsule 600 mg  600 mg Oral HS    LORazepam (ATIVAN) tablet 1 mg  1 mg Oral Q6H PRN    Or    LORazepam (ATIVAN) injection 2 mg  2 mg Intramuscular Q6H PRN Max 3/day    metoprolol tartrate (LOPRESSOR) tablet 25 mg  25 mg Oral Q12H [address]    nicotine (NICODERM CQ) 7 mg/24hr TD 24 hr patch 1 patch  1 patch Transdermal Daily    nystatin (MYCOSTATIN) oral suspension 500,000 Units  500,000 Units Swish & Swallow 4x Daily    OLANZapine (ZyPREXA ZYDIS) dispersible tablet 10 mg  10 mg Oral Q3H PRN    OLANZapine (ZyPREXA ZYDIS) dispersible tablet 5 mg  5 mg Oral Q3H PRN    OLANZapine (ZyPREXA) IM injection 10 mg  10 mg Intramuscular Q2H PRN    OLANZapine (ZyPREXA) tablet 10 mg  10 mg Oral BID    polyethylene glycol (MIRALAX) packet 17 g  17 g Oral Daily PRN    risperiDONE (RisperDAL M-TAB) disintegrating tablet 4 mg  4 mg Oral BID    traZODone (DESYREL) tablet 100 mg  100 mg Oral HS PRN     Labs: I have personally reviewed all pertinent laboratory/tests results     CMP:   Lab Results   Component Value Date    SODIUM 140 04/23/2022    K 3 6 04/23/2022     04/23/2022    CO2 30 04/23/2022    AGAP 8 04/23/2022    BUN 9 04/23/2022    CREATININE 1 04 04/23/2022    GLUC 83 04/23/2022    GLUF 83 04/23/2022    CALCIUM 9 9 04/23/2022    AST 39 04/23/2022    ALT 30 04/23/2022    ALKPHOS 58 04/23/2022    TP 7 2 04/23/2022    ALB 4 7 04/23/2022    TBILI 0 88 04/23/2022    EGFR 97 04/23/2022     Depakote:   Lab Results   Component Value Date    VALPROICTOT 108 (H) 04/26/2022     Lithium:   Lab Results   Component Value Date    LITHIUM 0 7 04/26/2022     Counseling / Coordination of Care  Total floor / unit time spent today 25 minutes  Greater than 50% of total time was spent with the patient and / or family counseling and / or coordination of care   A description of the counseling / coordination of care:  Medication education, treatment plan, supportive therapy

## 2022-04-29 PROCEDURE — 99232 SBSQ HOSP IP/OBS MODERATE 35: CPT | Performed by: NURSE PRACTITIONER

## 2022-04-29 RX ORDER — OLANZAPINE 15 MG/1
15 TABLET ORAL 2 TIMES DAILY
Status: DISCONTINUED | OUTPATIENT
Start: 2022-04-29 | End: 2022-05-03

## 2022-04-29 RX ADMIN — LORAZEPAM 1 MG: 1 TABLET ORAL at 16:24

## 2022-04-29 RX ADMIN — METOPROLOL TARTRATE 25 MG: 25 TABLET ORAL at 21:54

## 2022-04-29 RX ADMIN — METOPROLOL TARTRATE 25 MG: 25 TABLET ORAL at 08:10

## 2022-04-29 RX ADMIN — OLANZAPINE 15 MG: 15 TABLET, FILM COATED ORAL at 17:01

## 2022-04-29 RX ADMIN — RISPERIDONE 4 MG: 2 TABLET, ORALLY DISINTEGRATING ORAL at 08:10

## 2022-04-29 RX ADMIN — DIVALPROEX SODIUM 1000 MG: 500 TABLET, DELAYED RELEASE ORAL at 21:54

## 2022-04-29 RX ADMIN — LITHIUM CARBONATE 600 MG: 300 CAPSULE, GELATIN COATED ORAL at 21:54

## 2022-04-29 RX ADMIN — CLONAZEPAM 1 MG: 1 TABLET ORAL at 17:00

## 2022-04-29 RX ADMIN — HYDROXYZINE HYDROCHLORIDE 50 MG: 50 TABLET, FILM COATED ORAL at 03:05

## 2022-04-29 RX ADMIN — NICOTINE POLACRILEX 2 MG: 2 GUM, CHEWING BUCCAL at 18:08

## 2022-04-29 RX ADMIN — NICOTINE 7 MG/24 HR DAILY TRANSDERMAL PATCH 1 PATCH: at 08:15

## 2022-04-29 RX ADMIN — RISPERIDONE 4 MG: 2 TABLET, ORALLY DISINTEGRATING ORAL at 17:00

## 2022-04-29 RX ADMIN — CLONAZEPAM 1 MG: 1 TABLET ORAL at 08:10

## 2022-04-29 RX ADMIN — NYSTATIN 500000 UNITS: 100000 SUSPENSION ORAL at 08:16

## 2022-04-29 RX ADMIN — OLANZAPINE 10 MG: 10 TABLET, FILM COATED ORAL at 08:10

## 2022-04-29 RX ADMIN — TRAZODONE HYDROCHLORIDE 100 MG: 50 TABLET ORAL at 22:34

## 2022-04-29 RX ADMIN — LITHIUM CARBONATE 450 MG: 300 CAPSULE, GELATIN COATED ORAL at 08:10

## 2022-04-29 RX ADMIN — NYSTATIN 500000 UNITS: 100000 SUSPENSION ORAL at 17:00

## 2022-04-29 RX ADMIN — CYANOCOBALAMIN TAB 500 MCG 1000 MCG: 500 TAB at 08:10

## 2022-04-29 RX ADMIN — NYSTATIN 500000 UNITS: 100000 SUSPENSION ORAL at 21:53

## 2022-04-29 NOTE — PROGRESS NOTES
Progress Note - Roz Robison 31 y o  male MRN: [id number]    Unit/Bed#: [facility] 224-01 Encounter: [id number]        Subjective:   Patient seen and examined at bedside after reviewing the chart and discussing the case with the caring staff  Patient examined at bedside  Patient has no acute complaints  Physical Exam   Vitals: Blood pressure 112/73, pulse 90, temperature 98 7 °F (37 1 °C), temperature source Temporal, resp  rate 18, height 5' 6" (1 676 m), weight 74 3 kg (163 lb 12 8 oz), SpO2 100 %  ,Body mass index is 26 44 kg/m²  Constitutional:  Patient appears in no acute distress  HEENT: PERR, EOMI, MMM  Cardiovascular:  Regular rate, regular rhythm  Pulmonary/Chest: Effort normal and breath sounds normal    Abdomen: Soft, + BS, NT  Assessment/Plan:  Roz Robison is a(n) 31y o  year old male with schizoaffective disorder bipolar type      1  Tobacco abuse  Patient has been put on nicotine transdermal patch 7 mg daily  2  Arthritis/headache  Patient may get Tylenol on as needed basis  3  GERD  Patient may take Mylanta as needed  4  Insomnia  Patient may get trazodone as needed  5  Vitamin-D deficiency  Patient started on vitamin D2 04372 units weekly for 14 weeks followed by vitamin D3 1000 units daily  6  Vitamin B12 deficiency  Patient started on vitamin B12 supplement  7  Tachycardia  Patient started on metoprolol tartrate 25 mg twice daily on 04/23/2022  Continue to monitor closely  8  Oral candidiasis  Patient started on nystatin swish and swallow 4 times daily for 10 days  The patient was discussed with Dr Maddy Nath and he is in agreement with the above note

## 2022-04-29 NOTE — PLAN OF CARE

## 2022-04-29 NOTE — PROGRESS NOTES
04/29/22 1000   Activity/Group Checklist   Group   ([facility])   Attendance Attended   Attendance Duration (min) Greater than 60   Interactions Disorganized interaction   Affect/Mood Appropriate  (anxious at times)   Goals Achieved Identified feelings; Identified triggers; Discussed coping strategies; Identified resources and support systems; Able to listen to others; Able to engage in interactions; Able to reflect/comment on own behavior;Able to manage/cope with feelings

## 2022-04-29 NOTE — NURSING NOTE
[facility]  Patient does endorse depression and anxiety,low to high,and related to inpatient status  He denies SI,HI,AH,VH,and agrees to inform staff of any thoughts of SIs,HI,and or prior to any actions,stated understanding  He states his prn was effective and feels much better,no c/o SE that are unwanted  In addition to documented education we discussed criteria for DC; stated understanding but most likely requires reminders upon initiation of behaviors  Will continue to educate,monitor,and provide safe,therapeutic milieu

## 2022-04-29 NOTE — PROGRESS NOTES
04/29/22 0730   Activity/Group Checklist   Group   ([facility])   Attendance Attended   Attendance Duration (min) 46-60   Interactions Disorganized interaction   Affect/Mood Appropriate   Goals Achieved Identified feelings; Identified triggers; Discussed coping strategies; Able to listen to others; Able to engage in interactions

## 2022-04-29 NOTE — NURSING NOTE
Presents with severe anxiety as manifested by pressured,repetitive speech and demanding to be DC'd and or speak with the   I informed him the caseworkers see patients as they can and he will eventually be seen  he continued to "yell" non related statements  Offered and accepted prn ativan po for anxiety;educated on medication ,expected therapeutics and SE to report  Will monitor for aforementioned

## 2022-04-29 NOTE — PROGRESS NOTES
Progress Note - Behavioral Health   Sheri Aden 31 y o  male MRN: [id number]  Unit/Bed#: -01 Encounter: [id number]    Assessment/Plan   Principal Problem:    Schizoaffective disorder, bipolar type ([address] )      Behavior over the last 24 hours:  unchanged  Sleep:  Intermittent  Appetite: normal  Medication side effects: No  ROS: no complaints and all other systems are negative    Isiah Salmon was seen this morning for psychiatric follow-up  He remains anxious and distressed  Reports poor sleep and continues to endorse ongoing, "mumbling in his head  Continues to verbalize paranoia about the police and people being after him with guns  He then stated he is unable to sleep at night because of the antiChrist and "all the demons in the hallway"  Expresses his want for discharge and return home to his family and video games  Insight remains impaired due to level of psychosis  Overall, Isiah Salmon has been more redirectable and compliant with medications and meals  Doing well off of one-to-one observation  Mental Status Evaluation:  Appearance:  age appropriate, casually dressed and distressed   Behavior:  Restless, distracted   Speech:  delayed   Mood:  anxious, dysthymic and irritable   Affect:  Dysphoric, redirectable   Thought Process:  circumstantial and illogical   Thought Content:  Paranoid, religiously preoccupied   Perceptual Disturbances:  Auditory hallucinations of mumbling," non commanding   Appears internally preoccupied   Risk Potential: Suicidal Ideations none  Homicidal Ideations none  Potential for Aggression Yes due to paranoia and episodes of irritability, history of agitation/aggression   Sensorium:  person, place and situation   Memory:  recent and remote memory: unable to assess due to lack of cooperation   Consciousness:  alert and awake    Attention: attention span appeared shorter than expected for age   Insight:  impaired due to Psychosis   Judgment: impaired due to Psychosis   Gait/Station: normal gait/station and normal balance   Motor Activity: no abnormal movements     Progress Toward Goals:  Unchanged  Remains illogical, paranoid, and anxious  Sleep poor  Will increase Zyprexa 15 mg PO BID  Repeat lithium level due 05/03/2022  Continue with remainder of psychotropic regimen as ordered  No discharge date at this time  Recommended Treatment: Continue with group therapy, milieu therapy and occupational therapy  Risks, benefits and possible side effects of Medications:   Patient does not verbalize understanding at this time and will require further explanation        Medications:   Increase Zyprexa 15 mg PO BID   all current active meds have been reviewed and current meds:   Current Facility-Administered Medications   Medication Dose Route Frequency    acetaminophen (TYLENOL) tablet 650 mg  650 mg Oral Q6H PRN    acetaminophen (TYLENOL) tablet 650 mg  650 mg Oral Q4H PRN    acetaminophen (TYLENOL) tablet 975 mg  975 mg Oral Q6H PRN    aluminum-magnesium hydroxide-simethicone (MYLANTA) oral suspension 30 mL  30 mL Oral Q4H PRN    haloperidol lactate (HALDOL) injection 2 5 mg  2 5 mg Intramuscular Q6H PRN Max 4/day    And    LORazepam (ATIVAN) injection 1 mg  1 mg Intramuscular Q6H PRN Max 4/day    And    benztropine (COGENTIN) injection 0 5 mg  0 5 mg Intramuscular Q6H PRN Max 4/day    LORazepam (ATIVAN) injection 2 mg  2 mg Intramuscular Q4H PRN Max 4/day    And    benztropine (COGENTIN) injection 1 mg  1 mg Intramuscular Q4H PRN Max 4/day    benztropine (COGENTIN) tablet 1 mg  1 mg Oral Q6H PRN    chlorproMAZINE (THORAZINE) injection SOLN 50 mg  50 mg Intramuscular Q6H PRN    chlorproMAZINE (THORAZINE) tablet 75 mg  75 mg Oral Q6H PRN    [START ON 7/26/2022] cholecalciferol (VITAMIN D3) tablet 1,000 Units  1,000 Units Oral Daily    clonazePAM (KlonoPIN) tablet 1 mg  1 mg Oral BID    cyanocobalamin (VITAMIN B-12) tablet 1,000 mcg  1,000 mcg Oral Daily    divalproex sodium (DEPAKOTE) EC tablet 1,000 mg  1,000 mg Oral HS    ergocalciferol (VITAMIN D2) capsule 50,000 Units  50,000 Units Oral Weekly    hydrOXYzine HCL (ATARAX) tablet 25 mg  25 mg Oral Q6H PRN Max 4/day    hydrOXYzine HCL (ATARAX) tablet 50 mg  50 mg Oral Q4H PRN Max 4/day    Or    LORazepam (ATIVAN) injection 1 mg  1 mg Intramuscular Q4H PRN    lithium carbonate capsule 450 mg  450 mg Oral Daily    lithium carbonate capsule 600 mg  600 mg Oral HS    LORazepam (ATIVAN) tablet 1 mg  1 mg Oral Q6H PRN    Or    LORazepam (ATIVAN) injection 2 mg  2 mg Intramuscular Q6H PRN Max 3/day    metoprolol tartrate (LOPRESSOR) tablet 25 mg  25 mg Oral Q12H [address]    nicotine (NICODERM CQ) 7 mg/24hr TD 24 hr patch 1 patch  1 patch Transdermal Daily    nystatin (MYCOSTATIN) oral suspension 500,000 Units  500,000 Units Swish & Swallow 4x Daily    OLANZapine (ZyPREXA ZYDIS) dispersible tablet 10 mg  10 mg Oral Q3H PRN    OLANZapine (ZyPREXA ZYDIS) dispersible tablet 5 mg  5 mg Oral Q3H PRN    OLANZapine (ZyPREXA) IM injection 10 mg  10 mg Intramuscular Q2H PRN    OLANZapine (ZyPREXA) tablet 15 mg  15 mg Oral BID    polyethylene glycol (MIRALAX) packet 17 g  17 g Oral Daily PRN    risperiDONE (RisperDAL M-TAB) disintegrating tablet 4 mg  4 mg Oral BID    traZODone (DESYREL) tablet 100 mg  100 mg Oral HS PRN     Labs: I have personally reviewed all pertinent laboratory/tests results     CMP:   Lab Results   Component Value Date    SODIUM 140 04/23/2022    K 3 6 04/23/2022     04/23/2022    CO2 30 04/23/2022    AGAP 8 04/23/2022    BUN 9 04/23/2022    CREATININE 1 04 04/23/2022    GLUC 83 04/23/2022    GLUF 83 04/23/2022    CALCIUM 9 9 04/23/2022    AST 39 04/23/2022    ALT 30 04/23/2022    ALKPHOS 58 04/23/2022    TP 7 2 04/23/2022    ALB 4 7 04/23/2022    TBILI 0 88 04/23/2022    EGFR 97 04/23/2022     Depakote:   Lab Results   Component Value Date    VALPROICTOT 108 (H) 04/26/2022     Lithium:   Lab Results   Component Value Date    LITHIUM 0 7 04/26/2022     Counseling / Coordination of Care  Total floor / unit time spent today 30 minutes  Greater than 50% of total time was spent with the patient and / or family counseling and / or coordination of care   A description of the counseling / coordination of care:  Treatment plan, supportive therapy

## 2022-04-30 PROCEDURE — 99232 SBSQ HOSP IP/OBS MODERATE 35: CPT | Performed by: PSYCHIATRY & NEUROLOGY

## 2022-04-30 RX ADMIN — OLANZAPINE 15 MG: 15 TABLET, FILM COATED ORAL at 08:04

## 2022-04-30 RX ADMIN — NYSTATIN 500000 UNITS: 100000 SUSPENSION ORAL at 08:05

## 2022-04-30 RX ADMIN — RISPERIDONE 4 MG: 2 TABLET, ORALLY DISINTEGRATING ORAL at 17:25

## 2022-04-30 RX ADMIN — CYANOCOBALAMIN TAB 500 MCG 1000 MCG: 500 TAB at 08:03

## 2022-04-30 RX ADMIN — NYSTATIN 500000 UNITS: 100000 SUSPENSION ORAL at 17:25

## 2022-04-30 RX ADMIN — DIVALPROEX SODIUM 1000 MG: 500 TABLET, DELAYED RELEASE ORAL at 20:58

## 2022-04-30 RX ADMIN — TRAZODONE HYDROCHLORIDE 100 MG: 50 TABLET ORAL at 20:59

## 2022-04-30 RX ADMIN — CLONAZEPAM 1 MG: 1 TABLET ORAL at 08:03

## 2022-04-30 RX ADMIN — CHLORPROMAZINE HYDROCHLORIDE 75 MG: 25 TABLET, SUGAR COATED ORAL at 11:59

## 2022-04-30 RX ADMIN — LITHIUM CARBONATE 450 MG: 300 CAPSULE, GELATIN COATED ORAL at 08:03

## 2022-04-30 RX ADMIN — NYSTATIN 500000 UNITS: 100000 SUSPENSION ORAL at 13:29

## 2022-04-30 RX ADMIN — OLANZAPINE 15 MG: 15 TABLET, FILM COATED ORAL at 17:25

## 2022-04-30 RX ADMIN — NYSTATIN 500000 UNITS: 100000 SUSPENSION ORAL at 20:58

## 2022-04-30 RX ADMIN — LORAZEPAM 1 MG: 1 TABLET ORAL at 11:59

## 2022-04-30 RX ADMIN — HYDROXYZINE HYDROCHLORIDE 50 MG: 50 TABLET, FILM COATED ORAL at 20:59

## 2022-04-30 RX ADMIN — NICOTINE 7 MG/24 HR DAILY TRANSDERMAL PATCH 1 PATCH: at 08:06

## 2022-04-30 RX ADMIN — METOPROLOL TARTRATE 25 MG: 25 TABLET ORAL at 21:00

## 2022-04-30 RX ADMIN — CLONAZEPAM 1 MG: 1 TABLET ORAL at 17:25

## 2022-04-30 RX ADMIN — HYDROXYZINE HYDROCHLORIDE 50 MG: 50 TABLET, FILM COATED ORAL at 03:07

## 2022-04-30 RX ADMIN — LITHIUM CARBONATE 600 MG: 300 CAPSULE, GELATIN COATED ORAL at 21:00

## 2022-04-30 RX ADMIN — METOPROLOL TARTRATE 25 MG: 25 TABLET ORAL at 08:03

## 2022-04-30 RX ADMIN — RISPERIDONE 4 MG: 2 TABLET, ORALLY DISINTEGRATING ORAL at 08:03

## 2022-04-30 NOTE — PLAN OF CARE
Problem: Alteration in Thoughts and Perception  Goal: Treatment Goal: Gain control of psychotic behaviors/thinking, reduce/eliminate presenting symptoms and demonstrate improved reality functioning upon discharge  Outcome: Progressing  Goal: Verbalize thoughts and feelings  Description: Interventions:  - Promote a nonjudgmental and trusting relationship with the patient through active listening and therapeutic communication  - Assess patient's level of functioning, behavior and potential for risk  - Engage patient in 1 on 1 interactions  - Encourage patient to express fears, feelings, frustrations, and discuss symptoms    - [place] patient to reality, help patient recognize reality-based thinking   - Administer medications as ordered and assess for potential side effects  - Provide the patient education related to the signs and symptoms of the illness and desired effects of prescribed medications  Outcome: Progressing  Goal: Refrain from acting on delusional thinking/internal stimuli  Description: Interventions:  - Monitor patient closely, per order   - Utilize least restrictive measures   - Set reasonable limits, give positive feedback for acceptable   - Administer medications as ordered and monitor of potential side effects  Outcome: Progressing  Goal: Agree to be compliant with medication regime, as prescribed and report medication side effects  Description: Interventions:  - Offer appropriate PRN medication and supervise ingestion; conduct AIMS, as needed   Outcome: Progressing  Goal: Attend and participate in unit activities, including therapeutic, recreational, and educational groups  Description: Interventions:  -Encourage Visitation and family involvement in care  Outcome: Progressing  Goal: Recognize dysfunctional thoughts, communicate reality-based thoughts at the time of discharge  Description: Interventions:  - Provide medication and psycho-education to assist patient in compliance and developing insight into his/her illness   Outcome: Progressing  Goal: Complete daily ADLs, including personal hygiene independently, as able  Description: Interventions:  - Observe, teach, and assist patient with ADLS  - Monitor and promote a balance of rest/activity, with adequate nutrition and elimination   Outcome: Progressing

## 2022-04-30 NOTE — NURSING NOTE
Patient  Compliant with meds and meals  patient denies all cooperative and pleasant  Patient started to become increasingly anxious and agitated during the early afternoon and requested something to help so I gave the patient 75mg of thorazine and 1mg of ativan at 1159 which was effective  Patient visible on unit social with select peers and staff   Q 7 min behavioral and safety checks in place

## 2022-04-30 NOTE — PROGRESS NOTES
Progress Note - Behavioral Health   Jad Alvarez 31 y o  male MRN: [id number]  Unit/Bed#: U 224-01 Encounter: [id number]    Assessment/Plan   Principal Problem:    Schizoaffective disorder, bipolar type ([address] )       Continue to promote patient participation in therapeutic milieu   Continue medical management per medicine   Continue previously prescribed psychotropic medication regimen; see below   Continue behavioral health checks q 7 minutes   Continue vitals per behavioral health unit protocol   Discharge date per primary team     Subjective:  Patient evaluated this a   for continuity of care  Patient was discussed at length with nursing and treatment team  No acute distress is noted throughout evaluation  Jad Alvarez denies suicidal/homicidal ideation; contracts for safety  Patient remains predominantly calm, cooperative, although marginally participatory in the milieu, adherent to his medication regimen including p r n  Atarax and trazodone with therapeutic benefit in the absence of any acute adverse effects  Throughout evaluation, patient appears increasingly disorganized and illogical, inquiring about "Batman " He admits to sad/depressed and anxious/irritable mood, perseverating about his discharge disposition, receptive to psychoeducation provided by this writer, appearing internally preoccupied and distracted, admitting to auditory hallucinations, although is unwilling to elaborate  Patient admits to appropriate appetite states that he is eating well in addition to sleeping "better" overnight      Current Medications:  Current Facility-Administered Medications   Medication Dose Route Frequency Provider Last Rate    acetaminophen  650 mg Oral Q6H PRN Carla Lalito Medjeimy, BRIONNA      acetaminophen  650 mg Oral Q4H PRN Carla Lalito MedBRIONNA munson      acetaminophen  975 mg Oral Q6H PRN Carla Lalito MedBRIONNA munson      aluminum-magnesium hydroxide-simethicone  30 mL Oral Q4H PRN BRIONNA Collins      haloperidol lactate  2 5 mg Intramuscular Q6H PRN Max 4/day [facility] Medei, CRNP      And    LORazepam  1 mg Intramuscular Q6H PRN Max 4/day Bill Eddi Medei, CRNP      And    benztropine  0 5 mg Intramuscular Q6H PRN Max 4/day Bill Bristol Medei, CRNP      LORazepam  2 mg Intramuscular Q4H PRN Max 4/day Bill Bristol Medei, CRNP      And    benztropine  1 mg Intramuscular Q4H PRN Max 4/day Bill Eddi Medei, CRNP      benztropine  1 mg Oral Q6H PRN Bill Bristol Medei, CRNP      chlorproMAZINE  50 mg Intramuscular Q6H PRN Kam Vazquez MD      chlorproMAZINE  75 mg Oral Q6H PRN Kam Vazquez MD      [START ON 7/26/2022] cholecalciferol  1,000 Units Oral Daily Shanice Garcia PA-C      clonazePAM  1 mg Oral BID Ivanna KATELYN Ramirez, CRALFREDITO      cyanocobalamin  1,000 mcg Oral Daily Shanice Garcia PA-C      divalproex sodium  1,000 mg Oral HS Kam Vazquez MD      ergocalciferol  50,000 Units Oral Weekly Shanice Garcia PA-C      hydrOXYzine HCL  25 mg Oral Q6H PRN Max 4/day Bill Bristol Medei, CRNP      hydrOXYzine HCL  50 mg Oral Q4H PRN Max 4/day Bill Bristol Medei, CRNP      Or    LORazepam  1 mg Intramuscular Q4H PRN Bill Bristol Medei, CRNP      lithium carbonate  450 mg Oral Daily Bill Bristol Medei, CRNP      lithium carbonate  600 mg Oral HS Bill Eddi Medei, CRNP      LORazepam  1 mg Oral Q6H PRN Bill Eddi Medei, CRNP      Or    LORazepam  2 mg Intramuscular Q6H PRN Max 3/day Bill Eddi Medei, CRNP      metoprolol tartrate  25 mg Oral Q12H [facility] Shanice Garcia PA-C      nicotine  1 patch Transdermal Daily Bill Bristol Medei, CRNP      nicotine polacrilex  2 mg Oral Q2H PRN Shanice Garcia PA-C      nystatin  500,000 Units Swish & Swallow 4x Daily Shanice Garcia PA-C      OLANZapine  10 mg Oral Q3H PRN Kam Vazquez MD      OLANZapine  5 mg Oral Q3H PRN Kam Vazquez MD      OLANZapine  10 mg Intramuscular Q2H PRN Connie Son Shane garsia MD      OLANZapine  15 mg Oral BID Liu Cancino MD      polyethylene glycol  17 g Oral Daily PRN Desa BRIONNA Hammer      risperiDONE  4 mg Oral BID BRIONNA Riojas      traZODone  100 mg Oral HS PRN BRIONNA Huber         Behavioral Health Medications:   all current active meds have been reviewed, continue current psychiatric medications and current meds:   Current Facility-Administered Medications   Medication Dose Route Frequency    acetaminophen (TYLENOL) tablet 650 mg  650 mg Oral Q6H PRN    acetaminophen (TYLENOL) tablet 650 mg  650 mg Oral Q4H PRN    acetaminophen (TYLENOL) tablet 975 mg  975 mg Oral Q6H PRN    aluminum-magnesium hydroxide-simethicone (MYLANTA) oral suspension 30 mL  30 mL Oral Q4H PRN    haloperidol lactate (HALDOL) injection 2 5 mg  2 5 mg Intramuscular Q6H PRN Max 4/day    And    LORazepam (ATIVAN) injection 1 mg  1 mg Intramuscular Q6H PRN Max 4/day    And    benztropine (COGENTIN) injection 0 5 mg  0 5 mg Intramuscular Q6H PRN Max 4/day    LORazepam (ATIVAN) injection 2 mg  2 mg Intramuscular Q4H PRN Max 4/day    And    benztropine (COGENTIN) injection 1 mg  1 mg Intramuscular Q4H PRN Max 4/day    benztropine (COGENTIN) tablet 1 mg  1 mg Oral Q6H PRN    chlorproMAZINE (THORAZINE) injection SOLN 50 mg  50 mg Intramuscular Q6H PRN    chlorproMAZINE (THORAZINE) tablet 75 mg  75 mg Oral Q6H PRN    [START ON 7/26/2022] cholecalciferol (VITAMIN D3) tablet 1,000 Units  1,000 Units Oral Daily    clonazePAM (KlonoPIN) tablet 1 mg  1 mg Oral BID    cyanocobalamin (VITAMIN B-12) tablet 1,000 mcg  1,000 mcg Oral Daily    divalproex sodium (DEPAKOTE) EC tablet 1,000 mg  1,000 mg Oral HS    ergocalciferol (VITAMIN D2) capsule 50,000 Units  50,000 Units Oral Weekly    hydrOXYzine HCL (ATARAX) tablet 25 mg  25 mg Oral Q6H PRN Max 4/day    hydrOXYzine HCL (ATARAX) tablet 50 mg  50 mg Oral Q4H PRN Max 4/day    Or    LORazepam (ATIVAN) injection 1 mg  1 mg Intramuscular Q4H PRN    lithium carbonate capsule 450 mg  450 mg Oral Daily    lithium carbonate capsule 600 mg  600 mg Oral HS    LORazepam (ATIVAN) tablet 1 mg  1 mg Oral Q6H PRN    Or    LORazepam (ATIVAN) injection 2 mg  2 mg Intramuscular Q6H PRN Max 3/day    metoprolol tartrate (LOPRESSOR) tablet 25 mg  25 mg Oral Q12H [address]    nicotine (NICODERM CQ) 7 mg/24hr TD 24 hr patch 1 patch  1 patch Transdermal Daily    nicotine polacrilex (NICORETTE) gum 2 mg  2 mg Oral Q2H PRN    nystatin (MYCOSTATIN) oral suspension 500,000 Units  500,000 Units Swish & Swallow 4x Daily    OLANZapine (ZyPREXA ZYDIS) dispersible tablet 10 mg  10 mg Oral Q3H PRN    OLANZapine (ZyPREXA ZYDIS) dispersible tablet 5 mg  5 mg Oral Q3H PRN    OLANZapine (ZyPREXA) IM injection 10 mg  10 mg Intramuscular Q2H PRN    OLANZapine (ZyPREXA) tablet 15 mg  15 mg Oral BID    polyethylene glycol (MIRALAX) packet 17 g  17 g Oral Daily PRN    risperiDONE (RisperDAL M-TAB) disintegrating tablet 4 mg  4 mg Oral BID    traZODone (DESYREL) tablet 100 mg  100 mg Oral HS PRN     Vital signs in last 24 hours:  Temp:  [97 2 °F (36 2 °C)-97 9 °F (36 6 °C)] 97 9 °F (36 6 °C)  HR:  [86-96] 96  Resp:  [16-18] 18  BP: (121-130)/(79-86) 130/82    Laboratory results:    I have personally reviewed all pertinent laboratory/tests results    Most Recent Labs:   Lab Results   Component Value Date    WBC 6 26 04/23/2022    RBC 5 36 04/23/2022    HGB 16 8 04/23/2022    HCT 51 5 (H) 04/23/2022     04/23/2022    RDW 11 8 04/23/2022    NEUTROABS 4 06 04/23/2022    SODIUM 140 04/23/2022    K 3 6 04/23/2022     04/23/2022    CO2 30 04/23/2022    BUN 9 04/23/2022    CREATININE 1 04 04/23/2022    GLUC 83 04/23/2022    GLUF 83 04/23/2022    CALCIUM 9 9 04/23/2022    AST 39 04/23/2022    ALT 30 04/23/2022    ALKPHOS 58 04/23/2022    TP 7 2 04/23/2022    ALB 4 7 04/23/2022    TBILI 0 88 04/23/2022    CHOLESTEROL 113 04/23/2022    HDL 42 04/23/2022    TRIG 97 04/23/2022    LDLCALC 52 04/23/2022    NONHDLC 71 04/23/2022    VALPROICTOT 108 (H) 04/26/2022    LITHIUM 0 7 04/26/2022    [id number] 1 193 04/23/2022    HGBA1C 5 2 12/17/2020     12/17/2020     Labs in last 72 hours: No results for input(s): WBC, RBC, HGB, HCT, PLT, RDW, NEUTROABS, SODIUM, K, CL, CO2, BUN, CREATININE, GLUC, GLUF, CALCIUM, AST, ALT, ALKPHOS, TP, ALB, TBILI, CHOLESTEROL, HDL, TRIG, LDLCALC, VALPROICTOT, CARBAMAZEPIN, LITHIUM, AMMONIA, [id number], FREET4, T3FREE, PREGTESTUR, PREGSERUM, HCG, HCGQUANT, RPR in the last 72 hours      Invalid input(s):  RBC  Admission Labs:   Admission on 04/18/2022   Component Date Value    WBC 04/23/2022 6 26     RBC 04/23/2022 5 36     Hemoglobin 04/23/2022 16 8     Hematocrit 04/23/2022 51 5*    MCV 04/23/2022 96     MCH 04/23/2022 31 3     MCHC 04/23/2022 32 6     RDW 04/23/2022 11 8     MPV 04/23/2022 9 5     Platelets 93/22/0011 261     nRBC 04/23/2022 0     Neutrophils Relative 04/23/2022 65     Immat GRANS % 04/23/2022 0     Lymphocytes Relative 04/23/2022 23     Monocytes Relative 04/23/2022 9     Eosinophils Relative 04/23/2022 3     Basophils Relative 04/23/2022 0     Neutrophils Absolute 04/23/2022 4 06     Immature Grans Absolute 04/23/2022 0 02     Lymphocytes Absolute 04/23/2022 1 41     Monocytes Absolute 04/23/2022 0 57     Eosinophils Absolute 04/23/2022 0 18     Basophils Absolute 04/23/2022 0 02     Sodium 04/23/2022 140     Potassium 04/23/2022 3 6     Chloride 04/23/2022 102     CO2 04/23/2022 30     ANION GAP 04/23/2022 8     BUN 04/23/2022 9     Creatinine 04/23/2022 1 04     Glucose 04/23/2022 83     Glucose, Fasting 04/23/2022 83     Calcium 04/23/2022 9 9     AST 04/23/2022 39     ALT 04/23/2022 30     Alkaline Phosphatase 04/23/2022 58     Total Protein 04/23/2022 7 2     Albumin 04/23/2022 4 7     Total Bilirubin 04/23/2022 0 88     eGFR 04/23/2022 97     Cholesterol 04/23/2022 113     Triglycerides 04/23/2022 97     HDL, Direct 04/23/2022 42     LDL Calculated 04/23/2022 52     Non-HDL-Chol (CHOL-HDL) 04/23/2022 71     TSH 3RD GENERATON 04/23/2022 1  193     Vitamin B-12 04/23/2022 241     Vit D, 25-Hydroxy 04/23/2022 13 6*    Folate 04/23/2022 6 5     Lithium Lvl 04/26/2022 0 7     Valproic Acid, Total 04/26/2022 108*    Ventricular Rate 04/23/2022 130     Atrial Rate 04/23/2022 130     ME Interval 04/23/2022 130     QRSD Interval 04/23/2022 72     QT Interval 04/23/2022 324     QTC Interval 04/23/2022 476     P Axis 04/23/2022 67     QRS Axis 04/23/2022 102     T Wave Axis 04/23/2022 10     Ventricular Rate 04/23/2022 132     Atrial Rate 04/23/2022 132     ME Interval 04/23/2022 112     QRSD Interval 04/23/2022 72     QT Interval 04/23/2022 320     QTC Interval 04/23/2022 474     P Axis 04/23/2022 67     QRS Axis 04/23/2022 102     T Wave Axis 04/23/2022 -4     Ventricular Rate 04/23/2022 126     Atrial Rate 04/23/2022 126     ME Interval 04/23/2022 138     QRSD Interval 04/23/2022 66     QT Interval 04/23/2022 296     QTC Interval 04/23/2022 428     P Axis 04/23/2022 60     QRS Axis 04/23/2022 82     T Wave Axis 04/23/2022 26        Psychiatric Review of Systems:  Behavior over the last 24 hours:  unchanged  Sleep: normal  Appetite: normal  Medication side effects: No  Review of systems: no complaints    Mental Status Evaluation:  Appearance:  age appropriate, casually dressed and disheveled   Behavior:  psychomotor agitation and restless and fidgety   Speech:  increased latency of response and loud   Mood:  anxious, depressed and dysthymic   Affect:  labile   Language naming objects and repeating phrases   Thought Process:  disorganized and logical   Thought Content:  delusions  persecutory/paranoid   Perceptual Disturbances: Auditory hallucinations without commands  Appears internally preoccupied     Risk Potential: Suicidal Ideations none, Homicidal Ideations none and Potential for Aggression No   Sensorium:  person, place and situation   Cognition:  recent and remote memory: unable to assess due to lack of cooperation   Consciousness:  alert and awake    Attention: attention span appeared shorter than expected for age   Insight:  limited   Judgment: limited   Intellect    Gait/Station: normal gait/station and normal balance   Motor Activity: no abnormal movements     Memory: Short and long term memory  unable to assess     Progress Toward Goals:  Unchanged, as evidenced by their participation (or lack thereof) in individual, social and therapeutic milieu in addition to adherence to their medication regimen  Recommended Treatment:   See above for assessment and plan  Inpatient Psychiatric Certification: I certify that inpatient services are medically necessary for this patient for a duration of greater that 2 midnights for the treatment of Schizoaffective disorder, bipolar type ([facility] [address] )   See H&P and Progress Notes for additional information about the patient's course of treatment       Continue following current medications:   Current Facility-Administered Medications   Medication Dose Route Frequency Provider Last Rate    acetaminophen  650 mg Oral Q6H PRN Marlee Janus Medei, CRNP      acetaminophen  650 mg Oral Q4H PRN Marlee Janus Medei, CRNP      acetaminophen  975 mg Oral Q6H PRN Marlee Janus Medei, CRNP      aluminum-magnesium hydroxide-simethicone  30 mL Oral Q4H PRN Marlee Janus Medei, CRNP      haloperidol lactate  2 5 mg Intramuscular Q6H PRN Max 4/day Marlee Janus Medei, CRNP      And    LORazepam  1 mg Intramuscular Q6H PRN Max 4/day Marlee Janus Medei, CRNP      And    benztropine  0 5 mg Intramuscular Q6H PRN Max 4/day Marlee Janus Medei, CRNP      LORazepam  2 mg Intramuscular Q4H PRN Max 4/day Marlee Janus Medei, CRNP      And    benztropine  1 mg Intramuscular Q4H PRN Max 4/day Marlee Janus Medei, CRNP      benztropine  1 mg Oral Q6H PRN BRIONNA Yu      chlorproMAZINE  50 mg Intramuscular Q6H PRN Alberto Matute MD      chlorproMAZINE  75 mg Oral Q6H PRN Alberto Matute MD      [START ON 7/26/2022] cholecalciferol  1,000 Units Oral Daily Shanice Garcia PA-C      clonazePAM  1 mg Oral BID Ivanna M Medei, CRALFREDITO      cyanocobalamin  1,000 mcg Oral Daily Shanice Garcia PA-C      divalproex sodium  1,000 mg Oral HS Alberto Matute MD      ergocalciferol  50,000 Units Oral Weekly Shanice Garcia PA-C      hydrOXYzine HCL  25 mg Oral Q6H PRN Max 4/day Ivanna M Medei, CRNP      hydrOXYzine HCL  50 mg Oral Q4H PRN Max 4/day Lauren Mackintosh Medei, CRNP      Or    LORazepam  1 mg Intramuscular Q4H PRN Lauren Mackintosh Medei, CRNP      lithium carbonate  450 mg Oral Daily [place] Mackintosh Medei, CRNP      lithium carbonate  600 mg Oral HS [place] Mackintosh Medei, CRNP      LORazepam  1 mg Oral Q6H PRN Lauren Mackintosh Medei, CRNP      Or    LORazepam  2 mg Intramuscular Q6H PRN Max 3/day [place] Mackintosh Medei, CRNP      metoprolol tartrate  25 mg Oral Q12H [address] Shanice Garcia PA-C      nicotine  1 patch Transdermal Daily Ivanna M Medei, CRNP      nicotine polacrilex  2 mg Oral Q2H PRN Shanice Garcia PA-C      nystatin  500,000 Units Swish & Swallow 4x Daily Shanice Garcia PA-C      OLANZapine  10 mg Oral Q3H PRN Alberto Mtaute MD      OLANZapine  5 mg Oral Q3H PRN Alberto Matute MD      OLANZapine  10 mg Intramuscular Q2H PRN Alberto Matute MD      OLANZapine  15 mg Oral BID Susanne Calix MD      polyethylene glycol  17 g Oral Daily PRN [place] Mackintosh Medei, CRNP      risperiDONE  4 mg Oral BID Lauren Mackintosh Medei, CRNP      traZODone  100 mg Oral HS PRN Lauren Mackintosh Medei, CRNP         Risks, benefits and possible side effects of Medications:   Patient does not verbalize understanding at this time and will require further explanation        This note was not shared with the patient due to reasonable likelihood of causing patient harm     This note has been constructed using a voice recognition system  There may be translation, syntax,  or grammatical errors  If you have any questions, please contact the dictating provider      Gambia C Holter, DO

## 2022-04-30 NOTE — NURSING NOTE
Trazodone 100 mg given at 2234 for sleep with positive effects  Patient sleeping with no distress noted

## 2022-05-01 PROCEDURE — 99232 SBSQ HOSP IP/OBS MODERATE 35: CPT | Performed by: PSYCHIATRY & NEUROLOGY

## 2022-05-01 RX ADMIN — ALUMINUM HYDROXIDE, MAGNESIUM HYDROXIDE, AND SIMETHICONE 30 ML: 200; 200; 20 SUSPENSION ORAL at 19:35

## 2022-05-01 RX ADMIN — LITHIUM CARBONATE 450 MG: 300 CAPSULE, GELATIN COATED ORAL at 08:04

## 2022-05-01 RX ADMIN — DIVALPROEX SODIUM 1000 MG: 500 TABLET, DELAYED RELEASE ORAL at 21:16

## 2022-05-01 RX ADMIN — LORAZEPAM 1 MG: 1 TABLET ORAL at 13:14

## 2022-05-01 RX ADMIN — OLANZAPINE 15 MG: 15 TABLET, FILM COATED ORAL at 17:10

## 2022-05-01 RX ADMIN — OLANZAPINE 15 MG: 15 TABLET, FILM COATED ORAL at 08:05

## 2022-05-01 RX ADMIN — RISPERIDONE 4 MG: 2 TABLET, ORALLY DISINTEGRATING ORAL at 08:05

## 2022-05-01 RX ADMIN — METOPROLOL TARTRATE 25 MG: 25 TABLET ORAL at 08:05

## 2022-05-01 RX ADMIN — CLONAZEPAM 1 MG: 1 TABLET ORAL at 17:10

## 2022-05-01 RX ADMIN — CHLORPROMAZINE HYDROCHLORIDE 75 MG: 25 TABLET, SUGAR COATED ORAL at 13:14

## 2022-05-01 RX ADMIN — CLONAZEPAM 1 MG: 1 TABLET ORAL at 08:05

## 2022-05-01 RX ADMIN — METOPROLOL TARTRATE 25 MG: 25 TABLET ORAL at 20:38

## 2022-05-01 RX ADMIN — LITHIUM CARBONATE 600 MG: 300 CAPSULE, GELATIN COATED ORAL at 20:38

## 2022-05-01 RX ADMIN — RISPERIDONE 4 MG: 2 TABLET, ORALLY DISINTEGRATING ORAL at 17:10

## 2022-05-01 RX ADMIN — TRAZODONE HYDROCHLORIDE 100 MG: 50 TABLET ORAL at 20:39

## 2022-05-01 RX ADMIN — NYSTATIN 500000 UNITS: 100000 SUSPENSION ORAL at 21:15

## 2022-05-01 RX ADMIN — HYDROXYZINE HYDROCHLORIDE 25 MG: 25 TABLET ORAL at 02:53

## 2022-05-01 RX ADMIN — CYANOCOBALAMIN TAB 500 MCG 1000 MCG: 500 TAB at 08:05

## 2022-05-01 NOTE — PROGRESS NOTES
Progress Note - Derik Toth 31 y o  male MRN: [id number]    Unit/Bed#: [facility] 224-01 Encounter: [id number]      Assessment:  1  Tobacco abuse  Nicotine Patch  2  Arthritis/headache  PRN Tylenol  3  GERD  PRN Mylanta  4  Insomnia  PRN Trazodone  5  Vitamin-D deficiency  Patient started on vitamin D2 00596 units weekly for 14 weeks followed by vitamin D3 1000 units daily  6  Vitamin B12 deficiency  Supplemented  7  Tachycardia  Metoprolol Tartrate BID  8  Oral candidiasis  Nystatin S/S       Plan:  See above    Subjective:   No/c/o    Objective:     Vitals: Blood pressure 113/78, pulse (!) 118, temperature 98 2 °F (36 8 °C), temperature source Temporal, resp  rate 16, height 5' 6" (1 676 m), weight 77 1 kg (170 lb), SpO2 100 %  ,Body mass index is 27 44 kg/m²      No intake or output data in the 24 hours ending 05/01/22 1854    Physical Exam: /78 (BP Location: Right arm)   Pulse (!) 118   Temp 98 2 °F (36 8 °C) (Temporal)   Resp 16   Ht 5' 6" (1 676 m)   Wt 77 1 kg (170 lb)   SpO2 100%   BMI 27 44 kg/m²     General Appearance:    Alert, cooperative, no distress, appears stated age   Head:    Normocephalic, without obvious abnormality, atraumatic                   Neck:   Supple, symmetrical, trachea midline, no adenopathy;        thyroid:  No enlargement/tenderness/nodules; no carotid    bruit or JVD   Back:     Symmetric, no curvature, ROM normal, no CVA tenderness   Lungs:     Clear to auscultation bilaterally, respirations unlabored   Chest wall:    No tenderness or deformity   Heart:    Regular rate and rhythm, S1 and S2 normal, no murmur, rub   or gallop   Abdomen:     Soft, non-tender, bowel sounds active all four quadrants,     no masses, no organomegaly           Extremities:   Extremities normal, atraumatic, no cyanosis or edema   Pulses:   2+ and symmetric all extremities   Skin:   Skin color, texture, turgor normal, no rashes or lesions   Lymph nodes:   Cervical, supraclavicular, and axillary nodes normal            Invasive Devices  Report    None                 Lab, Imaging and other studies: I have personally reviewed pertinent reports

## 2022-05-01 NOTE — NURSING NOTE
Patient out at [phone] stating he can't fall back to sleep and requesting prn medication  Patient with eyes half closed  Encouraged patient to try to lie back down and see if he is able to sleep on his own  Patient adamant that he needs medication  Informed patient he could have a low dose of atarax (25mg)  Patient agreeable    Patient did fall back to sleep shortly after lying down

## 2022-05-01 NOTE — PROGRESS NOTES
Progress Note - Behavioral Health   Janes Martinez 31 y o  male MRN: [id number]  Unit/Bed#: U 224-01 Encounter: [id number]    Assessment/Plan   Principal Problem:    Schizoaffective disorder, bipolar type ([facility] [address] )       Continue to promote patient participation in therapeutic milieu   Continue medical management per medicine   Continue previously prescribed psychotropic medication regimen; see below   Continue behavioral health checks q 7 minutes   Continue vitals per behavioral health unit protocol   Discharge date per primary team     Subjective:  Patient evaluated this a   for continuity of care  Patient was discussed at length with nursing and treatment team  No acute distress is noted throughout evaluation  Janes Martinez denies suicidal/homicidal ideation; contracts for safety  Per nursing, patient remains anxious/irritable and agitated necessitating use of p r n  Atarax with therapeutic benefit, citing disruptions in the milieu, selectively participatory in said milieu, adherent to his medication regimen in the absence of any acute adverse effects  Throughout evaluation, patient remains perseverative regarding his discharge disposition, receptive to psychoeducation by this writer, agreeing to discuss with his primary psychiatrist, denying sad/depressed mood, although admitting to anxiety relating to present admission in addition to his inability to "talk to his cousin", although patient was provided with a phone number to contact his cousin  He admits to appropriate appetite states that he is eating well, although admits to fragmented sleep overnight despite use of p r n  Atarax and Trazodone, appearing slightly somnolent      Current Medications:  Current Facility-Administered Medications   Medication Dose Route Frequency Provider Last Rate    acetaminophen  650 mg Oral Q6H PRN Pama Tj Medei, CRNP      acetaminophen  650 mg Oral Q4H PRN Pama [place] Medei, CRNP      acetaminophen  975 mg Oral Q6H PRN Jaya Gottlieb, BRIONNA      aluminum-magnesium hydroxide-simethicone  30 mL Oral Q4H PRN Peterson Danica Medei, CRNP      haloperidol lactate  2 5 mg Intramuscular Q6H PRN Max 4/day Peterson Danica Medei, CRNP      And    LORazepam  1 mg Intramuscular Q6H PRN Max 4/day Peterson Danica Medei, CRNP      And    benztropine  0 5 mg Intramuscular Q6H PRN Max 4/day Peterson Danica Medei, CRNP      LORazepam  2 mg Intramuscular Q4H PRN Max 4/day Peterson Danica Medei, CRNP      And    benztropine  1 mg Intramuscular Q4H PRN Max 4/day Peterson Danica Medei, CRNP      benztropine  1 mg Oral Q6H PRN Peterson Danica Medei, CRNP      chlorproMAZINE  50 mg Intramuscular Q6H PRN Garrett Mendenhall MD      chlorproMAZINE  75 mg Oral Q6H PRN Garrett Mendenhall MD      [START ON 7/26/2022] cholecalciferol  1,000 Units Oral Daily Shanice Garcia PA-C      clonazePAM  1 mg Oral BID Ivanna M Medei, CRALFREDITO      cyanocobalamin  1,000 mcg Oral Daily Shanice Garcia PA-C      divalproex sodium  1,000 mg Oral HS Garrett Mendenhall MD      ergocalciferol  50,000 Units Oral Weekly Shanice Garcia PA-C      hydrOXYzine HCL  25 mg Oral Q6H PRN Max 4/day Peterson Danica Medei, CRNP      hydrOXYzine HCL  50 mg Oral Q4H PRN Max 4/day Peterson Danica Medei, CRNP      Or    LORazepam  1 mg Intramuscular Q4H PRN Peterson Danica Medei, CRNP      lithium carbonate  450 mg Oral Daily Peterson Danica Medei, CRNP      lithium carbonate  600 mg Oral HS Peterson Danica Medei, CRNP      LORazepam  1 mg Oral Q6H PRN Peterson Danica Medei, CRNP      Or    LORazepam  2 mg Intramuscular Q6H PRN Max 3/day Peterson Danica Medei, CRNP      metoprolol tartrate  25 mg Oral Q12H [address] Shanice Garcia PA-C      nicotine  1 patch Transdermal Daily Peterson Danica Medei, CRNP      nicotine polacrilex  2 mg Oral Q2H PRN Shanice Garcia PA-C      nystatin  500,000 Units Swish & Swallow 4x Daily Shanice Garica PA-C      OLANZapine  10 mg Oral Q3H PRN Garrett Mendenhall MD      OLANZapine  5 mg Oral Q3H PRN Myrtis Lundborg, MD      OLANZapine  10 mg Intramuscular Q2H PRN Myrtis Lundborg, MD      OLANZapine  15 mg Oral BID Iliana Thomas MD      polyethylene glycol  17 g Oral Daily PRN [place] Medei, CRNP      risperiDONE  4 mg Oral BID [place] Medei, CRNP      traZODone  100 mg Oral HS PRN Estela Suarez, BRIONNA         Behavioral Health Medications:   all current active meds have been reviewed, continue current psychiatric medications and current meds:   Current Facility-Administered Medications   Medication Dose Route Frequency    acetaminophen (TYLENOL) tablet 650 mg  650 mg Oral Q6H PRN    acetaminophen (TYLENOL) tablet 650 mg  650 mg Oral Q4H PRN    acetaminophen (TYLENOL) tablet 975 mg  975 mg Oral Q6H PRN    aluminum-magnesium hydroxide-simethicone (MYLANTA) oral suspension 30 mL  30 mL Oral Q4H PRN    haloperidol lactate (HALDOL) injection 2 5 mg  2 5 mg Intramuscular Q6H PRN Max 4/day    And    LORazepam (ATIVAN) injection 1 mg  1 mg Intramuscular Q6H PRN Max 4/day    And    benztropine (COGENTIN) injection 0 5 mg  0 5 mg Intramuscular Q6H PRN Max 4/day    LORazepam (ATIVAN) injection 2 mg  2 mg Intramuscular Q4H PRN Max 4/day    And    benztropine (COGENTIN) injection 1 mg  1 mg Intramuscular Q4H PRN Max 4/day    benztropine (COGENTIN) tablet 1 mg  1 mg Oral Q6H PRN    chlorproMAZINE (THORAZINE) injection SOLN 50 mg  50 mg Intramuscular Q6H PRN    chlorproMAZINE (THORAZINE) tablet 75 mg  75 mg Oral Q6H PRN    [START ON 7/26/2022] cholecalciferol (VITAMIN D3) tablet 1,000 Units  1,000 Units Oral Daily    clonazePAM (KlonoPIN) tablet 1 mg  1 mg Oral BID    cyanocobalamin (VITAMIN B-12) tablet 1,000 mcg  1,000 mcg Oral Daily    divalproex sodium (DEPAKOTE) EC tablet 1,000 mg  1,000 mg Oral HS    ergocalciferol (VITAMIN D2) capsule 50,000 Units  50,000 Units Oral Weekly    hydrOXYzine HCL (ATARAX) tablet 25 mg  25 mg Oral Q6H PRN Max 4/day    hydrOXYzine HCL (ATARAX) tablet 50 mg  50 mg Oral Q4H PRN Max 4/day    Or    LORazepam (ATIVAN) injection 1 mg  1 mg Intramuscular Q4H PRN    lithium carbonate capsule 450 mg  450 mg Oral Daily    lithium carbonate capsule 600 mg  600 mg Oral HS    LORazepam (ATIVAN) tablet 1 mg  1 mg Oral Q6H PRN    Or    LORazepam (ATIVAN) injection 2 mg  2 mg Intramuscular Q6H PRN Max 3/day    metoprolol tartrate (LOPRESSOR) tablet 25 mg  25 mg Oral Q12H [facility]    nicotine (NICODERM CQ) 7 mg/24hr TD 24 hr patch 1 patch  1 patch Transdermal Daily    nicotine polacrilex (NICORETTE) gum 2 mg  2 mg Oral Q2H PRN    nystatin (MYCOSTATIN) oral suspension 500,000 Units  500,000 Units Swish & Swallow 4x Daily    OLANZapine (ZyPREXA ZYDIS) dispersible tablet 10 mg  10 mg Oral Q3H PRN    OLANZapine (ZyPREXA ZYDIS) dispersible tablet 5 mg  5 mg Oral Q3H PRN    OLANZapine (ZyPREXA) IM injection 10 mg  10 mg Intramuscular Q2H PRN    OLANZapine (ZyPREXA) tablet 15 mg  15 mg Oral BID    polyethylene glycol (MIRALAX) packet 17 g  17 g Oral Daily PRN    risperiDONE (RisperDAL M-TAB) disintegrating tablet 4 mg  4 mg Oral BID    traZODone (DESYREL) tablet 100 mg  100 mg Oral HS PRN     Vital signs in last 24 hours:  Temp:  [97 5 °F (36 4 °C)-99 3 °F (37 4 °C)] 97 5 °F (36 4 °C)  HR:  [] 97  Resp:  [16-18] 16  BP: (119-148)/(72-87) 121/87    Laboratory results:    I have personally reviewed all pertinent laboratory/tests results    Most Recent Labs:   Lab Results   Component Value Date    WBC 6 26 04/23/2022    RBC 5 36 04/23/2022    HGB 16 8 04/23/2022    HCT 51 5 (H) 04/23/2022     04/23/2022    RDW 11 8 04/23/2022    NEUTROABS 4 06 04/23/2022    SODIUM 140 04/23/2022    K 3 6 04/23/2022     04/23/2022    CO2 30 04/23/2022    BUN 9 04/23/2022    CREATININE 1 04 04/23/2022    GLUC 83 04/23/2022    GLUF 83 04/23/2022    CALCIUM 9 9 04/23/2022    AST 39 04/23/2022    ALT 30 04/23/2022    ALKPHOS 58 04/23/2022    TP 7 2 04/23/2022    ALB 4 7 04/23/2022    TBILI 0 88 04/23/2022    CHOLESTEROL 113 04/23/2022    HDL 42 04/23/2022    TRIG 97 04/23/2022    LDLCALC 52 04/23/2022    NONHDLC 71 04/23/2022    VALPROICTOT 108 (H) 04/26/2022    LITHIUM 0 7 04/26/2022    [id number] 1 193 04/23/2022    HGBA1C 5 2 12/17/2020     12/17/2020     Labs in last 72 hours: No results for input(s): WBC, RBC, HGB, HCT, PLT, RDW, NEUTROABS, SODIUM, K, CL, CO2, BUN, CREATININE, GLUC, GLUF, CALCIUM, AST, ALT, ALKPHOS, TP, ALB, TBILI, CHOLESTEROL, HDL, TRIG, LDLCALC, VALPROICTOT, CARBAMAZEPIN, LITHIUM, AMMONIA, [id number], FREET4, T3FREE, PREGTESTUR, PREGSERUM, HCG, HCGQUANT, RPR in the last 72 hours      Invalid input(s):  RBC  Admission Labs:   Admission on 04/18/2022   Component Date Value    WBC 04/23/2022 6 26     RBC 04/23/2022 5 36     Hemoglobin 04/23/2022 16 8     Hematocrit 04/23/2022 51 5*    MCV 04/23/2022 96     MCH 04/23/2022 31 3     MCHC 04/23/2022 32 6     RDW 04/23/2022 11 8     MPV 04/23/2022 9 5     Platelets 37/64/2138 261     nRBC 04/23/2022 0     Neutrophils Relative 04/23/2022 65     Immat GRANS % 04/23/2022 0     Lymphocytes Relative 04/23/2022 23     Monocytes Relative 04/23/2022 9     Eosinophils Relative 04/23/2022 3     Basophils Relative 04/23/2022 0     Neutrophils Absolute 04/23/2022 4 06     Immature Grans Absolute 04/23/2022 0 02     Lymphocytes Absolute 04/23/2022 1 41     Monocytes Absolute 04/23/2022 0 57     Eosinophils Absolute 04/23/2022 0 18     Basophils Absolute 04/23/2022 0 02     Sodium 04/23/2022 140     Potassium 04/23/2022 3 6     Chloride 04/23/2022 102     CO2 04/23/2022 30     ANION GAP 04/23/2022 8     BUN 04/23/2022 9     Creatinine 04/23/2022 1 04     Glucose 04/23/2022 83     Glucose, Fasting 04/23/2022 83     Calcium 04/23/2022 9 9     AST 04/23/2022 39     ALT 04/23/2022 30     Alkaline Phosphatase 04/23/2022 58     Total Protein 04/23/2022 7 2     Albumin 04/23/2022 4 7     Total Bilirubin 04/23/2022 0 88     eGFR 04/23/2022 97     Cholesterol 04/23/2022 113     Triglycerides 04/23/2022 97     HDL, Direct 04/23/2022 42     LDL Calculated 04/23/2022 52     Non-HDL-Chol (CHOL-HDL) 04/23/2022 71     TSH 3RD GENERATON 04/23/2022 1  193     Vitamin B-12 04/23/2022 241     Vit D, 25-Hydroxy 04/23/2022 13 6*    Folate 04/23/2022 6 5     Lithium Lvl 04/26/2022 0 7     Valproic Acid, Total 04/26/2022 108*    Ventricular Rate 04/23/2022 130     Atrial Rate 04/23/2022 130     OH Interval 04/23/2022 130     QRSD Interval 04/23/2022 72     QT Interval 04/23/2022 324     QTC Interval 04/23/2022 476     P Axis 04/23/2022 67     QRS Axis 04/23/2022 102     T Wave Axis 04/23/2022 10     Ventricular Rate 04/23/2022 132     Atrial Rate 04/23/2022 132     OH Interval 04/23/2022 112     QRSD Interval 04/23/2022 72     QT Interval 04/23/2022 320     QTC Interval 04/23/2022 474     P Axis 04/23/2022 67     QRS Axis 04/23/2022 102     T Wave Axis 04/23/2022 -4     Ventricular Rate 04/23/2022 126     Atrial Rate 04/23/2022 126     OH Interval 04/23/2022 138     QRSD Interval 04/23/2022 66     QT Interval 04/23/2022 296     QTC Interval 04/23/2022 428     P Axis 04/23/2022 60     QRS Axis 04/23/2022 82     T Wave Axis 04/23/2022 26        Psychiatric Review of Systems:  Behavior over the last 24 hours:  unchanged  Sleep: insomnia  Appetite: normal  Medication side effects: No  Review of systems: no complaints    Mental Status Evaluation:  Appearance:  age appropriate, casually dressed and disheveled   Behavior:  psychomotor agitation, restless and fidgety and possessing intermittent eye contact   Speech:  increased latency of response and loud   Mood:  anxious   Affect:  mood-congruent   Language naming objects and repeating phrases   Thought Process:  perserverative, slightly less disorganized   Thought Content:  delusions  persecutory Perceptual Disturbances: None  Appears internally preoccupied   Risk Potential: Suicidal Ideations none, Homicidal Ideations none and Potential for Aggression No   Sensorium:  person, place and situation   Cognition:  recent and remote memory: unable to assess due to lack of cooperation   Consciousness:  alert and awake    Attention: attention span appeared shorter than expected for age   Insight:  limited   Judgment: limited   Intellect    Gait/Station: normal gait/station and normal balance   Motor Activity: no abnormal movements     Memory: Short and long term memory  Unable to assess      Progress Toward Goals: progressing, as evidenced by their participation (or lack thereof) in individual, social and therapeutic milieu in addition to adherence to their medication regimen  Recommended Treatment:   See above for assessment and plan      Inpatient Psychiatric Certification: Based upon physical, mental and social evaluations, I certify that inpatient psychiatric services are medically necessary for this patient for a duration of greater than 2 midnights for the treatment of Schizoaffective disorder, bipolar type ([facility] [address] )       Continue following current medications:   Current Facility-Administered Medications   Medication Dose Route Frequency Provider Last Rate    acetaminophen  650 mg Oral Q6H PRN [place] Medei, CRNP      acetaminophen  650 mg Oral Q4H PRN [place] Medei, CRNP      acetaminophen  975 mg Oral Q6H PRN [place] Medei, CRNP      aluminum-magnesium hydroxide-simethicone  30 mL Oral Q4H PRN [place] Medei, CRNP      haloperidol lactate  2 5 mg Intramuscular Q6H PRN Max 4/day [place] Medei, CRNP      And    LORazepam  1 mg Intramuscular Q6H PRN Max 4/day [place] Medei, CRNP      And    benztropine  0 5 mg Intramuscular Q6H PRN Max 4/day [place] Medei, CRNP      LORazepam  2 mg Intramuscular Q4H PRN Max 4/day [place] Medei, CRNP      And    benztropine  1 mg Intramuscular Q4H PRN Max 4/day Pama [place] Medei, CRNP      benztropine  1 mg Oral Q6H PRN Pama [place] Medei, CRNP      chlorproMAZINE  50 mg Intramuscular Q6H PRN Jaswant Medicine, MD      chlorproMAZINE  75 mg Oral Q6H PRN Jaswant Medicine, MD      [START ON 7/26/2022] cholecalciferol  1,000 Units Oral Daily Shanice Garcia PA-C      clonazePAM  1 mg Oral BID Ivanna M Medei, CRNP      cyanocobalamin  1,000 mcg Oral Daily CRISTIANO GuerraC      divalproex sodium  1,000 mg Oral HS Jaswant Medicine, MD      ergocalciferol  50,000 Units Oral Weekly Shanice Garcia PA-C      hydrOXYzine HCL  25 mg Oral Q6H PRN Max 4/day Ivanna M Medei, CRNP      hydrOXYzine HCL  50 mg Oral Q4H PRN Max 4/day Pama Tj Medei, CRNP      Or    LORazepam  1 mg Intramuscular Q4H PRN Pama Tj Medei, CRNP      lithium carbonate  450 mg Oral Daily Pama [place] Medei, CRNP      lithium carbonate  600 mg Oral HS Pama [place] Medei, CRNP      LORazepam  1 mg Oral Q6H PRN Pama [place] Medei, CRNP      Or    LORazepam  2 mg Intramuscular Q6H PRN Max 3/day Pama Tj Medei, CRNP      metoprolol tartrate  25 mg Oral Q12H [address] Shanice Garcia PA-C      nicotine  1 patch Transdermal Daily Ivanna M Medei, CRNP      nicotine polacrilex  2 mg Oral Q2H PRN CRISTIANO GuerraC      nystatin  500,000 Units Swish & Swallow 4x Daily Shanice Garcia PA-C      OLANZapine  10 mg Oral Q3H PRN Jaswant Medicine, MD      OLANZapine  5 mg Oral Q3H PRN Jaswant Medicine, MD      OLANZapine  10 mg Intramuscular Q2H PRN Jaswant Medicine, MD      OLANZapine  15 mg Oral BID Sid Flores MD      polyethylene glycol  17 g Oral Daily PRN Pama Tj Medei, CRNP      risperiDONE  4 mg Oral BID Pama Tj Medei, CRNP      traZODone  100 mg Oral HS PRN Pama [place] Medei, CRNP         Risks, benefits and possible side effects of Medications:   Patient does not verbalize understanding at this time and will require further explanation  This note was not shared with the patient due to reasonable likelihood of causing patient harm     This note has been constructed using a voice recognition system  There may be translation, syntax,  or grammatical errors  If you have any questions, please contact the dictating provider      Gambia C Holter, DO

## 2022-05-01 NOTE — NURSING NOTE
Patient out at 0500 stating he cannot fall back to sleep  Stated he only slept for ten minutes after last dose of atarax  Informed patient he slept for the better part of two hours since the last time he was out and he has no additional medication available at this time  Communicated observation to patient that he appears very tired and if he lies down he will likely fall back to sleep  Patient requested a room change bc he says he cannot sleep during the day due to the noise of patients socializing  Attempted to educate patient about sleep hygiene and not sleeping during the day so that he will be better able to sleep at night  Patient became upset and said the people in this hospital do not know what they are doing    Returned to his room

## 2022-05-01 NOTE — NURSING NOTE
Patient compliant with meds and meals  Patient requested PRN meds due to acute anxiety and agitation r/t overstimulating unit gave patient 75mg thorazine and 1mg ativan which was effective  Patient denies everything and states he is feeling much better and feels ready to go home but patient states he feels his family doesn't care about him since they haven't called him, this writer tried to reassure patient that, that wasn't true and patient still felt upset over it but was able to change subject  Patient is pleasant and cooperative but at times can become irritable and verbally aggressive  Q 7 min behavioral and safety checks

## 2022-05-01 NOTE — PLAN OF CARE
Problem: Alteration in Thoughts and Perception  Goal: Treatment Goal: Gain control of psychotic behaviors/thinking, reduce/eliminate presenting symptoms and demonstrate improved reality functioning upon discharge  Outcome: Progressing  Goal: Verbalize thoughts and feelings  Description: Interventions:  - Promote a nonjudgmental and trusting relationship with the patient through active listening and therapeutic communication  - Assess patient's level of functioning, behavior and potential for risk  - Engage patient in 1 on 1 interactions  - Encourage patient to express fears, feelings, frustrations, and discuss symptoms    - [place] patient to reality, help patient recognize reality-based thinking   - Administer medications as ordered and assess for potential side effects  - Provide the patient education related to the signs and symptoms of the illness and desired effects of prescribed medications  Outcome: Progressing  Goal: Refrain from acting on delusional thinking/internal stimuli  Description: Interventions:  - Monitor patient closely, per order   - Utilize least restrictive measures   - Set reasonable limits, give positive feedback for acceptable   - Administer medications as ordered and monitor of potential side effects  Outcome: Progressing  Goal: Agree to be compliant with medication regime, as prescribed and report medication side effects  Description: Interventions:  - Offer appropriate PRN medication and supervise ingestion; conduct AIMS, as needed   Outcome: Progressing  Goal: Attend and participate in unit activities, including therapeutic, recreational, and educational groups  Description: Interventions:  -Encourage Visitation and family involvement in care  Outcome: Progressing  Goal: Recognize dysfunctional thoughts, communicate reality-based thoughts at the time of discharge  Description: Interventions:  - Provide medication and psycho-education to assist patient in compliance and developing insight into his/her illness   Outcome: Progressing  Goal: Complete daily ADLs, including personal hygiene independently, as able  Description: Interventions:  - Observe, teach, and assist patient with ADLS  - Monitor and promote a balance of rest/activity, with adequate nutrition and elimination   Outcome: Progressing

## 2022-05-02 PROCEDURE — 99232 SBSQ HOSP IP/OBS MODERATE 35: CPT | Performed by: PSYCHIATRY & NEUROLOGY

## 2022-05-02 RX ADMIN — LITHIUM CARBONATE 450 MG: 300 CAPSULE, GELATIN COATED ORAL at 08:15

## 2022-05-02 RX ADMIN — METOPROLOL TARTRATE 25 MG: 25 TABLET ORAL at 08:15

## 2022-05-02 RX ADMIN — OLANZAPINE 15 MG: 15 TABLET, FILM COATED ORAL at 17:05

## 2022-05-02 RX ADMIN — CYANOCOBALAMIN TAB 500 MCG 1000 MCG: 500 TAB at 08:15

## 2022-05-02 RX ADMIN — DIVALPROEX SODIUM 1000 MG: 500 TABLET, DELAYED RELEASE ORAL at 20:49

## 2022-05-02 RX ADMIN — RISPERIDONE 4 MG: 2 TABLET, ORALLY DISINTEGRATING ORAL at 17:05

## 2022-05-02 RX ADMIN — CLONAZEPAM 1 MG: 1 TABLET ORAL at 17:05

## 2022-05-02 RX ADMIN — RISPERIDONE 4 MG: 2 TABLET, ORALLY DISINTEGRATING ORAL at 08:15

## 2022-05-02 RX ADMIN — METOPROLOL TARTRATE 25 MG: 25 TABLET ORAL at 20:49

## 2022-05-02 RX ADMIN — LITHIUM CARBONATE 600 MG: 300 CAPSULE, GELATIN COATED ORAL at 20:49

## 2022-05-02 RX ADMIN — OLANZAPINE 15 MG: 15 TABLET, FILM COATED ORAL at 08:15

## 2022-05-02 RX ADMIN — CLONAZEPAM 1 MG: 1 TABLET ORAL at 08:15

## 2022-05-02 RX ADMIN — NYSTATIN 500000 UNITS: 100000 SUSPENSION ORAL at 20:49

## 2022-05-02 RX ADMIN — ERGOCALCIFEROL 50000 UNITS: 1.25 CAPSULE ORAL at 08:15

## 2022-05-02 NOTE — SOCIAL WORK
CM met with PT for PT check in  PT inquiring about when he will be D/C  CM reviewed no date at this time, but will follow up with provider  PT flat, slightly irritable edge  Reports he will not eat since no D/C  Much reassurance and validation provided

## 2022-05-02 NOTE — PROGRESS NOTES
Progress Note - Behavioral Health   Kevin Palmer 31 y o  male MRN: [id number]  Unit/Bed#: [facility] 224-01 Encounter: [id number]    Assessment/Plan   Principal Problem:    Schizoaffective disorder, bipolar type ([address] )      Subjective: Patient was seen, chart reviewed and case discussed with team    Patient seen in room  Patient is calm at first and rather bizarre  Patient is immediately perseverative about discharge and seems to escalate and follow this writer around  He is redirectable and overall stays calm  The patient is disorganized, irritable and anxious  Patient has been sleeping and has had adequate oral intake  The patient has been medication compliant and without any serious side effects verbalized              Psychiatric Review of Systems:  Behavior over the last 24 hours:  unchanged  Sleep: normal  Appetite: normal  Medication side effects: No  ROS: no complaints, all others negative    Current Medications:  Current Facility-Administered Medications   Medication Dose Route Frequency    acetaminophen (TYLENOL) tablet 650 mg  650 mg Oral Q6H PRN    acetaminophen (TYLENOL) tablet 650 mg  650 mg Oral Q4H PRN    acetaminophen (TYLENOL) tablet 975 mg  975 mg Oral Q6H PRN    aluminum-magnesium hydroxide-simethicone (MYLANTA) oral suspension 30 mL  30 mL Oral Q4H PRN    haloperidol lactate (HALDOL) injection 2 5 mg  2 5 mg Intramuscular Q6H PRN Max 4/day    And    LORazepam (ATIVAN) injection 1 mg  1 mg Intramuscular Q6H PRN Max 4/day    And    benztropine (COGENTIN) injection 0 5 mg  0 5 mg Intramuscular Q6H PRN Max 4/day    LORazepam (ATIVAN) injection 2 mg  2 mg Intramuscular Q4H PRN Max 4/day    And    benztropine (COGENTIN) injection 1 mg  1 mg Intramuscular Q4H PRN Max 4/day    benztropine (COGENTIN) tablet 1 mg  1 mg Oral Q6H PRN    chlorproMAZINE (THORAZINE) injection SOLN 50 mg  50 mg Intramuscular Q6H PRN    chlorproMAZINE (THORAZINE) tablet 75 mg  75 mg Oral Q6H PRN    [START ON 7/26/2022] cholecalciferol (VITAMIN D3) tablet 1,000 Units  1,000 Units Oral Daily    clonazePAM (KlonoPIN) tablet 1 mg  1 mg Oral BID    cyanocobalamin (VITAMIN B-12) tablet 1,000 mcg  1,000 mcg Oral Daily    divalproex sodium (DEPAKOTE) EC tablet 1,000 mg  1,000 mg Oral HS    ergocalciferol (VITAMIN D2) capsule 50,000 Units  50,000 Units Oral Weekly    hydrOXYzine HCL (ATARAX) tablet 25 mg  25 mg Oral Q6H PRN Max 4/day    hydrOXYzine HCL (ATARAX) tablet 50 mg  50 mg Oral Q4H PRN Max 4/day    Or    LORazepam (ATIVAN) injection 1 mg  1 mg Intramuscular Q4H PRN    lithium carbonate capsule 450 mg  450 mg Oral Daily    lithium carbonate capsule 600 mg  600 mg Oral HS    LORazepam (ATIVAN) tablet 1 mg  1 mg Oral Q6H PRN    Or    LORazepam (ATIVAN) injection 2 mg  2 mg Intramuscular Q6H PRN Max 3/day    metoprolol tartrate (LOPRESSOR) tablet 25 mg  25 mg Oral Q12H JUDIE    nicotine (NICODERM CQ) 7 mg/24hr TD 24 hr patch 1 patch  1 patch Transdermal Daily    nicotine polacrilex (NICORETTE) gum 2 mg  2 mg Oral Q2H PRN    nystatin (MYCOSTATIN) oral suspension 500,000 Units  500,000 Units Swish & Swallow 4x Daily    OLANZapine (ZyPREXA ZYDIS) dispersible tablet 10 mg  10 mg Oral Q3H PRN    OLANZapine (ZyPREXA ZYDIS) dispersible tablet 5 mg  5 mg Oral Q3H PRN    OLANZapine (ZyPREXA) IM injection 10 mg  10 mg Intramuscular Q2H PRN    OLANZapine (ZyPREXA) tablet 15 mg  15 mg Oral BID    polyethylene glycol (MIRALAX) packet 17 g  17 g Oral Daily PRN    risperiDONE (RisperDAL M-TAB) disintegrating tablet 4 mg  4 mg Oral BID    traZODone (DESYREL) tablet 100 mg  100 mg Oral HS PRN       Behavioral Health Medications: all current active meds have been reviewed  Vitals:  Vitals:    05/02/22 0759   BP: 115/91   Pulse: 99   Resp: 18   Temp: 97 9 °F (36 6 °C)   SpO2: 100%       Laboratory results:    I have personally reviewed all pertinent laboratory/tests results    Most Recent Labs:   Lab Results   Component Value Date WBC 6 26 04/23/2022    RBC 5 36 04/23/2022    HGB 16 8 04/23/2022    HCT 51 5 (H) 04/23/2022     04/23/2022    RDW 11 8 04/23/2022    NEUTROABS 4 06 04/23/2022    SODIUM 140 04/23/2022    K 3 6 04/23/2022     04/23/2022    CO2 30 04/23/2022    BUN 9 04/23/2022    CREATININE 1 04 04/23/2022    GLUC 83 04/23/2022    GLUF 83 04/23/2022    CALCIUM 9 9 04/23/2022    AST 39 04/23/2022    ALT 30 04/23/2022    ALKPHOS 58 04/23/2022    TP 7 2 04/23/2022    ALB 4 7 04/23/2022    TBILI 0 88 04/23/2022    CHOLESTEROL 113 04/23/2022    HDL 42 04/23/2022    TRIG 97 04/23/2022    LDLCALC 52 04/23/2022    NONHDLC 71 04/23/2022    VALPROICTOT 108 (H) 04/26/2022    LITHIUM 0 7 04/26/2022    DQO9WSAQCVWA 1 193 04/23/2022    HGBA1C 5 2 12/17/2020     12/17/2020       Mental Status Evaluation:  Appearance:  casually dressed and disheveled   Behavior:  restless and fidgety and Some eye contact but is intense  Speech:  normal pitch and normal volume   Mood:  anxious and depressed   Affect:  blunted   Language Appropriate   Thought Process:  disorganized and perserverative   Thought Content:  delusions  persecutory   Perceptual Disturbances: Patient does appear to be internally preoccupied  Risk Potential: Suicidal Ideations none and Homicidal Ideations none   Sensorium:  person and time/date   Cognition:  recent and remote memory: unable to assess due to lack of cooperation   Consciousness:  alert and awake    Attention: attention span appeared shorter than expected for age   Insight:  limited   Judgment: limited   Gait/Station: normal gait/station and normal balance   Motor Activity: no abnormal movements     Progress Toward Goals:  Progressing    Recommended Treatment: Continue with pharmacotherapy, group therapy, milieu therapy and occupational therapy  1  Continue current medications and treatments for now    2  Discharge planning    Risks, benefits and possible side effects of Medications:   Risks, benefits, and possible side effects of medications explained to patient and patient verbalizes understanding

## 2022-05-02 NOTE — PLAN OF CARE
Problem: Ineffective Coping  Goal: Participates in unit activities  Description: Interventions:  - Provide therapeutic environment   - Provide required programming   - Redirect inappropriate behaviors   Outcome: Progressing    Group Goals:  Mindful Stressor Triggers and Healthy Ways to [place]   Attendance: 1/2   Participation: minimal participation, disorganized, pre-occupied with thoughts of discharge   Mood/Affect: labile, anxious   Behaviors/Redirection: pt required consistent redirection

## 2022-05-02 NOTE — PROGRESS NOTES
Progress Note - Tam Lacy 31 y o  male MRN: [id number]    Unit/Bed#: [facility] 224-01 Encounter: [id number]        Subjective:   Patient seen and examined at bedside after reviewing the chart and discussing the case with the caring staff  Patient examined at bedside  Patient has no acute complaints  Physical Exam   Vitals: Blood pressure 115/91, pulse 99, temperature 97 9 °F (36 6 °C), temperature source Temporal, resp  rate 18, height 5' 6" (1 676 m), weight 77 1 kg (170 lb), SpO2 100 %  ,Body mass index is 27 44 kg/m²  Constitutional:  Patient appears in no acute distress  HEENT: PERR, EOMI, MMM  Cardiovascular:  Regular rate, regular rhythm  Pulmonary/Chest: Effort normal and breath sounds normal    Abdomen: Soft, + BS, NT  Assessment/Plan:  Tam Lacy is a(n) 31y o  year old male with schizoaffective disorder bipolar type      1  Tobacco abuse  Patient has been put on nicotine transdermal patch 7 mg daily  2  Arthritis/headache  Patient may get Tylenol on as needed basis  3  GERD  Patient may take Mylanta as needed  4  Insomnia  Patient may get trazodone as needed  5  Vitamin-D deficiency  Patient started on vitamin D2 42761 units weekly for 14 weeks followed by vitamin D3 1000 units daily  6  Vitamin B12 deficiency  Patient started on vitamin B12 supplement  7  Tachycardia  Patient started on metoprolol tartrate 25 mg twice daily on 04/23/2022  Continue to monitor closely  8  Oral candidiasis  Patient started on nystatin swish and swallow 4 times daily for 10 days  The patient was discussed with Dr Sharita Jones and he is in agreement with the above note

## 2022-05-02 NOTE — NURSING NOTE
Patient was anxious and restless  Patient easily agitated and disorganized  Constant staff support and encouragement given  Patient compliant with medications  Patient attending select groups  Q 7 minute safety checks maintained  Patient denies SI/HI  Patient continues to require consistent staff redirection  Staff will continue to monitor and support

## 2022-05-02 NOTE — NURSING NOTE
Patient observed within the milieu awake and alert, visibly drowsy  Mary Davenport  Has no obvious signs of pain or distress  He was observed saying random illogical outburst to himself at times  Denies SI/HI,AVH, denies anxiety and depression  Patient consumed night snack, compliant with night medications  Patient observed sleeping after medication administration

## 2022-05-02 NOTE — NURSING NOTE
Patient observed sleeping through the majority of the night,l he awakened a few times but immediately returned to sleep  No distress noted  Q 7 minute rounds checks continued

## 2022-05-02 NOTE — PLAN OF CARE
Problem: Alteration in Thoughts and Perception  Goal: Treatment Goal: Gain control of psychotic behaviors/thinking, reduce/eliminate presenting symptoms and demonstrate improved reality functioning upon discharge  Outcome: Not Progressing  Goal: Verbalize thoughts and feelings  Description: Interventions:  - Promote a nonjudgmental and trusting relationship with the patient through active listening and therapeutic communication  - Assess patient's level of functioning, behavior and potential for risk  - Engage patient in 1 on 1 interactions  - Encourage patient to express fears, feelings, frustrations, and discuss symptoms    - [place] patient to reality, help patient recognize reality-based thinking   - Administer medications as ordered and assess for potential side effects  - Provide the patient education related to the signs and symptoms of the illness and desired effects of prescribed medications  Outcome: Not Progressing  Goal: Refrain from acting on delusional thinking/internal stimuli  Description: Interventions:  - Monitor patient closely, per order   - Utilize least restrictive measures   - Set reasonable limits, give positive feedback for acceptable   - Administer medications as ordered and monitor of potential side effects  Outcome: Not Progressing  Goal: Agree to be compliant with medication regime, as prescribed and report medication side effects  Description: Interventions:  - Offer appropriate PRN medication and supervise ingestion; conduct AIMS, as needed   Outcome: Not Progressing  Goal: Attend and participate in unit activities, including therapeutic, recreational, and educational groups  Description: Interventions:  -Encourage Visitation and family involvement in care  Outcome: Not Progressing  Goal: Recognize dysfunctional thoughts, communicate reality-based thoughts at the time of discharge  Description: Interventions:  - Provide medication and psycho-education to assist patient in compliance and developing insight into his/her illness   Outcome: Not Progressing  Goal: Complete daily ADLs, including personal hygiene independently, as able  Description: Interventions:  - Observe, teach, and assist patient with ADLS  - Monitor and promote a balance of rest/activity, with adequate nutrition and elimination   Outcome: Not Progressing     Problem: Depression  Goal: Treatment Goal: Demonstrate behavioral control of depressive symptoms, verbalize feelings of improved mood/affect, and adopt new coping skills prior to discharge  Outcome: Not Progressing  Goal: Verbalize thoughts and feelings  Description: Interventions:  - Assess and re-assess patient's level of risk   - Engage patient in 1:1 interactions, daily, for a minimum of 15 minutes   - Encourage patient to express feelings, fears, frustrations, hopes   Outcome: Not Progressing  Goal: Refrain from isolation  Description: Interventions:  - Develop a trusting relationship   - Encourage socialization   Outcome: Not Progressing  Goal: Refrain from self-neglect  Outcome: Not Progressing  Goal: Attend and participate in unit activities, including therapeutic, recreational, and educational groups  Description: Interventions:  - Provide therapeutic and educational activities daily, encourage attendance and participation, and document same in the medical record   Outcome: Not Progressing  Goal: Complete daily ADLs, including personal hygiene independently, as able  Description: Interventions:  - Observe, teach, and assist patient with ADLS  -  Monitor and promote a balance of rest/activity, with adequate nutrition and elimination   Outcome: Not Progressing     Problem: Risk for Violence/Aggression Toward Others  Goal: Treatment Goal: Refrain from acts of violence/aggression during length of stay, and demonstrate improved impulse control at the time of discharge  Outcome: Not Progressing  Goal: Verbalize thoughts and feelings  Description: Interventions:  - Assess and re-assess patient's level of risk, every waking shift  - Engage patient in 1:1 interactions, daily, for a minimum of 15 minutes   - Allow patient to express feelings and frustrations in a safe and non-threatening manner   - Establish rapport/trust with patient   Outcome: Not Progressing  Goal: Refrain from harming others  Outcome: Not Progressing  Goal: Refrain from destructive acts on the environment or property  Outcome: Not Progressing  Goal: Control angry outbursts  Description: Interventions:  - Monitor patient closely, per order  - Ensure early verbal de-escalation  - Monitor prn medication needs  - Set reasonable/therapeutic limits, outline behavioral expectations, and consequences   - Provide a non-threatening milieu, utilizing the least restrictive interventions   Outcome: Not Progressing  Goal: Attend and participate in unit activities, including therapeutic, recreational, and educational groups  Description: Interventions:  - Provide therapeutic and educational activities daily, encourage attendance and participation, and document same in the medical record   Outcome: Not Progressing  Goal: Identify appropriate positive anger management techniques  Description: Interventions:  - Offer anger management and coping skills groups   - Staff will provide positive feedback for appropriate anger control  Outcome: Not Progressing     Problem: Ineffective Coping  Goal: Participates in unit activities  Description: Interventions:  - Provide therapeutic environment   - Provide required programming   - Redirect inappropriate behaviors   Outcome: Not Progressing     Problem: DISCHARGE PLANNING  Goal: Discharge to home or other facility with appropriate resources  Description: INTERVENTIONS:  - Identify barriers to discharge w/patient and caregiver  - Arrange for needed discharge resources and transportation as appropriate  - Identify discharge learning needs (meds, wound care, etc )  - Refer to Case Management Department for coordinating discharge planning if the patient needs post-hospital services based on physician/advanced practitioner order or complex needs related to functional status, cognitive ability, or social support system  Outcome: Not Progressing     Problem: Nutrition/Hydration-ADULT  Goal: Nutrient/Hydration intake appropriate for improving, restoring or maintaining nutritional needs  Description: Monitor and assess patient's nutrition/hydration status for malnutrition  Collaborate with interdisciplinary team and initiate plan and interventions as ordered  Monitor patient's weight and dietary intake as ordered or per policy  Utilize nutrition screening tool and intervene as necessary  Determine patient's food preferences and provide high-protein, high-caloric foods as appropriate       INTERVENTIONS:  - Monitor oral intake, urinary output, labs, and treatment plans  - Assess nutrition and hydration status and recommend course of action  - Evaluate amount of meals eaten  - Assist patient with eating if necessary   - Allow adequate time for meals  - Recommend/ encourage appropriate diets, oral nutritional supplements, and vitamin/mineral supplements  - Order, calculate, and assess calorie counts as needed  - Recommend, monitor, and adjust tube feedings and TPN/PPN based on assessed needs  - Assess need for intravenous fluids  - Provide specific nutrition/hydration education as appropriate  - Include patient/family/caregiver in decisions related to nutrition  Outcome: Not Progressing

## 2022-05-02 NOTE — PROGRESS NOTES
05/02/22 0730   Activity/Group Checklist   Group   [employer])   Attendance Attended   Attendance Duration (min) 46-60   Interactions Disorganized interaction   Affect/Mood Normal range   Goals Achieved Identified feelings; Discussed coping strategies; Able to listen to others; Able to engage in interactions

## 2022-05-02 NOTE — CASE MANAGEMENT
Case Management Progress Note    Patient name Kevin Learn  Location [facility] 224/-99 MRN [id number]  : 1995 Date 2022       LOS (days): 14  Geometric Mean LOS (GMLOS) (days):   Days to GMLOS:        OBJECTIVE:        Current admission status: Inpatient Psych  Preferred Pharmacy:   UNKNOWN - FOLLOW UP PRIOR TO DISCHARGE TO E-PRESCRIBE  No address on file      Primary Care Provider: No primary care provider on file  Primary Insurance: [place] [employer]  Secondary Insurance:     PROGRESS NOTE:  This writer met with patient to discuss his treatment  Patient had ICM services with [place] and outpatient services at [facility]  His services was discontinued because he did not have insurance and patient did not reapply  Patient has not taken his medications, and he had decreased sleep and bizarre behaviors  Patient lives with his parents, siblings and grandparents  He worked at the [employer] part time  During the assessment patient became bizarre and tangential  Patient states he's ready for discharge  This writer requested to speak with patient's mother  He reports he does not have her phone number, but he provided the number for his Uncle's store  Patient continued with bizarre behaviors and required a PRN for increased agitation

## 2022-05-02 NOTE — PROGRESS NOTES
05/02/22 1000   Activity/Group Checklist   Group   (Mindful Stressor Triggers and Healthy Ways to [employer])   Attendance Attended   Attendance Duration (min) 31-45   Interactions Disorganized interaction   Affect/Mood Normal range; Wide   Goals Achieved Identified feelings; Identified triggers

## 2022-05-02 NOTE — PROGRESS NOTES
05/02/22 0900   Team Members Present   Team Members Present Physician;Nurse;; Other (Discipline and Name)   Physician Team Member [address] [facility] Team Member Robbie DUMONT   Social Work Team Member Tanner   Other (Discipline and Name) Orvis Chidi [facility]   Patient/Family Present   Patient Present No   Patient's Family Present No     Patient continues to be bizarre and required PRN  Patient continues to have decreased sleep

## 2022-05-03 LAB — LITHIUM SERPL-SCNC: 0.7 MMOL/L (ref 0.5–1)

## 2022-05-03 PROCEDURE — 80178 ASSAY OF LITHIUM: CPT | Performed by: NURSE PRACTITIONER

## 2022-05-03 PROCEDURE — 99232 SBSQ HOSP IP/OBS MODERATE 35: CPT | Performed by: NURSE PRACTITIONER

## 2022-05-03 RX ORDER — OLANZAPINE 15 MG/1
15 TABLET ORAL DAILY
Status: DISCONTINUED | OUTPATIENT
Start: 2022-05-03 | End: 2022-05-05

## 2022-05-03 RX ORDER — OLANZAPINE 10 MG/1
10 TABLET ORAL DAILY
Status: DISCONTINUED | OUTPATIENT
Start: 2022-05-04 | End: 2022-05-05

## 2022-05-03 RX ORDER — TRAZODONE HYDROCHLORIDE 150 MG/1
150 TABLET ORAL
Status: DISCONTINUED | OUTPATIENT
Start: 2022-05-03 | End: 2022-05-25 | Stop reason: HOSPADM

## 2022-05-03 RX ADMIN — OLANZAPINE 15 MG: 15 TABLET, FILM COATED ORAL at 17:40

## 2022-05-03 RX ADMIN — CLONAZEPAM 1 MG: 1 TABLET ORAL at 17:40

## 2022-05-03 RX ADMIN — DIVALPROEX SODIUM 1000 MG: 500 TABLET, DELAYED RELEASE ORAL at 21:15

## 2022-05-03 RX ADMIN — CHLORPROMAZINE HYDROCHLORIDE 75 MG: 25 TABLET, SUGAR COATED ORAL at 15:25

## 2022-05-03 RX ADMIN — OLANZAPINE 15 MG: 15 TABLET, FILM COATED ORAL at 08:46

## 2022-05-03 RX ADMIN — LITHIUM CARBONATE 600 MG: 300 CAPSULE, GELATIN COATED ORAL at 20:30

## 2022-05-03 RX ADMIN — CYANOCOBALAMIN TAB 500 MCG 1000 MCG: 500 TAB at 08:46

## 2022-05-03 RX ADMIN — ACETAMINOPHEN 975 MG: 325 TABLET ORAL at 10:47

## 2022-05-03 RX ADMIN — RISPERIDONE 4 MG: 2 TABLET, ORALLY DISINTEGRATING ORAL at 17:40

## 2022-05-03 RX ADMIN — TRAZODONE HYDROCHLORIDE 100 MG: 50 TABLET ORAL at 00:10

## 2022-05-03 RX ADMIN — LORAZEPAM 1 MG: 1 TABLET ORAL at 15:26

## 2022-05-03 RX ADMIN — ACETAMINOPHEN 650 MG: 325 TABLET ORAL at 02:20

## 2022-05-03 RX ADMIN — CHLORPROMAZINE HYDROCHLORIDE 75 MG: 25 TABLET, SUGAR COATED ORAL at 07:00

## 2022-05-03 RX ADMIN — CLONAZEPAM 1 MG: 1 TABLET ORAL at 08:46

## 2022-05-03 RX ADMIN — CHLORPROMAZINE HYDROCHLORIDE 75 MG: 25 TABLET, SUGAR COATED ORAL at 23:46

## 2022-05-03 RX ADMIN — LORAZEPAM 1 MG: 1 TABLET ORAL at 09:27

## 2022-05-03 RX ADMIN — RISPERIDONE 4 MG: 2 TABLET, ORALLY DISINTEGRATING ORAL at 08:46

## 2022-05-03 RX ADMIN — HYDROXYZINE HYDROCHLORIDE 50 MG: 50 TABLET, FILM COATED ORAL at 02:20

## 2022-05-03 RX ADMIN — METOPROLOL TARTRATE 25 MG: 25 TABLET ORAL at 20:29

## 2022-05-03 RX ADMIN — LITHIUM CARBONATE 450 MG: 300 CAPSULE, GELATIN COATED ORAL at 08:46

## 2022-05-03 NOTE — PROGRESS NOTES
Progress Note - Behavioral Health   Andra Ayala 31 y o  male MRN: [id number]  Unit/Bed#: U 224-01 Encounter: [id number]    Assessment/Plan   Principal Problem:    Schizoaffective disorder, bipolar type ([address] )      Behavior over the last 24 hours:  unchanged  Sleep: broken  Appetite: normal  Medication side effects: yes, sedation  ROS: no complaints and all other systems are negative    Monica Anand was seen this morning for psychiatric follow-up  Continues to endorse paranoia and suspicion of others  Appears restless and easily distracted  He was less perseverative on discharge and disorganized in thought  Mormon preoccupation present  Continues to exhibit intermittent episodes of increased agitation, but responds to verbal redirection  Had verbal altercation with peer yesterday over [employer] content  Sleep intermittent, but Monica Anand has been compliant with medications and meals  Denies SI/HI  Mental Status Evaluation:  Appearance:  age appropriate and Marginal hygiene   Behavior:  guarded and restless and fidgety, suspicious   Speech:  tangential and Mumbled   Mood:  anxious and dysthymic   Affect:  mood-congruent and redirectable   Thought Process:  disorganized, illogical and tangential   Thought Content:  Religiously preoccupied, paranoid   Perceptual Disturbances: Appears internally preoccupied   Risk Potential: Suicidal Ideations none  Homicidal Ideations none  Potential for Aggression Yes due to paranoia and intermittent agitation   Sensorium:  person and place   Memory:  recent and remote memory: unable to assess due to lack of cooperation   Consciousness:  alert and sedated    Attention: Decreased attention/concentration   Insight:  impaired due to Psychosis   Judgment: impaired due to Psychosis   Gait/Station: normal gait/station and normal balance   Motor Activity: no abnormal movements     Progress Toward Goals:  No improvement  Patient remains paranoid, disorganized, and intermittently agitated  Appeared somewhat sedated today and easily distracted  Will decrease morning dose of Zyprexa 10 mg to avoid over sedation and continue with remainder psychotropic regimen as ordered  Lithium level drawn today and within normal limits (0 7)  No discharge date at this time  Recommended Treatment: Continue with group therapy, milieu therapy and occupational therapy  Risks, benefits and possible side effects of Medications:   Patient does not verbalize understanding at this time and will require further explanation        Medications:   Decrease Zyprexa 10mg PO QD (0900)  all current active meds have been reviewed and current meds:   Current Facility-Administered Medications   Medication Dose Route Frequency    acetaminophen (TYLENOL) tablet 650 mg  650 mg Oral Q6H PRN    acetaminophen (TYLENOL) tablet 650 mg  650 mg Oral Q4H PRN    acetaminophen (TYLENOL) tablet 975 mg  975 mg Oral Q6H PRN    aluminum-magnesium hydroxide-simethicone (MYLANTA) oral suspension 30 mL  30 mL Oral Q4H PRN    haloperidol lactate (HALDOL) injection 2 5 mg  2 5 mg Intramuscular Q6H PRN Max 4/day    And    LORazepam (ATIVAN) injection 1 mg  1 mg Intramuscular Q6H PRN Max 4/day    And    benztropine (COGENTIN) injection 0 5 mg  0 5 mg Intramuscular Q6H PRN Max 4/day    LORazepam (ATIVAN) injection 2 mg  2 mg Intramuscular Q4H PRN Max 4/day    And    benztropine (COGENTIN) injection 1 mg  1 mg Intramuscular Q4H PRN Max 4/day    benztropine (COGENTIN) tablet 1 mg  1 mg Oral Q6H PRN    chlorproMAZINE (THORAZINE) injection SOLN 50 mg  50 mg Intramuscular Q6H PRN    chlorproMAZINE (THORAZINE) tablet 75 mg  75 mg Oral Q6H PRN    [START ON 7/26/2022] cholecalciferol (VITAMIN D3) tablet 1,000 Units  1,000 Units Oral Daily    clonazePAM (KlonoPIN) tablet 1 mg  1 mg Oral BID    cyanocobalamin (VITAMIN B-12) tablet 1,000 mcg  1,000 mcg Oral Daily    divalproex sodium (DEPAKOTE) EC tablet 1,000 mg  1,000 mg Oral HS    ergocalciferol (VITAMIN D2) capsule 50,000 Units  50,000 Units Oral Weekly    hydrOXYzine HCL (ATARAX) tablet 25 mg  25 mg Oral Q6H PRN Max 4/day    hydrOXYzine HCL (ATARAX) tablet 50 mg  50 mg Oral Q4H PRN Max 4/day    Or    LORazepam (ATIVAN) injection 1 mg  1 mg Intramuscular Q4H PRN    lithium carbonate capsule 450 mg  450 mg Oral Daily    lithium carbonate capsule 600 mg  600 mg Oral HS    LORazepam (ATIVAN) tablet 1 mg  1 mg Oral Q6H PRN    Or    LORazepam (ATIVAN) injection 2 mg  2 mg Intramuscular Q6H PRN Max 3/day    metoprolol tartrate (LOPRESSOR) tablet 25 mg  25 mg Oral Q12H [address]    nicotine (NICODERM CQ) 7 mg/24hr TD 24 hr patch 1 patch  1 patch Transdermal Daily    nicotine polacrilex (NICORETTE) gum 2 mg  2 mg Oral Q2H PRN    nystatin (MYCOSTATIN) oral suspension 500,000 Units  500,000 Units Swish & Swallow 4x Daily    OLANZapine (ZyPREXA ZYDIS) dispersible tablet 10 mg  10 mg Oral Q3H PRN    OLANZapine (ZyPREXA ZYDIS) dispersible tablet 5 mg  5 mg Oral Q3H PRN    OLANZapine (ZyPREXA) IM injection 10 mg  10 mg Intramuscular Q2H PRN    [START ON 5/4/2022] OLANZapine (ZyPREXA) tablet 10 mg  10 mg Oral Daily    OLANZapine (ZyPREXA) tablet 15 mg  15 mg Oral Daily    polyethylene glycol (MIRALAX) packet 17 g  17 g Oral Daily PRN    risperiDONE (RisperDAL M-TAB) disintegrating tablet 4 mg  4 mg Oral BID    traZODone (DESYREL) tablet 150 mg  150 mg Oral HS PRN     Labs: I have personally reviewed all pertinent laboratory/tests results     CMP:   Lab Results   Component Value Date    SODIUM 140 04/23/2022    K 3 6 04/23/2022     04/23/2022    CO2 30 04/23/2022    AGAP 8 04/23/2022    BUN 9 04/23/2022    CREATININE 1 04 04/23/2022    GLUC 83 04/23/2022    GLUF 83 04/23/2022    CALCIUM 9 9 04/23/2022    AST 39 04/23/2022    ALT 30 04/23/2022    ALKPHOS 58 04/23/2022    TP 7 2 04/23/2022    ALB 4 7 04/23/2022    TBILI 0 88 04/23/2022    EGFR 97 04/23/2022     Lithium:   Lab Results Component Value Date    LITHIUM 0 7 05/03/2022     Counseling / Coordination of Care  Total floor / unit time spent today 25 minutes  Greater than 50% of total time was spent with the patient and / or family counseling and / or coordination of care

## 2022-05-03 NOTE — PLAN OF CARE
Problem: Ineffective Coping  Goal: Participates in unit activities  Description: Interventions:  - Provide therapeutic environment   - Provide required programming   - Redirect inappropriate behaviors   Outcome: Not Progressing        Group Goals:  Expression of Feelings and Emotions   Attendance: 1/2   Participation: minimal social interaction and participation   Mood/Affect: labile   Behaviors/Redirection: n/a

## 2022-05-03 NOTE — PROGRESS NOTES
05/03/22 0730   Activity/Group Checklist   Group   ([employer] and Processing)   Attendance Attended   Attendance Duration (min) 46-60   Interactions Interacted appropriately   Affect/Mood Appropriate   Goals Achieved Identified feelings; Discussed coping strategies; Able to listen to others; Able to engage in interactions; Able to manage/cope with feelings

## 2022-05-03 NOTE — PLAN OF CARE
Problem: Alteration in Thoughts and Perception  Goal: Treatment Goal: Gain control of psychotic behaviors/thinking, reduce/eliminate presenting symptoms and demonstrate improved reality functioning upon discharge  Outcome: Progressing  Goal: Verbalize thoughts and feelings  Description: Interventions:  - Promote a nonjudgmental and trusting relationship with the patient through active listening and therapeutic communication  - Assess patient's level of functioning, behavior and potential for risk  - Engage patient in 1 on 1 interactions  - Encourage patient to express fears, feelings, frustrations, and discuss symptoms    - [place] patient to reality, help patient recognize reality-based thinking   - Administer medications as ordered and assess for potential side effects  - Provide the patient education related to the signs and symptoms of the illness and desired effects of prescribed medications  Outcome: Progressing  Goal: Refrain from acting on delusional thinking/internal stimuli  Description: Interventions:  - Monitor patient closely, per order   - Utilize least restrictive measures   - Set reasonable limits, give positive feedback for acceptable   - Administer medications as ordered and monitor of potential side effects  Outcome: Progressing  Goal: Agree to be compliant with medication regime, as prescribed and report medication side effects  Description: Interventions:  - Offer appropriate PRN medication and supervise ingestion; conduct AIMS, as needed   Outcome: Progressing  Goal: Attend and participate in unit activities, including therapeutic, recreational, and educational groups  Description: Interventions:  -Encourage Visitation and family involvement in care  Outcome: Progressing  Goal: Recognize dysfunctional thoughts, communicate reality-based thoughts at the time of discharge  Description: Interventions:  - Provide medication and psycho-education to assist patient in compliance and developing insight into his/her illness   Outcome: Progressing  Goal: Complete daily ADLs, including personal hygiene independently, as able  Description: Interventions:  - Observe, teach, and assist patient with ADLS  - Monitor and promote a balance of rest/activity, with adequate nutrition and elimination   Outcome: Progressing     Problem: Depression  Goal: Treatment Goal: Demonstrate behavioral control of depressive symptoms, verbalize feelings of improved mood/affect, and adopt new coping skills prior to discharge  Outcome: Progressing  Goal: Verbalize thoughts and feelings  Description: Interventions:  - Assess and re-assess patient's level of risk   - Engage patient in 1:1 interactions, daily, for a minimum of 15 minutes   - Encourage patient to express feelings, fears, frustrations, hopes   Outcome: Progressing  Goal: Refrain from isolation  Description: Interventions:  - Develop a trusting relationship   - Encourage socialization   Outcome: Progressing  Goal: Refrain from self-neglect  Outcome: Progressing  Goal: Attend and participate in unit activities, including therapeutic, recreational, and educational groups  Description: Interventions:  - Provide therapeutic and educational activities daily, encourage attendance and participation, and document same in the medical record   Outcome: Progressing  Goal: Complete daily ADLs, including personal hygiene independently, as able  Description: Interventions:  - Observe, teach, and assist patient with ADLS  -  Monitor and promote a balance of rest/activity, with adequate nutrition and elimination   Outcome: Progressing

## 2022-05-03 NOTE — PROGRESS NOTES
05/03/22 0830   Team Meeting   Meeting Type Daily Rounds   Team Members Present   Team Members Present Physician;Nurse;   Physician Team Member Dr Giacomo Mccray MD; BRIONNA Muller   Nursing Team Member Anita Monae RN   Care Management Team Member MS Lucia, [facility] [facility] [place]   Patient/Family Present   Patient Present No   Patient's Family Present No   Follows redirection, loud, labile at times, pacing, wants to D/C  Reassurance and redirection, broken sleep, will trial roommate today  Lithium level today

## 2022-05-03 NOTE — NURSING NOTE
Patient compliant with meds and meals  Patient denies everything  Pleasant and cooperative  Patient required PRN thorazine and ativan twice during the shift  Patient both times became increasingly agitated but was able to verbally redirect and take meds  Patient makes states of wanting to leave and feels he no longer needs to be here  Q 7 min behavioral and safety checks in place

## 2022-05-03 NOTE — PROGRESS NOTES
05/03/22 1000   Activity/Group Checklist   Group   ([employer] Processing)   Attendance Did not attend  (AT group offered, PT elected to remain in room)

## 2022-05-03 NOTE — NURSING NOTE
Patient has been visible on the unit  Minimal interactions with peers  Patient often wanders around the unit looking dazed and confused  Tells this nurse he is tired of being here and having "the same day every day"  Says "my brain hurts" due to the other patients on the unit    No yelling or outbursts

## 2022-05-03 NOTE — PROGRESS NOTES
Progress Note - Marva Hernández 31 y o  male MRN: [id number]    Unit/Bed#: [facility] 224-01 Encounter: [id number]        Subjective:   Patient seen and examined at bedside after reviewing the chart and discussing the case with the caring staff  Patient examined at bedside  Patient has no acute complaints  Physical Exam   Vitals: Blood pressure 94/68, pulse 92, temperature 97 5 °F (36 4 °C), temperature source Temporal, resp  rate 18, height 5' 6" (1 676 m), weight 77 1 kg (170 lb), SpO2 100 %  ,Body mass index is 27 44 kg/m²  Constitutional:  Patient appears in no acute distress  HEENT: PERR, EOMI, MMM  Cardiovascular:  Regular rate, regular rhythm  Pulmonary/Chest: Effort normal and breath sounds normal    Abdomen: Soft, + BS, NT  Assessment/Plan:  Marva Hernández is a(n) 31y o  year old male with schizoaffective disorder bipolar type      1  Tobacco abuse  Patient has been put on nicotine transdermal patch 7 mg daily  2  Arthritis/headache  Patient may get Tylenol on as needed basis  3  GERD  Patient may take Mylanta as needed  4  Insomnia  Patient may get trazodone as needed  5  Vitamin-D deficiency  Patient started on vitamin D2 25390 units weekly for 14 weeks followed by vitamin D3 1000 units daily  6  Vitamin B12 deficiency  Patient started on vitamin B12 supplement  7  Tachycardia  Patient started on metoprolol tartrate 25 mg twice daily on 04/23/2022  Continue to monitor closely  8  Oral candidiasis  Patient started on nystatin swish and swallow 4 times daily for 10 days

## 2022-05-04 PROCEDURE — 99232 SBSQ HOSP IP/OBS MODERATE 35: CPT | Performed by: PSYCHIATRY & NEUROLOGY

## 2022-05-04 RX ORDER — TRAZODONE HYDROCHLORIDE 50 MG/1
50 TABLET ORAL
Status: DISCONTINUED | OUTPATIENT
Start: 2022-05-04 | End: 2022-05-09

## 2022-05-04 RX ADMIN — DIVALPROEX SODIUM 1000 MG: 500 TABLET, DELAYED RELEASE ORAL at 21:25

## 2022-05-04 RX ADMIN — LORAZEPAM 1 MG: 1 TABLET ORAL at 23:30

## 2022-05-04 RX ADMIN — LORAZEPAM 1 MG: 1 TABLET ORAL at 04:28

## 2022-05-04 RX ADMIN — LITHIUM CARBONATE 450 MG: 300 CAPSULE, GELATIN COATED ORAL at 08:11

## 2022-05-04 RX ADMIN — METOPROLOL TARTRATE 25 MG: 25 TABLET ORAL at 08:12

## 2022-05-04 RX ADMIN — CYANOCOBALAMIN TAB 500 MCG 1000 MCG: 500 TAB at 08:12

## 2022-05-04 RX ADMIN — METOPROLOL TARTRATE 25 MG: 25 TABLET ORAL at 21:25

## 2022-05-04 RX ADMIN — TRAZODONE HYDROCHLORIDE 150 MG: 150 TABLET ORAL at 21:25

## 2022-05-04 RX ADMIN — RISPERIDONE 4 MG: 2 TABLET, ORALLY DISINTEGRATING ORAL at 08:59

## 2022-05-04 RX ADMIN — OLANZAPINE 10 MG: 10 TABLET, FILM COATED ORAL at 08:11

## 2022-05-04 RX ADMIN — CHLORPROMAZINE HYDROCHLORIDE 75 MG: 25 TABLET, SUGAR COATED ORAL at 23:30

## 2022-05-04 RX ADMIN — OLANZAPINE 15 MG: 15 TABLET, FILM COATED ORAL at 17:18

## 2022-05-04 RX ADMIN — BENZTROPINE MESYLATE 1 MG: 1 TABLET ORAL at 23:30

## 2022-05-04 RX ADMIN — LITHIUM CARBONATE 600 MG: 300 CAPSULE, GELATIN COATED ORAL at 21:25

## 2022-05-04 RX ADMIN — CLONAZEPAM 1 MG: 1 TABLET ORAL at 17:18

## 2022-05-04 RX ADMIN — BENZTROPINE MESYLATE 1 MG: 1 TABLET ORAL at 11:40

## 2022-05-04 RX ADMIN — RISPERIDONE 4 MG: 2 TABLET, ORALLY DISINTEGRATING ORAL at 17:18

## 2022-05-04 RX ADMIN — CHLORPROMAZINE HYDROCHLORIDE 75 MG: 25 TABLET, SUGAR COATED ORAL at 11:40

## 2022-05-04 RX ADMIN — CLONAZEPAM 1 MG: 1 TABLET ORAL at 08:12

## 2022-05-04 NOTE — NURSING NOTE
Pt received PRN Thorazine 75mg and 1 mg cogentin for increased agitation  Will monitor for effectiveness

## 2022-05-04 NOTE — SOCIAL WORK
CM met with PT for PT check in  PT denies SI/HI/AH/VH anxiety and depression, PT requesting when D/C is  CM reviewed no D/C date at this time but that we are hopeful to get in contact with a family member to collaborate regarding his care  PT in agreement with this and indicated the only POC he has is his cousin Carrington   PT and CM placed call to his cousin, phone rang but no voicemail set up  PT agreeable to try to contact his cousin with CM again tomorrow  PT speech was disorganized, response delayed  PT indicated that he wishes to go home and take his medications, drink soda and eat dorritos  Reassurance provided

## 2022-05-04 NOTE — PROGRESS NOTES
05/04/22 0730   Activity/Group Checklist   Group   [employer])   Attendance Attended   Attendance Duration (min) 31-45   Interactions Disorganized interaction   Affect/Mood Wide   Goals Achieved Identified feelings; Able to engage in interactions

## 2022-05-04 NOTE — NURSING NOTE
Pt was visible in the milieu  Pt became agitated with other peers  Pt was medicated with Thorazine at 1140 which was mildly effective  Pt appears to be very tired but has not laid down  Pt becomes overstimulated when other peers raise their voices  Pt denies depression, anxiety, SI/HI/AVH  Pt is becoming fixated on discharge  Q 7 minute safety checks were maintained

## 2022-05-04 NOTE — PLAN OF CARE
Problem: DISCHARGE PLANNING  Goal: Discharge to home or other facility with appropriate resources  Description: INTERVENTIONS:  - Identify barriers to discharge w/patient and caregiver  - Arrange for needed discharge resources and transportation as appropriate  - Identify discharge learning needs (meds, wound care, etc )  - Refer to Case Management Department for coordinating discharge planning if the patient needs post-hospital services based on physician/advanced practitioner order or complex needs related to functional status, cognitive ability, or social support system  Outcome: Progressing   No D/C date at this time  Attempting family collaboration

## 2022-05-04 NOTE — NURSING NOTE
Patient visible in unit  Patient remains calm and cooperative  Easily redirected when verbally aggressive  Patient's speech remains tangential and mumbled  Patient disorganized at times with thought process  Patient denies SI HI AVH depression and anxiety    Patient still complains of trouble sleeping  Will continues to monitor and provide support  Q 7 minute safety and behavioral checks maintained

## 2022-05-04 NOTE — PROGRESS NOTES
05/04/22 0830   Team Meeting   Meeting Type Daily Rounds   Team Members Present   Team Members Present Physician;Nurse;   Physician Team Member Dr Isaias Robles MD; Ayala Grey, [address] [place] [address]   Nursing Team Member Mahi Gardner, [facility]   Care Management Team Member MS Lucia, [facility] [place]   Patient/Family Present   Patient Present No   Patient's Family Present No   Disorganized, tangential, denies all, lithium level reviewed, denies all, explore alt prns, difficulty sleeping, quiet room yesterday, hold on roommate, cm to try to contact family

## 2022-05-04 NOTE — PLAN OF CARE
Problem: Alteration in Thoughts and Perception  Goal: Treatment Goal: Gain control of psychotic behaviors/thinking, reduce/eliminate presenting symptoms and demonstrate improved reality functioning upon discharge  Outcome: Progressing  Goal: Verbalize thoughts and feelings  Description: Interventions:  - Promote a nonjudgmental and trusting relationship with the patient through active listening and therapeutic communication  - Assess patient's level of functioning, behavior and potential for risk  - Engage patient in 1 on 1 interactions  - Encourage patient to express fears, feelings, frustrations, and discuss symptoms    - [place] patient to reality, help patient recognize reality-based thinking   - Administer medications as ordered and assess for potential side effects  - Provide the patient education related to the signs and symptoms of the illness and desired effects of prescribed medications  Outcome: Progressing  Goal: Refrain from acting on delusional thinking/internal stimuli  Description: Interventions:  - Monitor patient closely, per order   - Utilize least restrictive measures   - Set reasonable limits, give positive feedback for acceptable   - Administer medications as ordered and monitor of potential side effects  Outcome: Progressing  Goal: Agree to be compliant with medication regime, as prescribed and report medication side effects  Description: Interventions:  - Offer appropriate PRN medication and supervise ingestion; conduct AIMS, as needed   Outcome: Progressing  Goal: Attend and participate in unit activities, including therapeutic, recreational, and educational groups  Description: Interventions:  -Encourage Visitation and family involvement in care  Outcome: Progressing  Goal: Recognize dysfunctional thoughts, communicate reality-based thoughts at the time of discharge  Description: Interventions:  - Provide medication and psycho-education to assist patient in compliance and developing insight into his/her illness   Outcome: Progressing  Goal: Complete daily ADLs, including personal hygiene independently, as able  Description: Interventions:  - Observe, teach, and assist patient with ADLS  - Monitor and promote a balance of rest/activity, with adequate nutrition and elimination   Outcome: Progressing

## 2022-05-04 NOTE — PROGRESS NOTES
Progress Note - Andra Ayala 31 y o  male MRN: [id number]    Unit/Bed#: [facility] 251-01 Encounter: [id number]        Subjective:   Patient seen and examined at bedside after reviewing the chart and discussing the case with the caring staff  Patient examined at bedside  Patient has no acute complaints  Physical Exam   Vitals: Blood pressure 117/87, pulse 102, temperature 97 5 °F (36 4 °C), temperature source Temporal, resp  rate 18, height 5' 6" (1 676 m), weight 77 1 kg (170 lb), SpO2 99 %  ,Body mass index is 27 44 kg/m²  Constitutional:  Patient appears in no acute distress  HEENT: PERR, EOMI, MMM  Cardiovascular:  Regular rate, regular rhythm  Pulmonary/Chest: Effort normal and breath sounds normal    Abdomen: Soft, + BS, NT  Assessment/Plan:  Andra Ayala is a(n) 31y o  year old male with schizoaffective disorder bipolar type      1  Tobacco abuse  Patient has been put on nicotine transdermal patch 7 mg daily  2  Arthritis/headache  Patient may get Tylenol on as needed basis  3  GERD  Patient may take Mylanta as needed  4  Insomnia  Patient may get trazodone as needed  5  Vitamin-D deficiency  Patient started on vitamin D2 19832 units weekly for 14 weeks followed by vitamin D3 1000 units daily  6  Vitamin B12 deficiency  Patient started on vitamin B12 supplement  7  Tachycardia  Patient started on metoprolol tartrate 25 mg twice daily on 04/23/2022  Continue to monitor closely  8  Oral candidiasis  Patient started on nystatin swish and swallow 4 times daily for 10 days

## 2022-05-04 NOTE — NURSING NOTE
Patient up several times thru; the night  Patient medicated early with thorazine 75mg  Patient then medicated @ [id number] with ativan 1mg po   /69 HR 88  Patient escorted to room and stayed with patient for several minutes  Will continue to monitor

## 2022-05-04 NOTE — PROGRESS NOTES
Progress Note - Behavioral Health   Henna Munoz 31 y o  male MRN: [id number]  Unit/Bed#: U 251-01 Encounter: [id number]    Assessment/Plan   Principal Problem:    Schizoaffective disorder, bipolar type ([address] )      Subjective: Patient was seen, chart reviewed and case discussed with team    Patient seen in room lying in bed  Patient is anxious and fidgety making bizarre clapping sounds with his hands  Patient soon started perseverating about discharge and wanting to go home  Patient jumped up from bed giving some sort of a posturing motion  This ended the visit  Patient was also seen out in the halls getting somewhat agitated needing to be redirected by staff  Patient's sleep is still disturbed  His appetite is adequate  Patient has been medication compliant  Patient did not verbalize thoughts to harm himself or others  Patient did not endorse having auditory or visual hallucinations however the patient does seem internally preoccupied              Psychiatric Review of Systems:  Behavior over the last 24 hours:  unchanged  Sleep:  Disturbed  Appetite:  Adequate  Medication side effects: No  ROS: no complaints, all others negative    Current Medications:  Current Facility-Administered Medications   Medication Dose Route Frequency    acetaminophen (TYLENOL) tablet 650 mg  650 mg Oral Q6H PRN    acetaminophen (TYLENOL) tablet 650 mg  650 mg Oral Q4H PRN    acetaminophen (TYLENOL) tablet 975 mg  975 mg Oral Q6H PRN    aluminum-magnesium hydroxide-simethicone (MYLANTA) oral suspension 30 mL  30 mL Oral Q4H PRN    haloperidol lactate (HALDOL) injection 2 5 mg  2 5 mg Intramuscular Q6H PRN Max 4/day    And    LORazepam (ATIVAN) injection 1 mg  1 mg Intramuscular Q6H PRN Max 4/day    And    benztropine (COGENTIN) injection 0 5 mg  0 5 mg Intramuscular Q6H PRN Max 4/day    LORazepam (ATIVAN) injection 2 mg  2 mg Intramuscular Q4H PRN Max 4/day    And    benztropine (COGENTIN) injection 1 mg  1 mg Intramuscular Q4H PRN Max 4/day    benztropine (COGENTIN) tablet 1 mg  1 mg Oral Q6H PRN    chlorproMAZINE (THORAZINE) injection SOLN 50 mg  50 mg Intramuscular Q6H PRN    chlorproMAZINE (THORAZINE) tablet 75 mg  75 mg Oral Q6H PRN    [START ON 7/26/2022] cholecalciferol (VITAMIN D3) tablet 1,000 Units  1,000 Units Oral Daily    clonazePAM (KlonoPIN) tablet 1 mg  1 mg Oral BID    cyanocobalamin (VITAMIN B-12) tablet 1,000 mcg  1,000 mcg Oral Daily    divalproex sodium (DEPAKOTE) EC tablet 1,000 mg  1,000 mg Oral HS    ergocalciferol (VITAMIN D2) capsule 50,000 Units  50,000 Units Oral Weekly    hydrOXYzine HCL (ATARAX) tablet 25 mg  25 mg Oral Q6H PRN Max 4/day    hydrOXYzine HCL (ATARAX) tablet 50 mg  50 mg Oral Q4H PRN Max 4/day    Or    LORazepam (ATIVAN) injection 1 mg  1 mg Intramuscular Q4H PRN    lithium carbonate capsule 450 mg  450 mg Oral Daily    lithium carbonate capsule 600 mg  600 mg Oral HS    LORazepam (ATIVAN) tablet 1 mg  1 mg Oral Q6H PRN    Or    LORazepam (ATIVAN) injection 2 mg  2 mg Intramuscular Q6H PRN Max 3/day    metoprolol tartrate (LOPRESSOR) tablet 25 mg  25 mg Oral Q12H JUDIE    nicotine (NICODERM CQ) 7 mg/24hr TD 24 hr patch 1 patch  1 patch Transdermal Daily    nicotine polacrilex (NICORETTE) gum 2 mg  2 mg Oral Q2H PRN    nystatin (MYCOSTATIN) oral suspension 500,000 Units  500,000 Units Swish & Swallow 4x Daily    OLANZapine (ZyPREXA ZYDIS) dispersible tablet 10 mg  10 mg Oral Q3H PRN    OLANZapine (ZyPREXA ZYDIS) dispersible tablet 5 mg  5 mg Oral Q3H PRN    OLANZapine (ZyPREXA) IM injection 10 mg  10 mg Intramuscular Q2H PRN    OLANZapine (ZyPREXA) tablet 10 mg  10 mg Oral Daily    OLANZapine (ZyPREXA) tablet 15 mg  15 mg Oral Daily    polyethylene glycol (MIRALAX) packet 17 g  17 g Oral Daily PRN    risperiDONE (RisperDAL M-TAB) disintegrating tablet 4 mg  4 mg Oral BID    traZODone (DESYREL) tablet 150 mg  150 mg Oral HS PRN       Behavioral Health Medications: all current active meds have been reviewed  Vitals:  Vitals:    05/04/22 0742   BP: 117/87   Pulse: 102   Resp: 18   Temp: 97 5 °F (36 4 °C)   SpO2: 99%       Laboratory results:    I have personally reviewed all pertinent laboratory/tests results  Most Recent Labs:   Lab Results   Component Value Date    WBC 6 26 04/23/2022    RBC 5 36 04/23/2022    HGB 16 8 04/23/2022    HCT 51 5 (H) 04/23/2022     04/23/2022    RDW 11 8 04/23/2022    NEUTROABS 4 06 04/23/2022    SODIUM 140 04/23/2022    K 3 6 04/23/2022     04/23/2022    CO2 30 04/23/2022    BUN 9 04/23/2022    CREATININE 1 04 04/23/2022    GLUC 83 04/23/2022    GLUF 83 04/23/2022    CALCIUM 9 9 04/23/2022    AST 39 04/23/2022    ALT 30 04/23/2022    ALKPHOS 58 04/23/2022    TP 7 2 04/23/2022    ALB 4 7 04/23/2022    TBILI 0 88 04/23/2022    CHOLESTEROL 113 04/23/2022    HDL 42 04/23/2022    TRIG 97 04/23/2022    LDLCALC 52 04/23/2022    NONHDLC 71 04/23/2022    VALPROICTOT 108 (H) 04/26/2022    LITHIUM 0 7 05/03/2022    [id number] 1 193 04/23/2022    HGBA1C 5 2 12/17/2020     12/17/2020       Mental Status Evaluation:  Appearance:  disheveled and Hospital attire with marginal hygiene   Behavior:  restless and fidgety and Perseverative   Speech:  Mumbled and difficult to understand at times  Mood:  anxious and dysthymic   Affect:  increased in intensity   Language Appropriate   Thought Process:  disorganized, illogical and tangential   Thought Content:  Paranoid; religiously preoccupied  Perceptual Disturbances: Patient appears to be internally preoccupied     Risk Potential: Suicidal Ideations none, Homicidal Ideations none and Potential for Aggression Yes Due to being paranoid   Sensorium:  person and place   Cognition:  recent and remote memory: unable to assess due to lack of cooperation   Consciousness:  alert and awake    Attention: Poor   Insight:  Impaired   Judgment: Impaired   Gait/Station: normal gait/station and normal balance   Motor Activity: no abnormal movements     Progress Toward Goals:  No significant improvement or changes over the past 24 hours  Recommended Treatment: Continue with pharmacotherapy, group therapy, milieu therapy and occupational therapy  1  Will add trazodone 50 mg p o  Q h s  For sleep  2  Discharge planning    Risks, benefits and possible side effects of Medications:   Patient does not verbalize understanding at this time and will require further explanation

## 2022-05-05 PROCEDURE — 99232 SBSQ HOSP IP/OBS MODERATE 35: CPT | Performed by: PSYCHIATRY & NEUROLOGY

## 2022-05-05 RX ORDER — QUETIAPINE FUMARATE 200 MG/1
400 TABLET, FILM COATED ORAL
Status: DISCONTINUED | OUTPATIENT
Start: 2022-05-07 | End: 2022-05-16

## 2022-05-05 RX ORDER — OLANZAPINE 10 MG/1
10 TABLET ORAL DAILY
Status: COMPLETED | OUTPATIENT
Start: 2022-05-05 | End: 2022-05-06

## 2022-05-05 RX ADMIN — RISPERIDONE 4 MG: 2 TABLET, ORALLY DISINTEGRATING ORAL at 17:39

## 2022-05-05 RX ADMIN — RISPERIDONE 4 MG: 2 TABLET, ORALLY DISINTEGRATING ORAL at 08:06

## 2022-05-05 RX ADMIN — LITHIUM CARBONATE 450 MG: 300 CAPSULE, GELATIN COATED ORAL at 08:06

## 2022-05-05 RX ADMIN — CHLORPROMAZINE HYDROCHLORIDE 75 MG: 25 TABLET, SUGAR COATED ORAL at 23:11

## 2022-05-05 RX ADMIN — OLANZAPINE 10 MG: 10 TABLET, FILM COATED ORAL at 17:40

## 2022-05-05 RX ADMIN — CYANOCOBALAMIN TAB 500 MCG 1000 MCG: 500 TAB at 08:06

## 2022-05-05 RX ADMIN — CHLORPROMAZINE HYDROCHLORIDE 75 MG: 25 TABLET, SUGAR COATED ORAL at 15:31

## 2022-05-05 RX ADMIN — BENZTROPINE MESYLATE 1 MG: 1 TABLET ORAL at 23:11

## 2022-05-05 RX ADMIN — OLANZAPINE 10 MG: 10 TABLET, FILM COATED ORAL at 08:06

## 2022-05-05 RX ADMIN — DIVALPROEX SODIUM 1000 MG: 500 TABLET, DELAYED RELEASE ORAL at 21:19

## 2022-05-05 RX ADMIN — BENZTROPINE MESYLATE 1 MG: 1 INJECTION INTRAMUSCULAR; INTRAVENOUS at 04:31

## 2022-05-05 RX ADMIN — LORAZEPAM 1 MG: 1 TABLET ORAL at 23:11

## 2022-05-05 RX ADMIN — CLONAZEPAM 1 MG: 1 TABLET ORAL at 17:39

## 2022-05-05 RX ADMIN — LITHIUM CARBONATE 600 MG: 300 CAPSULE, GELATIN COATED ORAL at 21:18

## 2022-05-05 RX ADMIN — QUETIAPINE FUMARATE 300 MG: 200 TABLET ORAL at 21:18

## 2022-05-05 RX ADMIN — LORAZEPAM 2 MG: 2 INJECTION INTRAMUSCULAR; INTRAVENOUS at 04:36

## 2022-05-05 RX ADMIN — METOPROLOL TARTRATE 25 MG: 25 TABLET ORAL at 08:05

## 2022-05-05 RX ADMIN — HALOPERIDOL LACTATE 2.5 MG: 5 INJECTION, SOLUTION INTRAMUSCULAR at 04:36

## 2022-05-05 RX ADMIN — TRAZODONE HYDROCHLORIDE 50 MG: 50 TABLET ORAL at 21:19

## 2022-05-05 RX ADMIN — METOPROLOL TARTRATE 25 MG: 25 TABLET ORAL at 21:19

## 2022-05-05 RX ADMIN — CLONAZEPAM 1 MG: 1 TABLET ORAL at 08:06

## 2022-05-05 NOTE — NURSING NOTE
Patient compliant with meds and meals removed patient from restraints at [address] August[address] Neil Johnson patient was able to state he wouldn't hurt others or himself  Patient was calm and cooperative  Patient denies all but at times you cant understand him speak he becomes very mumbled at times due to being so drowsy  Told patient multiple times to lay down and to try and sleep but patient cant "stay still"   Q 7 min behavioral and safety checks

## 2022-05-05 NOTE — RESTRAINT FACE TO FACE
Physical evaluation: Patient calm, drowsy, responds to voice, stable  Respirations unlabored, extremities pink and warm  Purpose for restraints: Patient became loud and verbally physically and verbally toward staff  Efforts to redirect unsuccessful  Was walking into other persons rooms and when told to go to room became physically aggressive  Patient's reaction to the intervention:  Initially combative but then passive when restrained  Patient given Ativan 2mg IM, Haldol 2 5 mg IM, Cogentin 1mg IM   Remained passive when moved to restraint board  Four point restraints applied  Extremities checked for proper tightness and circulation  Dieter Chowdhury

## 2022-05-05 NOTE — PROGRESS NOTES
05/05/22 0830   Team Meeting   Meeting Type Daily Rounds   Team Members Present   Team Members Present Physician;;Nurse   Physician Team Member Dr Shane garsia MD; Neal Padilla, [address] [place] [address]   Nursing Team Member Carli Gann, [facility]   Care Management Team Member Mag Riojas MS, [facility], [facility]   Patient/Family Present   Patient Present No   Patient's Family Present No   No sleep, poor boundaries, became physical aggressive during night placed into 4 pt restraint, down to 2 pt restraints this am  Cooperative thus far this am  Will attempt ambient noise to assist with sleep, room change

## 2022-05-05 NOTE — NUTRITION
05/05/22 1527   Biochemical Data,Medical Tests, and Procedures   Biochemical Data/Medical Tests/Procedures Lab values reviewed; Meds reviewed   Labs (Comment) No new labs   Meds (Comment) cogentin, thorazine, Vit D, klonopin, Vit B12, depakote, haldol, lopressor, zyprexa, seroquel, desyrel   Nutrition-Focused Physical Exam   Nutrition-Focused Physical Exam Findings RN skin assessment reviewed; No skin issues documented   Medical-Related Concerns PMH reviewed   Adequacy of Intake   Nutrition Modality PO   Feeding Route   PO Independent   Current PO Intake   Current Diet Order Regular diet thin liquids   Current Meal Intake %   Estimated calorie intake compared to estimated need Nutrient needs met  PES Statement   Problem Clinical   Weight (3) Unintended weight gain NC-3 4   Related to Energy intake>energy output over time   As evidenced by: Weight gain   Recommendations/Interventions   Malnutrition/BMI Present No  (does not meet criteria)   Summary Regular diet thin liquids  Meal completions mostly 100%  No supplements  4/30 170#, BMI=27 44  Admission weight 4/18 159#, 11#(7%) weight gain since admission  Skin intact  No new labs  Required restraints this am, however now no longer in place  Continue diet as prescribed, monitor weight status      Interventions/Recommendations Continue current diet order   Education Assessment   Education Patient/caregiver not appropriate for education at this time   Patient Nutrition Goals   Goal Avoid weight gain   Goal Status Initiated   Timeframe to complete goal by next f/u   Nutrition Complexity Risk   Nutrition complexity level Low risk   Nutrition review: 05/19/22   Follow up date 05/19/22

## 2022-05-05 NOTE — PROGRESS NOTES
Progress Note - Saul Santana 31 y o  male MRN: [id number]    Unit/Bed#: [facility] 251-01 Encounter: [id number]        Subjective:   Patient seen and examined at bedside after reviewing the chart and discussing the case with the caring staff  Patient examined at bedside  Patient has no acute complaints  Physical Exam   Vitals: Blood pressure 111/63, pulse 97, temperature 98 °F (36 7 °C), temperature source Temporal, resp  rate 18, height 5' 6" (1 676 m), weight 77 1 kg (170 lb), SpO2 97 %  ,Body mass index is 27 44 kg/m²  Constitutional:  Patient appears in no acute distress  HEENT: PERR, EOMI, MMM  Cardiovascular:  Regular rate, regular rhythm  Pulmonary/Chest: Effort normal and breath sounds normal    Abdomen: Soft, + BS, NT  Assessment/Plan:  Saul Santana is a(n) 31y o  year old male with schizoaffective disorder bipolar type      1  Tobacco abuse  Patient has been put on nicotine transdermal patch 7 mg daily  2  Arthritis/headache  Patient may get Tylenol on as needed basis  3  GERD  Patient may take Mylanta as needed  4  Insomnia  Patient may get trazodone as needed  5  Vitamin-D deficiency  Patient started on vitamin D2 79588 units weekly for 14 weeks followed by vitamin D3 1000 units daily  6  Vitamin B12 deficiency  Patient started on vitamin B12 supplement  7  Tachycardia  Patient started on metoprolol tartrate 25 mg twice daily on 04/23/2022  Continue to monitor closely  The patient was discussed with Dr Kingsley Nickerson and he is in agreement with the above note

## 2022-05-05 NOTE — PLAN OF CARE
Problem: Alteration in Thoughts and Perception  Goal: Treatment Goal: Gain control of psychotic behaviors/thinking, reduce/eliminate presenting symptoms and demonstrate improved reality functioning upon discharge  Outcome: Progressing  Goal: Verbalize thoughts and feelings  Description: Interventions:  - Promote a nonjudgmental and trusting relationship with the patient through active listening and therapeutic communication  - Assess patient's level of functioning, behavior and potential for risk  - Engage patient in 1 on 1 interactions  - Encourage patient to express fears, feelings, frustrations, and discuss symptoms    - [place] patient to reality, help patient recognize reality-based thinking   - Administer medications as ordered and assess for potential side effects  - Provide the patient education related to the signs and symptoms of the illness and desired effects of prescribed medications  Outcome: Progressing  Goal: Refrain from acting on delusional thinking/internal stimuli  Description: Interventions:  - Monitor patient closely, per order   - Utilize least restrictive measures   - Set reasonable limits, give positive feedback for acceptable   - Administer medications as ordered and monitor of potential side effects  Outcome: Progressing  Goal: Agree to be compliant with medication regime, as prescribed and report medication side effects  Description: Interventions:  - Offer appropriate PRN medication and supervise ingestion; conduct AIMS, as needed   Outcome: Progressing  Goal: Attend and participate in unit activities, including therapeutic, recreational, and educational groups  Description: Interventions:  -Encourage Visitation and family involvement in care  Outcome: Progressing  Goal: Recognize dysfunctional thoughts, communicate reality-based thoughts at the time of discharge  Description: Interventions:  - Provide medication and psycho-education to assist patient in compliance and developing insight into his/her illness   Outcome: Progressing  Goal: Complete daily ADLs, including personal hygiene independently, as able  Description: Interventions:  - Observe, teach, and assist patient with ADLS  - Monitor and promote a balance of rest/activity, with adequate nutrition and elimination   Outcome: Progressing     Problem: Risk for Violence/Aggression Toward Others  Goal: Verbalize thoughts and feelings  Description: Interventions:  - Assess and re-assess patient's level of risk, every waking shift  - Engage patient in 1:1 interactions, daily, for a minimum of 15 minutes   - Allow patient to express feelings and frustrations in a safe and non-threatening manner   - Establish rapport/trust with patient   Outcome: Progressing  Goal: Refrain from harming others  Outcome: Progressing  Goal: Control angry outbursts  Description: Interventions:  - Monitor patient closely, per order  - Ensure early verbal de-escalation  - Monitor prn medication needs  - Set reasonable/therapeutic limits, outline behavioral expectations, and consequences   - Provide a non-threatening milieu, utilizing the least restrictive interventions   Outcome: Progressing

## 2022-05-05 NOTE — PROGRESS NOTES
Progress Note - Behavioral Health   Henna Munoz 31 y o  male MRN: @MRN   Unit/Bed#: [facility] 251-01 Encounter: [id number]      Report from staff regarding this patient received and discussed, and records reviewed prior to seeing this patient  Behavior over the last 24 hours: limited improvement  The patient was not angry agitated but disorganized with rambling speech at times, visibly sedated he is in the daytime  Because of his disorganization of thoughts her could not provide consistent information about his feelings  Patient remains bizarre, calm, confused, disorganized and distracted today  Sleep: insomnia  Appetite: fair  Medication side effects: No     Mental Status Evaluation:    Appearance:  disheveled   Mood:  dysphoric, anxious   Affect: constricted    Speech:  decreased rate, slow, decreased volume, dysarthric   Thought Content:  paranoid ideation, negative thinking, poverty of thought   Perceptual Disturbances: does not appear responding to internal stimuli   Risk Potential: Suicidal ideation - contracts for safety on the unit, unable to assess  Homicidal ideation - unable to assess  Potential for aggression - Yes, due to acute psychosis   Insight:  poor   Judgment: poor   Motor Activity: no abnormal movements         Laboratory results:  I have personally reviewed all pertinent laboratory results      Progress Toward Goals: no significant improvement    Assessment/Plan   Principal Problem:    Schizoaffective disorder, bipolar type (HCC)    Recommended Treatment:   Because of the patient significant sedation in the daytime and profound lack of sleep at night will make medication adjustment, will stop morning Zyprexa, start nighttime Seroquel that was selected because Seroquel was more sleep promoting anti psychotic, will be continue to taper Zyprexa and increase Seroquel at night while continuing risperidone at high dose the because that dose of risperidone help the patient to regain control over his mood and his mind in the past with resolving paranoid delusional believes  Planned medication and treatment changes: All current active medications have been reviewed  Continue treatment with group therapy, milieu therapy, occupational therapy and medication management  ** Please Note: This note has been constructed using a voice recognition system  **      BMP: No results for input(s): NA, K, CL, CO2, BUN in the last 72 hours      Invalid input(s): CREA, GLU  Vitals:    05/05/22 1506   BP: 103/74   Pulse: 103   Resp: 18   Temp: 98 3 °F (36 8 °C)   SpO2: 97%        Medication Administration - last 24 hours from 05/04/2022 1949 to 05/05/2022 1949       Date/Time Order Dose Route Action Action by     05/05/2022 0504 LORazepam (ATIVAN) tablet 1 mg   Oral See Alternative [facility], RN     05/04/2022 2330 LORazepam (ATIVAN) tablet 1 mg 1 mg Oral Given [facility], RN     05/05/2022 0504 LORazepam (ATIVAN) injection 2 mg 2 mg Intramuscular Not Given [facility], RN     05/04/2022 2330 LORazepam (ATIVAN) injection 2 mg   Intramuscular See Alternative [facility], RN     05/05/2022 6107 nicotine (NICODERM CQ) 7 mg/24hr TD 24 hr patch 1 patch 1 patch Transdermal Not Given [facility]     05/05/2022 0436 haloperidol lactate (HALDOL) injection 2 5 mg 2 5 mg Intramuscular Given [facility], RN     05/05/2022 0436 LORazepam (ATIVAN) injection 2 mg 2 mg Intramuscular Given [facility], RN     05/05/2022 0431 benztropine (COGENTIN) injection 1 mg 1 mg Intramuscular Given [facility], RN     05/04/2022 2330 benztropine (COGENTIN) tablet 1 mg 1 mg Oral Given [facility], RN     05/05/2022 1531 chlorproMAZINE (THORAZINE) tablet 75 mg 75 mg Oral Given Alley White LPN     74/72/3011 1023 chlorproMAZINE (THORAZINE) tablet 75 mg 75 mg Oral Given [facility] RN     05/04/2022 2125 divalproex sodium (DEPAKOTE) EC tablet 1,000 mg 1,000 mg Oral Given Wei Shine, GIA     05/05/2022 4767 metoprolol tartrate (LOPRESSOR) tablet 25 mg 25 mg Oral Given [facility]     05/04/2022 2125 metoprolol tartrate (LOPRESSOR) tablet 25 mg 25 mg Oral Given Enon Joshua RN     05/05/2022 6795 cyanocobalamin (VITAMIN B-12) tablet 1,000 mcg 1,000 mcg Oral Given [facility]     05/05/2022 1739 risperiDONE (RisperDAL M-TAB) disintegrating tablet 4 mg 4 mg Oral Given [facility]     05/05/2022 0806 risperiDONE (RisperDAL M-TAB) disintegrating tablet 4 mg 4 mg Oral Given [facility]     05/05/2022 1739 clonazePAM (KlonoPIN) tablet 1 mg 1 mg Oral Given [facility]     05/05/2022 0806 clonazePAM (KlonoPIN) tablet 1 mg 1 mg Oral Given [facility]     05/05/2022 0806 lithium carbonate capsule 450 mg 450 mg Oral Given [facility]     05/04/2022 2125 lithium carbonate capsule 600 mg 600 mg Oral Given Enon Joshua RN     05/05/2022 0806 OLANZapine (ZyPREXA) tablet 10 mg 10 mg Oral Given [facility]     05/04/2022 2125 traZODone (DESYREL) tablet 150 mg 150 mg Oral Given Enon Joshua RN     05/04/2022 2300 traZODone (DESYREL) tablet 50 mg 50 mg Oral Not Given Enon Joshua RN     05/05/2022 1740 OLANZapine (ZyPREXA) tablet 10 mg 10 mg [facility]

## 2022-05-05 NOTE — NURSING NOTE
Pt was given PRN PO Thorazine 75mg for severe agitation  Pt became agitated when the unit became overstimulating  Pt was told to try going to his room to reduce stimulation  Will monitor for effectiveness

## 2022-05-05 NOTE — TREATMENT TEAM
05/05/22 1506   Vital Signs   Temperature 98 3 °F (36 8 °C)   Temp Source Temporal   Pulse 103   Respirations 18   Blood Pressure 103/74   MAP (mmHg) 87   BP Location Left arm   BP Method Automatic   Patient Position - Orthostatic VS Standing

## 2022-05-06 PROCEDURE — 99232 SBSQ HOSP IP/OBS MODERATE 35: CPT | Performed by: NURSE PRACTITIONER

## 2022-05-06 RX ORDER — LITHIUM CARBONATE 300 MG/1
300 CAPSULE ORAL DAILY
Status: DISCONTINUED | OUTPATIENT
Start: 2022-05-07 | End: 2022-05-07

## 2022-05-06 RX ADMIN — LITHIUM CARBONATE 600 MG: 300 CAPSULE, GELATIN COATED ORAL at 21:01

## 2022-05-06 RX ADMIN — QUETIAPINE FUMARATE 300 MG: 200 TABLET ORAL at 21:01

## 2022-05-06 RX ADMIN — RISPERIDONE 4 MG: 2 TABLET, ORALLY DISINTEGRATING ORAL at 18:18

## 2022-05-06 RX ADMIN — LITHIUM CARBONATE 450 MG: 300 CAPSULE, GELATIN COATED ORAL at 10:00

## 2022-05-06 RX ADMIN — CYANOCOBALAMIN TAB 500 MCG 1000 MCG: 500 TAB at 10:00

## 2022-05-06 RX ADMIN — CLONAZEPAM 1 MG: 1 TABLET ORAL at 18:19

## 2022-05-06 RX ADMIN — CLONAZEPAM 1 MG: 1 TABLET ORAL at 10:00

## 2022-05-06 RX ADMIN — OLANZAPINE 10 MG: 10 TABLET, FILM COATED ORAL at 10:00

## 2022-05-06 RX ADMIN — DIVALPROEX SODIUM 1000 MG: 500 TABLET, DELAYED RELEASE ORAL at 21:01

## 2022-05-06 RX ADMIN — TRAZODONE HYDROCHLORIDE 50 MG: 50 TABLET ORAL at 21:01

## 2022-05-06 RX ADMIN — METOPROLOL TARTRATE 25 MG: 25 TABLET ORAL at 21:02

## 2022-05-06 RX ADMIN — METOPROLOL TARTRATE 25 MG: 25 TABLET ORAL at 10:00

## 2022-05-06 RX ADMIN — RISPERIDONE 4 MG: 2 TABLET, ORALLY DISINTEGRATING ORAL at 10:00

## 2022-05-06 NOTE — PROGRESS NOTES
Progress Note - Behavioral Health   Luis Armando Hernandez 31 y o  male MRN: [id number]  Unit/Bed#: U 251-01 Encounter: [id number]    Assessment/Plan   Principal Problem:    Schizoaffective disorder, bipolar type ([address] )      Behavior over the last 24 hours:  unchanged  Sleep:  Poor  Appetite: normal  Medication side effects:  Yes, sedation, dry mouth  ROS: Dry mouth and all other systems are negative    Guillermina Fu was seen today for psychiatric follow-up  He was calm and visibly sedated  He continues to experience poor sleep and asked, what's the game plan?" Speech was mumbled and incoherent at times  We discussed plan to taper lithium to discontinuation and no discharge date has been established yet; patient took news rather well  He denied any anxiety or depression but endorsed frustration with "being here    He remains compliant with medications and meals  Patient has been doing well off of 1:1 observation and is able to make needs known  He has had no episodes of agitation or aggression over the past few days  Mental Status Evaluation:  Appearance:  disheveled   Behavior:  Cooperative, redirectable   Speech:  Mumbled, incoherent at times   Mood:  decreased range   Affect:  blunted   Thought Process:  disorganized   Thought Content:  Less paranoid   Perceptual Disturbances: Denies AVH, did not appear internally preoccupied   Risk Potential: Suicidal Ideations none  Homicidal Ideations none  Potential for Aggression No   Sensorium:  person and place   Memory:  recent and remote memory: unable to assess due to lack of cooperation   Consciousness:  sedated    Attention: attention span appeared shorter than expected for age   Insight:  Impaired   Judgment: Impaired   Gait/Station: normal balance and slow   Motor Activity: no abnormal movements     Progress Toward Goals:  Unchanged  Thoughts remain disorganized, but less paranoid    Exhibiting no symptoms of mood lability or eliecer; decision made to begin tapering lithium to discontinuation and continue with antipsychotic therapy for disorganization and psychosis  Will decrease lithium 300 mg PO QD with plan to further taper by 150 mg daily until only nightly dosing remains, at that point will further decrease administration to QOD until discontinued  Continue remainder psychotropic regimen as ordered  No discharge date at this time  Recommended Treatment: Continue with group therapy, milieu therapy and occupational therapy  Risks, benefits and possible side effects of Medications:   Patient does not verbalize understanding at this time and will require further explanation      Medications:   Decrease lithium 300 mg PO QD (0900)  all current active meds have been reviewed and current meds:   Current Facility-Administered Medications   Medication Dose Route Frequency    acetaminophen (TYLENOL) tablet 650 mg  650 mg Oral Q6H PRN    acetaminophen (TYLENOL) tablet 650 mg  650 mg Oral Q4H PRN    acetaminophen (TYLENOL) tablet 975 mg  975 mg Oral Q6H PRN    aluminum-magnesium hydroxide-simethicone (MYLANTA) oral suspension 30 mL  30 mL Oral Q4H PRN    haloperidol lactate (HALDOL) injection 2 5 mg  2 5 mg Intramuscular Q6H PRN Max 4/day    And    LORazepam (ATIVAN) injection 1 mg  1 mg Intramuscular Q6H PRN Max 4/day    And    benztropine (COGENTIN) injection 0 5 mg  0 5 mg Intramuscular Q6H PRN Max 4/day    LORazepam (ATIVAN) injection 2 mg  2 mg Intramuscular Q4H PRN Max 4/day    And    benztropine (COGENTIN) injection 1 mg  1 mg Intramuscular Q4H PRN Max 4/day    benztropine (COGENTIN) tablet 1 mg  1 mg Oral Q6H PRN    chlorproMAZINE (THORAZINE) injection SOLN 50 mg  50 mg Intramuscular Q6H PRN    chlorproMAZINE (THORAZINE) tablet 75 mg  75 mg Oral Q6H PRN    [START ON 7/26/2022] cholecalciferol (VITAMIN D3) tablet 1,000 Units  1,000 Units Oral Daily    clonazePAM (KlonoPIN) tablet 1 mg  1 mg Oral BID    cyanocobalamin (VITAMIN B-12) tablet 1,000 mcg  1,000 mcg Oral Daily    divalproex sodium (DEPAKOTE) EC tablet 1,000 mg  1,000 mg Oral HS    ergocalciferol (VITAMIN D2) capsule 50,000 Units  50,000 Units Oral Weekly    hydrOXYzine HCL (ATARAX) tablet 25 mg  25 mg Oral Q6H PRN Max 4/day    hydrOXYzine HCL (ATARAX) tablet 50 mg  50 mg Oral Q4H PRN Max 4/day    Or    LORazepam (ATIVAN) injection 1 mg  1 mg Intramuscular Q4H PRN    [START ON 5/7/2022] lithium carbonate capsule 300 mg  300 mg Oral Daily    lithium carbonate capsule 600 mg  600 mg Oral HS    LORazepam (ATIVAN) tablet 1 mg  1 mg Oral Q6H PRN    Or    LORazepam (ATIVAN) injection 2 mg  2 mg Intramuscular Q6H PRN Max 3/day    metoprolol tartrate (LOPRESSOR) tablet 25 mg  25 mg Oral Q12H [address]    nicotine (NICODERM CQ) 7 mg/24hr TD 24 hr patch 1 patch  1 patch Transdermal Daily    nicotine polacrilex (NICORETTE) gum 2 mg  2 mg Oral Q2H PRN    OLANZapine (ZyPREXA ZYDIS) dispersible tablet 10 mg  10 mg Oral Q3H PRN    OLANZapine (ZyPREXA ZYDIS) dispersible tablet 5 mg  5 mg Oral Q3H PRN    OLANZapine (ZyPREXA) IM injection 10 mg  10 mg Intramuscular Q2H PRN    polyethylene glycol (MIRALAX) packet 17 g  17 g Oral Daily PRN    QUEtiapine (SEROquel) tablet 300 mg  300 mg Oral HS    [START ON 5/7/2022] QUEtiapine (SEROquel) tablet 400 mg  400 mg Oral HS    risperiDONE (RisperDAL M-TAB) disintegrating tablet 4 mg  4 mg Oral BID    traZODone (DESYREL) tablet 150 mg  150 mg Oral HS PRN    traZODone (DESYREL) tablet 50 mg  50 mg Oral HS     Labs: I have personally reviewed all pertinent laboratory/tests results     CMP:   Lab Results   Component Value Date    SODIUM 140 04/23/2022    K 3 6 04/23/2022     04/23/2022    CO2 30 04/23/2022    AGAP 8 04/23/2022    BUN 9 04/23/2022    CREATININE 1 04 04/23/2022    GLUC 83 04/23/2022    GLUF 83 04/23/2022    CALCIUM 9 9 04/23/2022    AST 39 04/23/2022    ALT 30 04/23/2022    ALKPHOS 58 04/23/2022    TP 7 2 04/23/2022    ALB 4 7 04/23/2022    TBILI 0 88 04/23/2022    EGFR 97 04/23/2022     Depakote:   Lab Results   Component Value Date    VALPROICTOT 108 (H) 04/26/2022     Lithium:   Lab Results   Component Value Date    LITHIUM 0 7 05/03/2022     Counseling / Coordination of Care  Total floor / unit time spent today 25 minutes  Greater than 50% of total time was spent with the patient and / or family counseling and / or coordination of care   A description of the counseling / coordination of care:  Medication education, treatment plan, supportive therapy

## 2022-05-06 NOTE — NURSING NOTE
Pt was observed walking the hallways and at the nurse's station  Pt appears to be very drowsy and slurring words  Pt was redirected to his bedroom where he used his tablet to listen to music as he fell asleep  Pt was compliant with and tolerated all medications without complaint  Will continue to support and monitor with Q7 checks

## 2022-05-06 NOTE — PROGRESS NOTES
05/06/22 0830   Team Meeting   Meeting Type Daily Rounds   Team Members Present   Team Members Present Physician;Nurse;   Physician Team Member Dr Dusty Mckay MD; Yenni Cohen, [address] [place] [address]   Nursing Team Member Stephan Renee, G[facility]   Care Management Team Member Benson Vogt MS, [facility], [address] [facility]   Patient/Family Present   Patient Present No   Patient's Family Present No   Slept 4-5 hours with music, no prns overnight, multiple prns during day, unsteady on feet, confused, medication adjustment, titrate off lithium, reviewed levels

## 2022-05-06 NOTE — PROGRESS NOTES
Progress Note - Stephania Will 31 y o  male MRN: [id number]    Unit/Bed#: [facility] 251-01 Encounter: [id number]        Subjective:   Patient seen and examined at bedside after reviewing the chart and discussing the case with the caring staff  Patient examined at bedside  Patient has no acute complaints  Physical Exam   Vitals: Blood pressure 113/76, pulse 100, temperature 97 9 °F (36 6 °C), temperature source Temporal, resp  rate 18, height 5' 6" (1 676 m), weight 77 1 kg (170 lb), SpO2 98 %  ,Body mass index is 27 44 kg/m²  Constitutional:  Patient appears in no acute distress  HEENT: PERR, EOMI, MMM  Cardiovascular:  Regular rate, regular rhythm  Pulmonary/Chest: Effort normal and breath sounds normal    Abdomen: Soft, + BS, NT  Assessment/Plan:  Stephania Will is a(n) 31y o  year old male with schizoaffective disorder bipolar type      1  Tobacco abuse  Patient has been put on nicotine transdermal patch 7 mg daily  2  Arthritis/headache  Patient may get Tylenol on as needed basis  3  GERD  Patient may take Mylanta as needed  4  Insomnia  Patient may get trazodone as needed  5  Vitamin-D deficiency  Patient started on vitamin D2 66699 units weekly for 14 weeks followed by vitamin D3 1000 units daily  6  Vitamin B12 deficiency  Patient started on vitamin B12 supplement  7  Tachycardia  Patient started on metoprolol tartrate 25 mg twice daily on 04/23/2022  Continue to monitor closely  The patient was discussed with Dr Jeni Courtney and he is in agreement with the above note

## 2022-05-06 NOTE — PLAN OF CARE

## 2022-05-07 PROCEDURE — 99232 SBSQ HOSP IP/OBS MODERATE 35: CPT

## 2022-05-07 RX ORDER — LITHIUM CARBONATE 150 MG/1
150 CAPSULE ORAL DAILY
Status: DISCONTINUED | OUTPATIENT
Start: 2022-05-08 | End: 2022-05-08

## 2022-05-07 RX ADMIN — LITHIUM CARBONATE 300 MG: 300 CAPSULE, GELATIN COATED ORAL at 08:05

## 2022-05-07 RX ADMIN — CLONAZEPAM 1 MG: 1 TABLET ORAL at 08:01

## 2022-05-07 RX ADMIN — LITHIUM CARBONATE 600 MG: 300 CAPSULE, GELATIN COATED ORAL at 21:32

## 2022-05-07 RX ADMIN — METOPROLOL TARTRATE 25 MG: 25 TABLET ORAL at 21:32

## 2022-05-07 RX ADMIN — METOPROLOL TARTRATE 25 MG: 25 TABLET ORAL at 08:01

## 2022-05-07 RX ADMIN — RISPERIDONE 4 MG: 2 TABLET, ORALLY DISINTEGRATING ORAL at 08:01

## 2022-05-07 RX ADMIN — QUETIAPINE FUMARATE 400 MG: 200 TABLET ORAL at 21:32

## 2022-05-07 RX ADMIN — TRAZODONE HYDROCHLORIDE 50 MG: 50 TABLET ORAL at 21:32

## 2022-05-07 RX ADMIN — RISPERIDONE 4 MG: 2 TABLET, ORALLY DISINTEGRATING ORAL at 18:25

## 2022-05-07 RX ADMIN — CYANOCOBALAMIN TAB 500 MCG 1000 MCG: 500 TAB at 08:01

## 2022-05-07 RX ADMIN — CLONAZEPAM 1 MG: 1 TABLET ORAL at 18:25

## 2022-05-07 RX ADMIN — ACETAMINOPHEN 650 MG: 325 TABLET ORAL at 21:51

## 2022-05-07 RX ADMIN — CHLORPROMAZINE HYDROCHLORIDE 75 MG: 25 TABLET, SUGAR COATED ORAL at 16:14

## 2022-05-07 RX ADMIN — DIVALPROEX SODIUM 1000 MG: 500 TABLET, DELAYED RELEASE ORAL at 21:32

## 2022-05-07 NOTE — PROGRESS NOTES
Progress Note - Behavioral Health   Janes Martinez 31 y o  male MRN: [id number]  Unit/Bed#: U 251-01 Encounter: [id number]    Assessment/Plan   Principal Problem:    Schizoaffective disorder, bipolar type ([address] )      Subjective:Patient was seen today for continuation of care, records reviewed and  patient was discussed with the morning case review team  No behavioral changes in the past 24 hours  Patient remains calm and pleasant, appears tired from lack of sleep, reports about 4 hours of sleep last night  Mumbled speech and was difficult to understand during today's encounter due to disorganization in thoughts  Understanding of the lithium taper and morning dose decreased to 150 mg as planned per primary treatment team  He denies feeling anxious or depressed  Denies hallucinations  Patient denies endorsing any suicidal or homicidal ideation  Remains medication compliance  Denies any side effects from medications  VPA: 108 on 4/26, Li: 0 7 on 5/3      Psychiatric Review of Systems:    Sleep: poor  Appetite: normal  Medication side effects: No   ROS: all other systems are negative    Vitals:  Vitals:    05/07/22 0746   BP: 122/85   Pulse: 97   Resp: 18   Temp: 97 6 °F (36 4 °C)   SpO2: 97%       Mental Status Evaluation:    Appearance:  disheveled   Behavior:  cooperative, calm   Speech:  mumbled   Mood:  dysphoric   Affect:  blunted   Thought Process:  disorganized   Associations: concrete associations   Thought Content:  no overt delusions   Perceptual Disturbances: denies auditory hallucinations when asked   Risk Potential: Suicidal ideation - None at present  Homicidal ideation - None at present  Potential for aggression - No   Sensorium:  oriented to person, place and time/date   Memory:  recent and remote memory grossly intact   Consciousness:  alert and awake   Attention: attention span and concentration appear shorter than expected for age   Insight:  impaired   Judgment: impaired   Gait/Station: normal gait/station   Motor Activity: no abnormal movements     Laboratory results:    I have personally reviewed all pertinent laboratory/tests results  Depakote:   Lab Results   Component Value Date    VALPROICTOT 108 (H) 04/26/2022     Lithium:   Lab Results   Component Value Date    LITHIUM 0 7 05/03/2022       Progress Toward Goals:     Unchanged    Recommended Treatment:   Will decrease lithium to 150 mg p o  QD per primary treatment team, will plan to taper down by 150mg per day       All current active medications have been reviewed  Encourage group therapy, milieu therapy and occupational therapy  Continue treatment with group therapy, milieu therapy and occupational therapy  Behavioral Health checks every 7 minutes  Continue all other medications:  Current Facility-Administered Medications   Medication Dose Route Frequency Provider Last Rate    acetaminophen  650 mg Oral Q6H PRN [place] Medei, CRNP      acetaminophen  650 mg Oral Q4H PRN [place] Medei, CRNP      acetaminophen  975 mg Oral Q6H PRN [place] Medei, CRNP      aluminum-magnesium hydroxide-simethicone  30 mL Oral Q4H PRN [place] Medei, CRNP      haloperidol lactate  2 5 mg Intramuscular Q6H PRN Max 4/day Ivanna M Medei, CRNP      And    LORazepam  1 mg Intramuscular Q6H PRN Max 4/day [place] Medei, CRNP      And    benztropine  0 5 mg Intramuscular Q6H PRN Max 4/day Ivanna M Medei, CRNP      LORazepam  2 mg Intramuscular Q4H PRN Max 4/day [place] Medei, CRNP      And    benztropine  1 mg Intramuscular Q4H PRN Max 4/day [place] Medei, CRNP      benztropine  1 mg Oral Q6H PRN [place] Medei, CRNP      chlorproMAZINE  50 mg Intramuscular Q6H PRN Myrtis Lundborg, MD      chlorproMAZINE  75 mg Oral Q6H PRN Myrtis Lundborg, MD      [START ON 7/26/2022] cholecalciferol  1,000 Units Oral Daily Shanice Garcia PA-C      clonazePAM  1 mg Oral BID [place] Medei, CRNP      cyanocobalamin  1,000 mcg Oral Daily Shanice Garcia PA-C      divalproex sodium  1,000 mg Oral HS Rock Caceres MD      ergocalciferol  50,000 Units Oral Weekly Shanice Garcia PA-C      hydrOXYzine HCL  25 mg Oral Q6H PRN Max 4/day Bethesda Gut Medei, CRNP      hydrOXYzine HCL  50 mg Oral Q4H PRN Max 4/day Tata Gut Medei, CRALFREDITO      Or    LORazepam  1 mg Intramuscular Q4H PRN [place]da Gut Medei, CRNP      [START ON 5/8/2022] lithium carbonate  150 mg Oral Daily [place], DELILAHNP      lithium carbonate  600 mg Oral HS Bethesda Gut Medei, CRNP      LORazepam  1 mg Oral Q6H PRN Bethesda Gut Medei, CRALFREDITO      Or    LORazepam  2 mg Intramuscular Q6H PRN Max 3/day [place]da Gut Medei, CRNP      metoprolol tartrate  25 mg Oral Q12H [address] Shanice Garcia PA-C      nicotine  1 patch Transdermal Daily Tata Gut Medei, BRIONNA      nicotine polacrilex  2 mg Oral Q2H PRN Shanice Garcia PA-C      OLANZapine  10 mg Oral Q3H PRN Rock Caceres MD      OLANZapine  5 mg Oral Q3H PRN Rock Caceres MD      OLANZapine  10 mg Intramuscular Q2H PRN Rock Caceres MD      polyethylene glycol  17 g Oral Daily PRN Tata Gut Medei, CRNP      QUEtiapine  400 mg Oral HS Rock Caceres MD      risperiDONE  4 mg Oral BID Tata Gut Medei, BRIONNA      traZODone  150 mg Oral HS PRN Tata Gut Medei, BRIONNA      traZODone  50 mg Oral HS Gerald Alba DO         Risks / Benefits of Treatment:     Risks, benefits, and possible side effects of medications explained to patient  Patient has limited understanding of risks and benefits of treatment at this time, but agrees to take medications as prescribed  Counseling / Coordination of Care:     Patient's progress reviewed with nursing staff  Medications, treatment progress and treatment plan reviewed with patient  Supportive counseling provided to the patient            BRIONNA Stubbs

## 2022-05-07 NOTE — NURSING NOTE
Patient visible on the unit, flat affect,drowsy,midly confused, pleasant , calm and cooperative  He denies all needs except his night medications Cristianounnadeem denies suicidal ideation, A/V/H  , Lawrence was observed walking the halls, social with staff for needs and selective peers  no acute behaviors  Patient good with  Medication, fluid and nourishment

## 2022-05-07 NOTE — NURSING NOTE
Patient observed with broken  sleep through out the night per rounds, observed in the halls, confused at times attempting to walk into his peers rooms    No sign of distress, no acute behaviors, he remained quiet while awake

## 2022-05-08 PROCEDURE — 99232 SBSQ HOSP IP/OBS MODERATE 35: CPT

## 2022-05-08 RX ADMIN — BENZTROPINE MESYLATE 1 MG: 1 INJECTION INTRAMUSCULAR; INTRAVENOUS at 03:20

## 2022-05-08 RX ADMIN — NICOTINE POLACRILEX 2 MG: 2 GUM, CHEWING BUCCAL at 17:17

## 2022-05-08 RX ADMIN — CYANOCOBALAMIN TAB 500 MCG 1000 MCG: 500 TAB at 07:49

## 2022-05-08 RX ADMIN — RISPERIDONE 4 MG: 2 TABLET, ORALLY DISINTEGRATING ORAL at 07:49

## 2022-05-08 RX ADMIN — DIVALPROEX SODIUM 1000 MG: 500 TABLET, DELAYED RELEASE ORAL at 20:45

## 2022-05-08 RX ADMIN — QUETIAPINE FUMARATE 400 MG: 200 TABLET ORAL at 20:45

## 2022-05-08 RX ADMIN — LITHIUM CARBONATE 600 MG: 300 CAPSULE, GELATIN COATED ORAL at 20:45

## 2022-05-08 RX ADMIN — RISPERIDONE 4 MG: 2 TABLET, ORALLY DISINTEGRATING ORAL at 17:55

## 2022-05-08 RX ADMIN — CLONAZEPAM 1 MG: 1 TABLET ORAL at 07:52

## 2022-05-08 RX ADMIN — METOPROLOL TARTRATE 25 MG: 25 TABLET ORAL at 07:49

## 2022-05-08 RX ADMIN — LITHIUM CARBONATE 150 MG: 150 CAPSULE, GELATIN COATED ORAL at 07:51

## 2022-05-08 RX ADMIN — METOPROLOL TARTRATE 25 MG: 25 TABLET ORAL at 20:45

## 2022-05-08 RX ADMIN — CLONAZEPAM 1 MG: 1 TABLET ORAL at 17:55

## 2022-05-08 RX ADMIN — LORAZEPAM 2 MG: 2 INJECTION INTRAMUSCULAR; INTRAVENOUS at 03:19

## 2022-05-08 RX ADMIN — TRAZODONE HYDROCHLORIDE 50 MG: 50 TABLET ORAL at 20:45

## 2022-05-08 RX ADMIN — POLYETHYLENE GLYCOL 3350 17 G: 17 POWDER, FOR SOLUTION ORAL at 21:30

## 2022-05-08 RX ADMIN — HALOPERIDOL LACTATE 2.5 MG: 5 INJECTION, SOLUTION INTRAMUSCULAR at 03:19

## 2022-05-08 NOTE — NURSING NOTE
Patient up multiple times throughout the night  Confused and agitated when he wakes  Mumbles and does not make sense  Patient was redirected back to his room each time  Minimal success  Patient would stay in his room for brief periods and then come back out yelling  Around 0300 patient came out loud and demanding a cigarette  Demanding to leave  Exit seeking  Set off door alarm at fire escape  Unable to redirect patient  Unwilling to take po medications or sit in room 243  [facility] and patient assisted back to his room  Medication effective  Patient able to relax and currently appears to be sleeping

## 2022-05-08 NOTE — NURSING NOTE
Behaviors remain disorganized  Mildly confused  Unable to relax  Restless  Easily agitates self  Compliant with medications as prescribed and mouth checks    Safety precautions maintained  Will continue to monitor and assess

## 2022-05-08 NOTE — NURSING NOTE
Pt visible on unit  Disheveled  Disorganized  Confused  Active hallucinations  Pt speaking to something that is not visible to others  Pt pushing on fire alarm initially at start of shift  Became agitated with redirection  Observed yelling at the door "stop screaming" Pt encouraged to go to room 243 to decrease stimulation  Intervention effective as patient able to calm down  Compliant with scheduled medications as prescribed  Safety precautions maintained  Will continue to monitor and assess

## 2022-05-08 NOTE — NURSING NOTE
Patient having a difficult time this evening  He has been having difficulty communicating  Thought process is all over the place  Slips between thinking he is part of a video game or in [address]  Raised his hand at another patient bc he thought it was part of a [employer] scene which set the other patient off  Seems confused    Staff reports he was trying to pick things off of the floor that weren't there  Cooperative with medications

## 2022-05-08 NOTE — PROGRESS NOTES
Progress Note - Rae Post 31 y o  male MRN: [id number]    Unit/Bed#: [facility] 251-01 Encounter: [id number]        Subjective:   Patient seen and examined at bedside after reviewing the chart and discussing the case with the caring staff  Patient examined at bedside  Patient has no acute complaints  Physical Exam   Vitals: Blood pressure 107/74, pulse 93, temperature 97 5 °F (36 4 °C), temperature source Temporal, resp  rate 16, height 5' 6" (1 676 m), weight 78 3 kg (172 lb 9 6 oz), SpO2 100 %  ,Body mass index is 27 86 kg/m²  Constitutional:  Patient appears in no acute distress  HEENT: PERR, EOMI, MMM  Cardiovascular:  Regular rate, regular rhythm  Pulmonary/Chest: Effort normal and breath sounds normal    Abdomen: Soft, + BS, NT  Assessment/Plan:  Rae Post is a(n) 31y o  year old male with schizoaffective disorder bipolar type      1  Tobacco abuse  Patient has been put on nicotine transdermal patch 7 mg daily  2  Arthritis/headache  Patient may get Tylenol on as needed basis  3  GERD  Patient may take Mylanta as needed  4  Insomnia  Patient may get trazodone as needed  5  Vitamin-D deficiency  Patient started on vitamin D2 61827 units weekly for 14 weeks followed by vitamin D3 1000 units daily  6  Vitamin B12 deficiency  Patient started on vitamin B12 supplement  7  Tachycardia  Patient started on metoprolol tartrate 25 mg twice daily on 04/23/2022  Continue to monitor closely  The patient was discussed with Dr Alana Romo and he is in agreement with the above note

## 2022-05-08 NOTE — PROGRESS NOTES
Progress Note - Behavioral Health   Cheyanne Cross 31 y o  male MRN: [id number]  Unit/Bed#: [facility] 251-01 Encounter: [id number]    Assessment/Plan   Principal Problem:    Schizoaffective disorder, bipolar type ([address] )      Subjective:Patient was seen today for continuation of care, records reviewed and  patient was discussed with the morning case review team  Patient required IM Haldol and ativan at 3am due to confusion and agitation upon awakening where he tried to exit seek and set off the door alarm  Medication was effective and patient returned to sleep  He remains disorganized, psychotic with internal preoccupation, and difficult to understand  Was seen sitting on the floor next to his room, when asked why he was on the ground, he states "I am hiding from the snipers"  Continue to taper lithium, morning dose of 150 mg was discontinued today and continue with antipsychotic therapy  He denies side effects and no withdrawal symptoms are present  Patient denies endorsing any suicidal or homicidal ideation  Remains medication compliance  Denies any side effects from medications   VPA: 108 on 4/26, Li: 0 7 on 5/3         Psychiatric Review of Systems:    Sleep: poor about 4-5 hrs  Appetite: fair  Medication side effects: No   ROS: all other systems are negative    Vitals:  Vitals:    05/08/22 0810   BP:    Pulse:    Resp:    Temp: 97 5 °F (36 4 °C)   SpO2:        Mental Status Evaluation:    Appearance:  disheveled   Behavior:  cooperative   Speech:  mumbled   Mood:  dysphoric   Affect:  blunted   Thought Process:  disorganized   Associations: concrete associations   Thought Content:  no overt delusions   Perceptual Disturbances: denies auditory hallucinations when asked   Risk Potential: Suicidal ideation - None at present  Homicidal ideation - None at present  Potential for aggression - No   Sensorium:  oriented to person and place   Memory:  recent memory mildly impaired   Consciousness:  alert and awake   Attention: attention span and concentration appear shorter than expected for age   Insight:  impaired   Judgment: impaired   Gait/Station: normal gait/station   Motor Activity: no abnormal movements     Laboratory results:    I have personally reviewed all pertinent laboratory/tests results    Depakote:   Lab Results   Component Value Date    VALPROICTOT 108 (H) 04/26/2022     Lithium:   Lab Results   Component Value Date    LITHIUM 0 7 05/03/2022       Progress Toward Goals:     Unchanged    Recommended Treatment:   Discontinue am Lithium 150mg, primary treatment team to continue lithium taper    All current active medications have been reviewed  Encourage group therapy, milieu therapy and occupational therapy  Continue treatment with group therapy, milieu therapy and occupational therapy  Behavioral Health checks every 7 minutes  Decrease am Lithium   Current Facility-Administered Medications   Medication Dose Route Frequency Provider Last Rate    acetaminophen  650 mg Oral Q6H PRN Jonatan Eaton Medei, CRNP      acetaminophen  650 mg Oral Q4H PRN Jonatan Eaton Medei, CRNP      acetaminophen  975 mg Oral Q6H PRN Jonatan Eaton Medei, CRNP      aluminum-magnesium hydroxide-simethicone  30 mL Oral Q4H PRN Jonatan Eaton Medei, CRNP      haloperidol lactate  2 5 mg Intramuscular Q6H PRN Max 4/day Ivanna M Medei, CRNP      And    LORazepam  1 mg Intramuscular Q6H PRN Max 4/day Jonatan Eaton Medei, CRNP      And    benztropine  0 5 mg Intramuscular Q6H PRN Max 4/day Ivanna M Medei, CRNP      LORazepam  2 mg Intramuscular Q4H PRN Max 4/day Jonatan Eaton Medei, CRNP      And    benztropine  1 mg Intramuscular Q4H PRN Max 4/day Jonatan Eaton Medei, CRNP      benztropine  1 mg Oral Q6H PRN Jonatan Orozco Medei, CRNP      chlorproMAZINE  50 mg Intramuscular Q6H PRN Rufina Hugo MD      chlorproMAZINE  75 mg Oral Q6H PRN MD Angelita Aguirre ON 7/26/2022] cholecalciferol  1,000 Units Oral Daily Shanice Garcia PA-C      clonazePAM  1 mg Oral BID [facility] Medei, CRNP      cyanocobalamin  1,000 mcg Oral Daily Shanice Garcia PA-C      divalproex sodium  1,000 mg Oral HS Kam Vazquez MD      ergocalciferol  50,000 Units Oral Weekly CRISTIANO GuerraC      hydrOXYzine HCL  25 mg Oral Q6H PRN Max 4/day [facility] Medei, CRNP      hydrOXYzine HCL  50 mg Oral Q4H PRN Max 4/day [facility], CRNP      Or    LORazepam  1 mg Intramuscular Q4H PRN Bill Eddi Medei, CRNP      lithium carbonate  150 mg Oral Daily Arlyne Huge, CRNP      lithium carbonate  600 mg Oral HS [place] Medei, CRNP      LORazepam  1 mg Oral Q6H PRN [facility], CRNP      Or    LORazepam  2 mg Intramuscular Q6H PRN Max 3/day [facility], CRNP      metoprolol tartrate  25 mg Oral Q12H [address] Shanice Garcia PA-C      nicotine  1 patch Transdermal Daily [facility], CRNP      nicotine polacrilex  2 mg Oral Q2H PRN Shanice Garcia PA-C      OLANZapine  10 mg Oral Q3H PRN Kam Vazquez MD      OLANZapine  5 mg Oral Q3H PRN Kam Vazquez MD      OLANZapine  10 mg Intramuscular Q2H PRN Kam Vazquez MD      polyethylene glycol  17 g Oral Daily PRN Bill Eddi Medei, CRNP      QUEtiapine  400 mg Oral HS Kam Vazquez MD      risperiDONE  4 mg Oral BID [facility] Medei, CRNP      traZODone  150 mg Oral HS PRN [facility], CRALFREDITO      traZODone  50 mg Oral HS Brayan Olivia,          Risks / Benefits of Treatment:     Risks, benefits, and possible side effects of medications explained to patient  Patient has limited understanding of risks and benefits of treatment at this time, but agrees to take medications as prescribed  Counseling / Coordination of Care:     Patient's progress reviewed with nursing staff  Medications, treatment progress and treatment plan reviewed with patient  Supportive counseling provided to the patient            Poly Aguirre CRNP

## 2022-05-08 NOTE — PLAN OF CARE
Problem: Alteration in Thoughts and Perception  Goal: Treatment Goal: Gain control of psychotic behaviors/thinking, reduce/eliminate presenting symptoms and demonstrate improved reality functioning upon discharge  5/8/2022 1757 by Sherron Goodman RN  Outcome: Progressing  5/8/2022 1016 by Sherron Goodman RN  Outcome: Progressing  Goal: Verbalize thoughts and feelings  Description: Interventions:  - Promote a nonjudgmental and trusting relationship with the patient through active listening and therapeutic communication  - Assess patient's level of functioning, behavior and potential for risk  - Engage patient in 1 on 1 interactions  - Encourage patient to express fears, feelings, frustrations, and discuss symptoms    - [place] patient to reality, help patient recognize reality-based thinking   - Administer medications as ordered and assess for potential side effects  - Provide the patient education related to the signs and symptoms of the illness and desired effects of prescribed medications  5/8/2022 1757 by Sherron Goodman RN  Outcome: Progressing  5/8/2022 1016 by Sherron Goodman RN  Outcome: Progressing  Goal: Refrain from acting on delusional thinking/internal stimuli  Description: Interventions:  - Monitor patient closely, per order   - Utilize least restrictive measures   - Set reasonable limits, give positive feedback for acceptable   - Administer medications as ordered and monitor of potential side effects  5/8/2022 1757 by Sherron Goodman RN  Outcome: Progressing  5/8/2022 1016 by Sherron Goodman RN  Outcome: Progressing  Goal: Agree to be compliant with medication regime, as prescribed and report medication side effects  Description: Interventions:  - Offer appropriate PRN medication and supervise ingestion; conduct AIMS, as needed   5/8/2022 1757 by Sherron Goodman RN  Outcome: Progressing  5/8/2022 1016 by Sherron Goodman RN  Outcome: Progressing  Goal: Attend and participate in unit activities, including therapeutic, recreational, and educational groups  Description: Interventions:  -Encourage Visitation and family involvement in care  5/8/2022 1757 by Josh Acosta RN  Outcome: Progressing  5/8/2022 1016 by Josh Acosta RN  Outcome: Progressing  Goal: Recognize dysfunctional thoughts, communicate reality-based thoughts at the time of discharge  Description: Interventions:  - Provide medication and psycho-education to assist patient in compliance and developing insight into his/her illness   5/8/2022 1757 by Josh Acosta RN  Outcome: Progressing  5/8/2022 1016 by Johs Acosta RN  Outcome: Progressing  Goal: Complete daily ADLs, including personal hygiene independently, as able  Description: Interventions:  - Observe, teach, and assist patient with ADLS  - Monitor and promote a balance of rest/activity, with adequate nutrition and elimination   5/8/2022 1757 by Josh Acosta RN  Outcome: Progressing  5/8/2022 1016 by Josh Acosta RN  Outcome: Progressing

## 2022-05-09 PROCEDURE — 99232 SBSQ HOSP IP/OBS MODERATE 35: CPT | Performed by: HOSPITALIST

## 2022-05-09 RX ORDER — BENZTROPINE MESYLATE 1 MG/1
1 TABLET ORAL
Status: DISCONTINUED | OUTPATIENT
Start: 2022-05-09 | End: 2022-05-25 | Stop reason: HOSPADM

## 2022-05-09 RX ORDER — RISPERIDONE 2 MG/1
2 TABLET, ORALLY DISINTEGRATING ORAL 2 TIMES DAILY
Status: DISCONTINUED | OUTPATIENT
Start: 2022-05-09 | End: 2022-05-10

## 2022-05-09 RX ORDER — HALOPERIDOL 10 MG/1
10 TABLET ORAL
Status: DISCONTINUED | OUTPATIENT
Start: 2022-05-09 | End: 2022-05-16

## 2022-05-09 RX ADMIN — CHLORPROMAZINE HYDROCHLORIDE 75 MG: 25 TABLET, SUGAR COATED ORAL at 06:41

## 2022-05-09 RX ADMIN — ERGOCALCIFEROL 50000 UNITS: 1.25 CAPSULE ORAL at 08:05

## 2022-05-09 RX ADMIN — TRAZODONE HYDROCHLORIDE 150 MG: 150 TABLET ORAL at 20:44

## 2022-05-09 RX ADMIN — BENZTROPINE MESYLATE 1 MG: 1 TABLET ORAL at 20:44

## 2022-05-09 RX ADMIN — METOPROLOL TARTRATE 25 MG: 25 TABLET ORAL at 08:05

## 2022-05-09 RX ADMIN — DIVALPROEX SODIUM 1000 MG: 500 TABLET, DELAYED RELEASE ORAL at 20:48

## 2022-05-09 RX ADMIN — LITHIUM CARBONATE 600 MG: 300 CAPSULE, GELATIN COATED ORAL at 20:43

## 2022-05-09 RX ADMIN — CYANOCOBALAMIN TAB 500 MCG 1000 MCG: 500 TAB at 08:05

## 2022-05-09 RX ADMIN — RISPERIDONE 2 MG: 2 TABLET, ORALLY DISINTEGRATING ORAL at 17:10

## 2022-05-09 RX ADMIN — CLONAZEPAM 1 MG: 1 TABLET ORAL at 08:05

## 2022-05-09 RX ADMIN — HALOPERIDOL 10 MG: 10 TABLET ORAL at 20:44

## 2022-05-09 RX ADMIN — RISPERIDONE 4 MG: 2 TABLET, ORALLY DISINTEGRATING ORAL at 08:05

## 2022-05-09 RX ADMIN — QUETIAPINE FUMARATE 400 MG: 200 TABLET ORAL at 20:43

## 2022-05-09 RX ADMIN — METOPROLOL TARTRATE 25 MG: 25 TABLET ORAL at 20:43

## 2022-05-09 RX ADMIN — CLONAZEPAM 1 MG: 1 TABLET ORAL at 17:10

## 2022-05-09 NOTE — PROGRESS NOTES
Progress Note - Behavioral Health     Ainsley Boland 31 y o  male MRN: [id number]   Unit/Bed#: [facility] 251-01 Encounter: [id number]    Behavior over the last 24 hours: unchanged  Christa Kaminski seen today, per staff report has been disorganized and bizarre on the unit  On exam today patient continues to be disorganized, has difficulty with linear thought process and remains unable to have a cohesive conversation  Patient remains psychotic and lacks the ability be functional in caring for himself  Denies suicidal or homicidal ideation  Continues to have poor insight and judgment  Sleep and appetite fair  No reported side effects to medications  Mental Status Evaluation:    Appearance:  disheveled, marginal hygiene, looks stated age   Behavior:  bizarre   Speech:  scant, increased latency of response   Mood:  dysphoric, irritable   Affect:  blunted   Thought Process:  disorganized, illogical   Associations: circumstantial associations   Thought Content:  negative thinking, intrusive thoughts, poverty of thought   Perceptual Disturbances: no auditory hallucinations, no visual hallucinations   Risk Potential: Suicidal ideation - None at present  Homicidal ideation - None at present   Sensorium:  oriented to person, place and time/date   Memory:  recent and remote memory grossly intact   Consciousness:  alert and awake   Attention: decreased concentration and decreased attention span   Insight:  limited   Judgment: limited   Gait/Station: normal gait/station   Motor Activity: no abnormal movements     Vital signs in last 24 hours:    Temp:  [97 5 °F (36 4 °C)-98 5 °F (36 9 °C)] 97 5 °F (36 4 °C)  HR:  [] 108  Resp:  [16] 16  BP: (115-136)/(75-78) 115/75    Laboratory results: I have personally reviewed all pertinent laboratory/tests results          Assessment/Plan   Principal Problem:    Schizoaffective disorder, bipolar type ([facility] )    Recommended Treatment:     Planned medication and treatment changes:  Decrease Risperdal to 2 mg b i d , patient has had lack of response from maximum dose of Risperdal 4 mg b i d  Will add Haldol 10 mg at bedtime and Cogentin 1 mg at bedtime     Continue new Depakote 1000 mg at bedtime  Continue lithium 600 mg daily at bedtime, likely plan to decrease and discontinue this medication as there has been no significant benefit from it  Continue Seroquel 400 mg at bedtime  All current active medications have been reviewed  Encourage group therapy, milieu therapy and occupational therapy  Behavioral Health checks every 7 minutes  Current Facility-Administered Medications   Medication Dose Route Frequency Provider Last Rate    acetaminophen  650 mg Oral Q6H PRN [facility] Medei, CRNP      acetaminophen  650 mg Oral Q4H PRN [facility] Medei, CRNP      acetaminophen  975 mg Oral Q6H PRN [facility] Medei, CRNP      aluminum-magnesium hydroxide-simethicone  30 mL Oral Q4H PRN [facility] Medei, CRNP      haloperidol lactate  2 5 mg Intramuscular Q6H PRN Max 4/day [facility] Medei, CRNP      And    LORazepam  1 mg Intramuscular Q6H PRN Max 4/day [facility] Medei, CRNP      And    benztropine  0 5 mg Intramuscular Q6H PRN Max 4/day [facility] Medei, CRNP      LORazepam  2 mg Intramuscular Q4H PRN Max 4/day [facility] Medei, CRNP      And    benztropine  1 mg Intramuscular Q4H PRN Max 4/day [facility] Medei, CRNP      benztropine  1 mg Oral Q6H PRN [facility] Medei, CRNP      benztropine  1 mg Oral HS Colletta Headings, MD      chlorproMAZINE  50 mg Intramuscular Q6H PRN Jessica Adamson MD      chlorproMAZINE  75 mg Oral Q6H PRN Jessica Adamson MD      [START ON 7/26/2022] cholecalciferol  1,000 Units Oral Daily Shanice Garcia PA-C      clonazePAM  1 mg Oral BID Ivanna Ramirez, BRIONNA      cyanocobalamin  1,000 mcg Oral Daily Shanice Garcia PA-C      divalproex sodium  1,000 mg Oral HS Jessica Adamson MD      ergocalciferol  50,000 Units Oral Weekly Shanice ROSE MAY Garcia      haloperidol  10 mg Oral HS Abbi Arroyo MD      hydrOXYzine HCL  25 mg Oral Q6H PRN Max 4/day Ivanna M Medei, CRNP      hydrOXYzine HCL  50 mg Oral Q4H PRN Max 4/day Carla Lalito Medei, CRNP      Or    LORazepam  1 mg Intramuscular Q4H PRN Carla Lalito Medei, CRNP      lithium carbonate  600 mg Oral HS Carla Lalito Medei, CRNP      LORazepam  1 mg Oral Q6H PRN Carla Lalito Medei, CRNP      Or    LORazepam  2 mg Intramuscular Q6H PRN Max 3/day Carla Lalito Medei, CRNP      metoprolol tartrate  25 mg Oral Q12H [address] Shanice Garcia PA-C      nicotine  1 patch Transdermal Daily Carla Lalito Medei, CRNP      nicotine polacrilex  2 mg Oral Q2H PRN Shanice Garcia PA-C      OLANZapine  10 mg Oral Q3H PRN Tiffanie France MD      OLANZapine  5 mg Oral Q3H PRN Tiffanie France MD      OLANZapine  10 mg Intramuscular Q2H PRN Tiffanie France MD      polyethylene glycol  17 g Oral Daily PRN Carla Lalito Medei, CRNP      QUEtiapine  400 mg Oral HS Tiffanie France MD      risperiDONE  2 mg Oral BID Abbi Arroyo MD      traZODone  150 mg Oral HS PRN Carla Lalito Medei, CRNP      traZODone  50 mg Oral HS Amelie Jhaveri DO         Risks / Benefits of Treatment:    Risks, benefits, and possible side effects of medications explained to patient and patient verbalizes understanding and agreement for treatment  Counseling / Coordination of Care: Total floor / unit time spent today 25 minutes  Greater than 50% of total time was spent with the patient and / or family counseling and / or coordination of care  A description of counseling / coordination of care:  Patient's progress discussed with staff in treatment team meeting  Medications, treatment progress and treatment plan reviewed with patient      Abbi Arroyo MD 05/09/22

## 2022-05-09 NOTE — NURSING NOTE
Claudiaflo Shay was observed isolative to room and wondering around unit  Pt is disheveled  Pt is drowsy  Pt is mumbled in speech and labile at times  Pt is disorganized in thought  Pt denies anxiety, depression, SI/HI/AVH  Support offered  Will continue to monitor

## 2022-05-09 NOTE — PROGRESS NOTES
05/09/22 0830   Team Meeting   Meeting Type Daily Rounds   Team Members Present   Team Members Present Physician;Nurse;   Physician Team Member Dr Tonny Monae MD; Binh Bennett, [address] [place] [address]   Nursing Team Member Ansley Manjarrez, [facility]   Care Management Team Member Taye Nagy MS, [facility]   Patient/Family Present   Patient Present No   Patient's Family Present No   Medication adjustment, disorganized, confused, consistent redirection, verbally aggressive, quiet room for less stimulation seems to be effective, drowsy, slept, didn't sleep well on saturday agitated when awakes

## 2022-05-09 NOTE — NURSING NOTE
Patient woke up this morning agitated and yelling  Appears to be responding to internal stimuli  Getting into arguments with other patients  Agreeable to a prn medication  Thorazine 75 mg provided    Will monitor for effectiveness

## 2022-05-09 NOTE — PROGRESS NOTES
Progress Note - Marva Hernández 31 y o  male MRN: [id number]    Unit/Bed#: [facility] 251-01 Encounter: [id number]        Subjective:   Patient seen and examined at bedside after reviewing the chart and discussing the case with the caring staff  Patient examined at bedside  Patient has no acute complaints  Physical Exam   Vitals: Blood pressure 115/75, pulse (!) 108, temperature 97 5 °F (36 4 °C), temperature source Temporal, resp  rate 16, height 5' 6" (1 676 m), weight 78 3 kg (172 lb 9 6 oz), SpO2 100 %  ,Body mass index is 27 86 kg/m²  Constitutional:  Patient appears in no acute distress  HEENT: PERR, EOMI, MMM  Cardiovascular:  Regular rate, regular rhythm  Pulmonary/Chest: Effort normal and breath sounds normal    Abdomen: Soft, + BS, NT  Assessment/Plan:  Marva Hernández is a(n) 31y o  year old male with schizoaffective disorder bipolar type      1  Tobacco abuse  Patient has been put on nicotine transdermal patch 7 mg daily  2  Arthritis/headache  Patient may get Tylenol on as needed basis  3  GERD  Patient may take Mylanta as needed  4  Insomnia  Patient may get trazodone as needed  5  Vitamin-D deficiency  Patient started on vitamin D2 82753 units weekly for 14 weeks followed by vitamin D3 1000 units daily  6  Vitamin B12 deficiency  Patient started on vitamin B12 supplement  7  Tachycardia  Patient started on metoprolol tartrate 25 mg twice daily on 04/23/2022  Continue to monitor closely

## 2022-05-09 NOTE — PROGRESS NOTES
05/09/22 0800   Activity/Group Checklist   Group Community meeting   Attendance Attended   Attendance Duration (min) 16-30   Interactions Interacted appropriately   Affect/Mood Appropriate;Calm   Goals Achieved Able to listen to others; Able to engage in interactions; Able to recieve feedback

## 2022-05-09 NOTE — NURSING NOTE
Patient continues to wander around the halls  Mumbles and frequently has a dazed look about him  Behaviors controlled on evening shift  No yelling or outbursts  No verbal altercations with peers  Cooperative with medications  Patient c/o constipation  Wolfgang Keller he had a bowel movement 5/7 but feels as though he needs to go but cannot  Prune juice and prn Miralax provided    Advised patient to let nursing know if he still hasn't gone by breakfast tomorrow

## 2022-05-09 NOTE — PLAN OF CARE
Problem: Alteration in Thoughts and Perception  Goal: Treatment Goal: Gain control of psychotic behaviors/thinking, reduce/eliminate presenting symptoms and demonstrate improved reality functioning upon discharge  Outcome: Progressing  Goal: Verbalize thoughts and feelings  Description: Interventions:  - Promote a nonjudgmental and trusting relationship with the patient through active listening and therapeutic communication  - Assess patient's level of functioning, behavior and potential for risk  - Engage patient in 1 on 1 interactions  - Encourage patient to express fears, feelings, frustrations, and discuss symptoms    - [place] patient to reality, help patient recognize reality-based thinking   - Administer medications as ordered and assess for potential side effects  - Provide the patient education related to the signs and symptoms of the illness and desired effects of prescribed medications  Outcome: Progressing  Goal: Refrain from acting on delusional thinking/internal stimuli  Description: Interventions:  - Monitor patient closely, per order   - Utilize least restrictive measures   - Set reasonable limits, give positive feedback for acceptable   - Administer medications as ordered and monitor of potential side effects  Outcome: Progressing  Goal: Agree to be compliant with medication regime, as prescribed and report medication side effects  Description: Interventions:  - Offer appropriate PRN medication and supervise ingestion; conduct AIMS, as needed   Outcome: Progressing  Goal: Attend and participate in unit activities, including therapeutic, recreational, and educational groups  Description: Interventions:  -Encourage Visitation and family involvement in care  Outcome: Progressing  Goal: Recognize dysfunctional thoughts, communicate reality-based thoughts at the time of discharge  Description: Interventions:  - Provide medication and psycho-education to assist patient in compliance and developing insight into his/her illness   Outcome: Progressing  Goal: Complete daily ADLs, including personal hygiene independently, as able  Description: Interventions:  - Observe, teach, and assist patient with ADLS  - Monitor and promote a balance of rest/activity, with adequate nutrition and elimination   Outcome: Progressing     Problem: Depression  Goal: Treatment Goal: Demonstrate behavioral control of depressive symptoms, verbalize feelings of improved mood/affect, and adopt new coping skills prior to discharge  Outcome: Progressing  Goal: Verbalize thoughts and feelings  Description: Interventions:  - Assess and re-assess patient's level of risk   - Engage patient in 1:1 interactions, daily, for a minimum of 15 minutes   - Encourage patient to express feelings, fears, frustrations, hopes   Outcome: Progressing  Goal: Refrain from isolation  Description: Interventions:  - Develop a trusting relationship   - Encourage socialization   Outcome: Progressing  Goal: Refrain from self-neglect  Outcome: Progressing  Goal: Attend and participate in unit activities, including therapeutic, recreational, and educational groups  Description: Interventions:  - Provide therapeutic and educational activities daily, encourage attendance and participation, and document same in the medical record   Outcome: Progressing  Goal: Complete daily ADLs, including personal hygiene independently, as able  Description: Interventions:  - Observe, teach, and assist patient with ADLS  -  Monitor and promote a balance of rest/activity, with adequate nutrition and elimination   Outcome: Progressing     Problem: Risk for Violence/Aggression Toward Others  Goal: Treatment Goal: Refrain from acts of violence/aggression during length of stay, and demonstrate improved impulse control at the time of discharge  Outcome: Progressing  Goal: Verbalize thoughts and feelings  Description: Interventions:  - Assess and re-assess patient's level of risk, every waking shift  - Engage patient in 1:1 interactions, daily, for a minimum of 15 minutes   - Allow patient to express feelings and frustrations in a safe and non-threatening manner   - Establish rapport/trust with patient   Outcome: Progressing  Goal: Refrain from harming others  Outcome: Progressing  Goal: Refrain from destructive acts on the environment or property  Outcome: Progressing  Goal: Control angry outbursts  Description: Interventions:  - Monitor patient closely, per order  - Ensure early verbal de-escalation  - Monitor prn medication needs  - Set reasonable/therapeutic limits, outline behavioral expectations, and consequences   - Provide a non-threatening milieu, utilizing the least restrictive interventions   Outcome: Progressing  Goal: Attend and participate in unit activities, including therapeutic, recreational, and educational groups  Description: Interventions:  - Provide therapeutic and educational activities daily, encourage attendance and participation, and document same in the medical record   Outcome: Progressing  Goal: Identify appropriate positive anger management techniques  Description: Interventions:  - Offer anger management and coping skills groups   - Staff will provide positive feedback for appropriate anger control  Outcome: Progressing     Problem: DISCHARGE PLANNING  Goal: Discharge to home or other facility with appropriate resources  Description: INTERVENTIONS:  - Identify barriers to discharge w/patient and caregiver  - Arrange for needed discharge resources and transportation as appropriate  - Identify discharge learning needs (meds, wound care, etc )  - Refer to Case Management Department for coordinating discharge planning if the patient needs post-hospital services based on physician/advanced practitioner order or complex needs related to functional status, cognitive ability, or social support system  Outcome: Progressing     Problem: Ineffective Coping  Goal: Participates in unit activities  Description: Interventions:  - Provide therapeutic environment   - Provide required programming   - Redirect inappropriate behaviors   Outcome: Progressing     Problem: Nutrition/Hydration-ADULT  Goal: Nutrient/Hydration intake appropriate for improving, restoring or maintaining nutritional needs  Description: Monitor and assess patient's nutrition/hydration status for malnutrition  Collaborate with interdisciplinary team and initiate plan and interventions as ordered  Monitor patient's weight and dietary intake as ordered or per policy  Utilize nutrition screening tool and intervene as necessary  Determine patient's food preferences and provide high-protein, high-caloric foods as appropriate       INTERVENTIONS:  - Monitor oral intake, urinary output, labs, and treatment plans  - Assess nutrition and hydration status and recommend course of action  - Evaluate amount of meals eaten  - Assist patient with eating if necessary   - Allow adequate time for meals  - Recommend/ encourage appropriate diets, oral nutritional supplements, and vitamin/mineral supplements  - Order, calculate, and assess calorie counts as needed  - Recommend, monitor, and adjust tube feedings and TPN/PPN based on assessed needs  - Assess need for intravenous fluids  - Provide specific nutrition/hydration education as appropriate  - Include patient/family/caregiver in decisions related to nutrition  Outcome: Progressing

## 2022-05-09 NOTE — SOCIAL WORK
CM met with PT to attempt to review that a 304 is being petitioned  PT disorganized in response, difficult to understand in conversation, CM attempted to review rights with PT,  but PT does not seem to understand; reassurance provided

## 2022-05-09 NOTE — SOCIAL WORK
CM sent 304 paperwork via fax to [facility], confirmation received  CM sent 304 paperwork via email to the following:   [email or web address]; '[email or web address]' [email or web address]; [email or web address]

## 2022-05-10 PROCEDURE — 99232 SBSQ HOSP IP/OBS MODERATE 35: CPT | Performed by: PSYCHIATRY & NEUROLOGY

## 2022-05-10 RX ORDER — RISPERIDONE 1 MG/1
1 TABLET, ORALLY DISINTEGRATING ORAL 2 TIMES DAILY
Status: DISCONTINUED | OUTPATIENT
Start: 2022-05-10 | End: 2022-05-12

## 2022-05-10 RX ADMIN — RISPERIDONE 1 MG: 1 TABLET, ORALLY DISINTEGRATING ORAL at 17:10

## 2022-05-10 RX ADMIN — DIVALPROEX SODIUM 1000 MG: 500 TABLET, DELAYED RELEASE ORAL at 20:42

## 2022-05-10 RX ADMIN — HALOPERIDOL 10 MG: 10 TABLET ORAL at 20:40

## 2022-05-10 RX ADMIN — QUETIAPINE FUMARATE 400 MG: 200 TABLET ORAL at 22:40

## 2022-05-10 RX ADMIN — CLONAZEPAM 1 MG: 1 TABLET ORAL at 09:35

## 2022-05-10 RX ADMIN — METOPROLOL TARTRATE 25 MG: 25 TABLET ORAL at 09:36

## 2022-05-10 RX ADMIN — TRAZODONE HYDROCHLORIDE 150 MG: 150 TABLET ORAL at 20:42

## 2022-05-10 RX ADMIN — RISPERIDONE 2 MG: 2 TABLET, ORALLY DISINTEGRATING ORAL at 09:36

## 2022-05-10 RX ADMIN — CYANOCOBALAMIN TAB 500 MCG 1000 MCG: 500 TAB at 09:36

## 2022-05-10 RX ADMIN — BENZTROPINE MESYLATE 1 MG: 1 TABLET ORAL at 22:03

## 2022-05-10 RX ADMIN — METOPROLOL TARTRATE 25 MG: 25 TABLET ORAL at 20:40

## 2022-05-10 RX ADMIN — CLONAZEPAM 1 MG: 1 TABLET ORAL at 17:10

## 2022-05-10 RX ADMIN — LITHIUM CARBONATE 600 MG: 300 CAPSULE, GELATIN COATED ORAL at 20:40

## 2022-05-10 NOTE — PROGRESS NOTES
05/10/22 0857   Team Meeting   Meeting Type Daily Rounds   Team Members Present   Team Members Present Physician;Nurse;; Other (Discipline and Name)   Physician Team Member Shane garsia [place]   Nursing Team Member [address] [facility] Work Team Member [employer]   Other (Discipline and Name) [facility]   Patient/Family Present   Patient Present No   Patient's Family Present No     Patient continues to be delusional and disorganized, bizarre, medication changes   [address] [address] hearing 5/13

## 2022-05-10 NOTE — PROGRESS NOTES
Progress Note - Victorino Graham 31 y o  male MRN: [id number]    Unit/Bed#: [facility] 251-01 Encounter: [id number]        Subjective:   Patient seen and examined at bedside after reviewing the chart and discussing the case with the caring staff  Patient examined at bedside  Patient has no acute complaints  Physical Exam   Vitals: Blood pressure 132/78, pulse 94, temperature 97 6 °F (36 4 °C), temperature source Temporal, resp  rate 18, height 5' 6" (1 676 m), weight 78 3 kg (172 lb 9 6 oz), SpO2 97 %  ,Body mass index is 27 86 kg/m²  Constitutional:  Patient appears in no acute distress  HEENT: PERR, EOMI, MMM  Cardiovascular:  Regular rate, regular rhythm  Pulmonary/Chest: Effort normal and breath sounds normal    Abdomen: Soft, + BS, NT  Assessment/Plan:  Victorino Graham is a(n) 31y o  year old male with schizoaffective disorder bipolar type      1  Tobacco abuse  Patient has been put on nicotine transdermal patch 7 mg daily  2  Arthritis/headache  Patient may get Tylenol on as needed basis  3  GERD  Patient may take Mylanta as needed  4  Insomnia  Patient may get trazodone as needed  5  Vitamin-D deficiency  Patient started on vitamin D2 75060 units weekly for 14 weeks followed by vitamin D3 1000 units daily  6  Vitamin B12 deficiency  Patient started on vitamin B12 supplement  7  Tachycardia  Patient started on metoprolol tartrate 25 mg twice daily on 04/23/2022  Continue to monitor closely

## 2022-05-10 NOTE — NURSING NOTE
No significant changes in patient behaviors  Wandering around the halls  Mumbles and occasionally yells  Got loud during the early part of the evening shift  Started yelling that other patients were yelling too loudly  Covered his ears  Offered patient room 243  Patient agreeable  Calmer after approximately 15 minutes  Returned to the community and ate snack  During assessment patient reported he is scared  Could not communicate effectively why he is scared  Said something about "in case something happens"  Later in the evening he was yelling on the phone  Patient able to be redirected    Cooperative with medications

## 2022-05-10 NOTE — NURSING NOTE
Presents with affect brighter than our last encounter 2 days ago  Endorses mild to moderate,controlled depression and anxiety due to length of inpatient stay  He does attend groups with active participation:visible on unit  He does initiate conversation with both peers and staff on an occasional basis  No behaviors today,less redirection required  In addition to documented education we discussed possible coping skills:stated "what do I have to do to go home/"  Will continue to educate,monitor,and provide safe,tehrapeutic milieu

## 2022-05-10 NOTE — PROGRESS NOTES
05/10/22 1000   Activity/Group Checklist   Group Cognitive therapy   Attendance Attended   Attendance Duration (min) 31-45   Interactions Interacted appropriately   Affect/Mood Appropriate;Calm;Blunted/flat   Goals Achieved Identified feelings; Able to listen to others; Able to recieve feedback; Able to give feedback to another  (Able to redirect patient and keep his focus on subject)

## 2022-05-10 NOTE — TREATMENT TEAM
05/10/22 0800   Activity/Group Checklist   Group Community meeting   Attendance Attended   Attendance Duration (min) 16-30   Interactions Disorganized interaction   Affect/Mood Appropriate;Calm;Wide;MONICA   Goals Achieved Identified feelings; Able to listen to others; Able to engage in interactions; Able to recieve feedback  (Disorganized thought continues)

## 2022-05-10 NOTE — NURSING NOTE
Patient compliant with meds and meals  Denies everything  Patient visible and social on unit  Cont to wander unit and at times mumbles when speaking can be hard to understand at times  Patients behavior is controlled  Q 7 min behavioral and safety checks in place

## 2022-05-10 NOTE — PLAN OF CARE
Problem: Alteration in Thoughts and Perception  Goal: Treatment Goal: Gain control of psychotic behaviors/thinking, reduce/eliminate presenting symptoms and demonstrate improved reality functioning upon discharge  Outcome: Not Progressing  Goal: Verbalize thoughts and feelings  Description: Interventions:  - Promote a nonjudgmental and trusting relationship with the patient through active listening and therapeutic communication  - Assess patient's level of functioning, behavior and potential for risk  - Engage patient in 1 on 1 interactions  - Encourage patient to express fears, feelings, frustrations, and discuss symptoms    - [place] patient to reality, help patient recognize reality-based thinking   - Administer medications as ordered and assess for potential side effects  - Provide the patient education related to the signs and symptoms of the illness and desired effects of prescribed medications  Outcome: Progressing  Goal: Refrain from acting on delusional thinking/internal stimuli  Description: Interventions:  - Monitor patient closely, per order   - Utilize least restrictive measures   - Set reasonable limits, give positive feedback for acceptable   - Administer medications as ordered and monitor of potential side effects  Outcome: Not Progressing  Goal: Agree to be compliant with medication regime, as prescribed and report medication side effects  Description: Interventions:  - Offer appropriate PRN medication and supervise ingestion; conduct AIMS, as needed   Outcome: Progressing  Goal: Attend and participate in unit activities, including therapeutic, recreational, and educational groups  Description: Interventions:  -Encourage Visitation and family involvement in care  Outcome: Progressing  Goal: Recognize dysfunctional thoughts, communicate reality-based thoughts at the time of discharge  Description: Interventions:  - Provide medication and psycho-education to assist patient in compliance and developing insight into his/her illness   Outcome: Progressing  Goal: Complete daily ADLs, including personal hygiene independently, as able  Description: Interventions:  - Observe, teach, and assist patient with ADLS  - Monitor and promote a balance of rest/activity, with adequate nutrition and elimination   Outcome: Progressing

## 2022-05-11 PROCEDURE — 99232 SBSQ HOSP IP/OBS MODERATE 35: CPT | Performed by: NURSE PRACTITIONER

## 2022-05-11 RX ADMIN — RISPERIDONE 1 MG: 1 TABLET, ORALLY DISINTEGRATING ORAL at 08:48

## 2022-05-11 RX ADMIN — LITHIUM CARBONATE 600 MG: 300 CAPSULE, GELATIN COATED ORAL at 22:32

## 2022-05-11 RX ADMIN — HALOPERIDOL 10 MG: 10 TABLET ORAL at 22:33

## 2022-05-11 RX ADMIN — CYANOCOBALAMIN TAB 500 MCG 1000 MCG: 500 TAB at 08:48

## 2022-05-11 RX ADMIN — RISPERIDONE 1 MG: 1 TABLET, ORALLY DISINTEGRATING ORAL at 17:29

## 2022-05-11 RX ADMIN — CLONAZEPAM 1 MG: 1 TABLET ORAL at 08:48

## 2022-05-11 RX ADMIN — BENZTROPINE MESYLATE 1 MG: 1 TABLET ORAL at 22:33

## 2022-05-11 RX ADMIN — QUETIAPINE FUMARATE 400 MG: 200 TABLET ORAL at 22:33

## 2022-05-11 RX ADMIN — CLONAZEPAM 1 MG: 1 TABLET ORAL at 17:29

## 2022-05-11 RX ADMIN — ACETAMINOPHEN 650 MG: 325 TABLET ORAL at 07:00

## 2022-05-11 RX ADMIN — DIVALPROEX SODIUM 1000 MG: 500 TABLET, DELAYED RELEASE ORAL at 22:33

## 2022-05-11 NOTE — PROGRESS NOTES
Progress Note - Jasbir Kramer 31 y o  male MRN: [id number]    Unit/Bed#: [facility] 251-01 Encounter: [id number]        Subjective:   Patient seen and examined at bedside after reviewing the chart and discussing the case with the caring staff  Patient examined at bedside  Patient has no acute complaints  Physical Exam   Vitals: Blood pressure 94/64, pulse 84, temperature 98 3 °F (36 8 °C), temperature source Temporal, resp  rate 18, height 5' 6" (1 676 m), weight 78 3 kg (172 lb 9 6 oz), SpO2 95 %  ,Body mass index is 27 86 kg/m²  Constitutional:  Patient appears in no acute distress  HEENT: PERR, EOMI, MMM  Cardiovascular:  Regular rate, regular rhythm  Pulmonary/Chest: Effort normal and breath sounds normal    Abdomen: Soft, + BS, NT  Assessment/Plan:  Jasbir Kramer is a(n) 31y o  year old male with schizoaffective disorder bipolar type      1  Tobacco abuse  Patient has been put on nicotine transdermal patch 7 mg daily  2  Arthritis/headache  Patient may get Tylenol on as needed basis  3  GERD  Patient may take Mylanta as needed  4  Insomnia  Patient may get trazodone as needed  5  Vitamin-D deficiency  Patient started on vitamin D2 42590 units weekly for 14 weeks followed by vitamin D3 1000 units daily  6  Vitamin B12 deficiency  Patient started on vitamin B12 supplement  7  Tachycardia  Patient started on metoprolol tartrate 25 mg twice daily on 04/23/2022  Continue to monitor closely  The patient was discussed with Dr Julissa Frausto and he is in agreement with the above note

## 2022-05-11 NOTE — PROGRESS NOTES
Progress Note - Behavioral Health   Victorino Graham 31 y o  male MRN: @MRN   Unit/Bed#: [facility] 251-01 Encounter: [id number]      Report from staff regarding this patient received and discussed, and records reviewed prior to seeing this patient  Behavior over the last 24 hours: slowly improving  The patient was visible in the unit, walking in the unit not agitating state, some disorganization of thought pattern lead to problems with expressing of patient's thoughts  However the patient admitted that she he feel more calm and visibly he was more relaxed  Patient remains anxious, appropriate and bizarre today  Sleep: slept better  Appetite: fair  Medication side effects: Yes - sedation     Mental Status Evaluation:    Appearance:  dressed in hospital attire   Mood:  anxious   Affect: constricted    Speech:  decreased rate, slow   Thought Content:  negative thinking, poverty of thought   Perceptual Disturbances: denies auditory hallucinations when asked, does not appear responding to internal stimuli   Risk Potential: Suicidal ideation - None, contracts for safety on the unit  Homicidal ideation - None  Potential for aggression - No   Insight:  impaired   Judgment: impaired   Motor Activity: no abnormal movements         Laboratory results:  I have personally reviewed all pertinent laboratory results  Progress Toward Goals: limited improvement    Assessment/Plan   Principal Problem:    Schizoaffective disorder, bipolar type ([facility] [address] )    Recommended Treatment:   Because of the patient visible sedation in daytime, the writer will continue to taper risperidone to 1 mg twice a day, supportive therapy was also provided patient was receptive  The patient no longer actively agitated not screaming or using inflammatory language, at the same time his functioning level is still not optimal     Planned medication and treatment changes: All current active medications have been reviewed    Continue treatment with group therapy, milieu therapy, occupational therapy and medication management  ** Please Note: This note has been constructed using a voice recognition system  **      BMP: No results for input(s): NA, K, CL, CO2, BUN in the last 72 hours      Invalid input(s): CREA, GLU  Vitals:    05/10/22 1513   BP: 114/76   Pulse: 94   Resp: 16   Temp: 98 2 °F (36 8 °C)   SpO2: 100%        Medication Administration - last 24 hours from 05/09/2022 2227 to 05/10/2022 2227       Date/Time Order Dose Route Action Action by     05/10/2022 0937 nicotine (NICODERM CQ) 7 mg/24hr TD 24 hr patch 1 patch 1 patch Transdermal Not Given [facility]     05/10/2022 2042 divalproex sodium (DEPAKOTE) EC tablet 1,000 mg 1,000 mg Oral Given [facility] Melo, GIA     05/10/2022 2040 metoprolol tartrate (LOPRESSOR) tablet 25 mg 25 mg Oral Given [facility] GIA [place]     05/10/2022 0936 metoprolol tartrate (LOPRESSOR) tablet 25 mg 25 mg Oral Given [facility]     05/10/2022 0936 cyanocobalamin (VITAMIN B-12) tablet 1,000 mcg 1,000 mcg Oral Given [facility]     05/10/2022 1710 clonazePAM (KlonoPIN) tablet 1 mg 1 mg Oral Given Eli Lugo RN     05/10/2022 0935 clonazePAM (KlonoPIN) tablet 1 mg 1 mg Oral Given [facility]     05/10/2022 2040 lithium carbonate capsule 600 mg 600 mg Oral Given [facility] Melo RN     05/10/2022 2042 traZODone (DESYREL) tablet 150 mg 150 mg Oral Given [facility] Melo, RN     05/10/2022 0936 risperiDONE (RisperDAL M-TAB) disintegrating tablet 2 mg 2 mg Oral Given [facility]     05/10/2022 2040 haloperidol (HALDOL) tablet 10 mg 10 mg Oral Given [facility] Melo, RN     05/10/2022 2203 benztropine (COGENTIN) tablet 1 mg 1 mg Oral Given [facility] Melo RN     05/10/2022 1710 risperiDONE (RisperDAL M-TAB) disintegrating tablet 1 mg 1 mg Oral Given Eli Lugo RN

## 2022-05-11 NOTE — PROGRESS NOTES
05/11/22 0800   Activity/Group Checklist   Group Community meeting   Attendance Attended   Attendance Duration (min) 16-30   Interactions Interacted appropriately   Affect/Mood Appropriate;Calm   Goals Achieved Able to listen to others; Able to engage in interactions; Able to recieve feedback

## 2022-05-11 NOTE — PROGRESS NOTES
Progress Note - Behavioral Health   Roz Robison 31 y o  male MRN: [id number]  Unit/Bed#: U 251-01 Encounter: [id number]    Assessment/Plan   Principal Problem:    Schizoaffective disorder, bipolar type ([address] )      Behavior over the last 24 hours:  Some improvement  Sleep:  Improving  Appetite:  Fair  Medication side effects: No  ROS: no complaints and all other systems are negative    Eli Villaseñor was seen this morning for psychiatric follow-up  He continues to appear mildly sedated, but affect was brighter  Mood is controlled and continues with disorganized thought process  He spoke briefly about a dog and then made nonsensical, mumbled statements thereafter  Patient is able to verbally redirect and has had no episodes of agitation or aggression on unit  Remains compliant with medications with improved sleep over the past 3 nights  Continues to appear internally preoccupied and is less perseverative on discharge home and video games  Mental Status Evaluation:  Appearance:  age appropriate and Marginal hygiene, casually dressed   Behavior:  Cooperative, redirectable, bizarre   Speech:  soft and Mumbled, nonsensical at times   Mood:  Less anxious   Affect:  blunted   Thought Process:  disorganized and illogical   Thought Content:  Bizarre delusions   Perceptual Disturbances: Appears internally preoccupied   Risk Potential: Suicidal Ideations none  Homicidal Ideations none  Potential for Aggression No   Sensorium:  person and place   Memory:  recent and remote memory: unable to assess due to lack of cooperation, patient does not answer   Consciousness:  alert and awake    Attention: attention span appeared shorter than expected for age   Insight:  impaired due to Psychosis   Judgment: impaired due to Psychosis   Gait/Station: slow   Motor Activity: no abnormal movements     Progress Toward Goals:  Slight improvement  Patient no longer agitated, but remains disorganized and internally preoccupied  Less sedated  Risperdal decreased to 1 mg PO BID yesterday  Plan is to continue Risperdal taper to discontinuation and add Abilify as 2nd antipsychotic to assist with ongoing psychosis and disorganization  Abilify has also been chosen to avoid over sedation and lethargy as experienced with Risperdal therapy  No discharge date at this time  Recommended Treatment: Continue with group therapy, milieu therapy and occupational therapy  Risks, benefits and possible side effects of Medications:   Patient does not verbalize understanding at this time and will require further explanation      Medications:   all current active meds have been reviewed, continue current psychiatric medications and current meds:   Current Facility-Administered Medications   Medication Dose Route Frequency    acetaminophen (TYLENOL) tablet 650 mg  650 mg Oral Q6H PRN    acetaminophen (TYLENOL) tablet 650 mg  650 mg Oral Q4H PRN    acetaminophen (TYLENOL) tablet 975 mg  975 mg Oral Q6H PRN    aluminum-magnesium hydroxide-simethicone (MYLANTA) oral suspension 30 mL  30 mL Oral Q4H PRN    haloperidol lactate (HALDOL) injection 2 5 mg  2 5 mg Intramuscular Q6H PRN Max 4/day    And    LORazepam (ATIVAN) injection 1 mg  1 mg Intramuscular Q6H PRN Max 4/day    And    benztropine (COGENTIN) injection 0 5 mg  0 5 mg Intramuscular Q6H PRN Max 4/day    LORazepam (ATIVAN) injection 2 mg  2 mg Intramuscular Q4H PRN Max 4/day    And    benztropine (COGENTIN) injection 1 mg  1 mg Intramuscular Q4H PRN Max 4/day    benztropine (COGENTIN) tablet 1 mg  1 mg Oral Q6H PRN    benztropine (COGENTIN) tablet 1 mg  1 mg Oral HS    chlorproMAZINE (THORAZINE) injection SOLN 50 mg  50 mg Intramuscular Q6H PRN    chlorproMAZINE (THORAZINE) tablet 75 mg  75 mg Oral Q6H PRN    [START ON 7/26/2022] cholecalciferol (VITAMIN D3) tablet 1,000 Units  1,000 Units Oral Daily    clonazePAM (KlonoPIN) tablet 1 mg  1 mg Oral BID    cyanocobalamin (VITAMIN B-12) tablet 1,000 mcg  1,000 mcg Oral Daily    divalproex sodium (DEPAKOTE) EC tablet 1,000 mg  1,000 mg Oral HS    ergocalciferol (VITAMIN D2) capsule 50,000 Units  50,000 Units Oral Weekly    haloperidol (HALDOL) tablet 10 mg  10 mg Oral HS    hydrOXYzine HCL (ATARAX) tablet 25 mg  25 mg Oral Q6H PRN Max 4/day    hydrOXYzine HCL (ATARAX) tablet 50 mg  50 mg Oral Q4H PRN Max 4/day    Or    LORazepam (ATIVAN) injection 1 mg  1 mg Intramuscular Q4H PRN    lithium carbonate capsule 600 mg  600 mg Oral HS    LORazepam (ATIVAN) tablet 1 mg  1 mg Oral Q6H PRN    Or    LORazepam (ATIVAN) injection 2 mg  2 mg Intramuscular Q6H PRN Max 3/day    metoprolol tartrate (LOPRESSOR) tablet 25 mg  25 mg Oral Q12H [address]    nicotine (NICODERM CQ) 7 mg/24hr TD 24 hr patch 1 patch  1 patch Transdermal Daily    nicotine polacrilex (NICORETTE) gum 2 mg  2 mg Oral Q2H PRN    OLANZapine (ZyPREXA ZYDIS) dispersible tablet 10 mg  10 mg Oral Q3H PRN    OLANZapine (ZyPREXA ZYDIS) dispersible tablet 5 mg  5 mg Oral Q3H PRN    OLANZapine (ZyPREXA) IM injection 10 mg  10 mg Intramuscular Q2H PRN    polyethylene glycol (MIRALAX) packet 17 g  17 g Oral Daily PRN    QUEtiapine (SEROquel) tablet 400 mg  400 mg Oral HS    risperiDONE (RisperDAL M-TAB) disintegrating tablet 1 mg  1 mg Oral BID    traZODone (DESYREL) tablet 150 mg  150 mg Oral HS PRN     Labs: I have personally reviewed all pertinent laboratory/tests results  Counseling / Coordination of Care  Total floor / unit time spent today 25 minutes  Greater than 50% of total time was spent with the patient and / or family counseling and / or coordination of care

## 2022-05-11 NOTE — PROGRESS NOTES
05/11/22 1000   Activity/Group Checklist   Group Self Esteem   Attendance Attended   Attendance Duration (min) 16-30   Interactions Interacted appropriately   Affect/Mood Appropriate;Calm   Goals Achieved Identified feelings; Able to listen to others; Able to recieve feedback  (Minimal interaction noted during group   Participation was appropriate when conversing )

## 2022-05-11 NOTE — PROGRESS NOTES
05/11/22 0904   Team Meeting   Meeting Type Daily Rounds   Team Members Present   Team Members Present Physician;Nurse; Other (Discipline and Name)   Physician Team Member Christiano Hammonds   Nursing Team Member Vilma Person   Social Work Team Member Tanner   Patient/Family Present   Patient Present No   Patient's Family Present No     Patient has improved sleep  His thoughts are disorganized and he continues to have bizarre behaviors  Medication management

## 2022-05-11 NOTE — PLAN OF CARE
Problem: Alteration in Thoughts and Perception  Goal: Treatment Goal: Gain control of psychotic behaviors/thinking, reduce/eliminate presenting symptoms and demonstrate improved reality functioning upon discharge  Outcome: Progressing  Goal: Verbalize thoughts and feelings  Description: Interventions:  - Promote a nonjudgmental and trusting relationship with the patient through active listening and therapeutic communication  - Assess patient's level of functioning, behavior and potential for risk  - Engage patient in 1 on 1 interactions  - Encourage patient to express fears, feelings, frustrations, and discuss symptoms    - [place] patient to reality, help patient recognize reality-based thinking   - Administer medications as ordered and assess for potential side effects  - Provide the patient education related to the signs and symptoms of the illness and desired effects of prescribed medications  Outcome: Progressing  Goal: Refrain from acting on delusional thinking/internal stimuli  Description: Interventions:  - Monitor patient closely, per order   - Utilize least restrictive measures   - Set reasonable limits, give positive feedback for acceptable   - Administer medications as ordered and monitor of potential side effects  Outcome: Progressing  Goal: Agree to be compliant with medication regime, as prescribed and report medication side effects  Description: Interventions:  - Offer appropriate PRN medication and supervise ingestion; conduct AIMS, as needed   Outcome: Progressing  Goal: Attend and participate in unit activities, including therapeutic, recreational, and educational groups  Description: Interventions:  -Encourage Visitation and family involvement in care  Outcome: Progressing  Goal: Recognize dysfunctional thoughts, communicate reality-based thoughts at the time of discharge  Description: Interventions:  - Provide medication and psycho-education to assist patient in compliance and developing insight into his/her illness   Outcome: Progressing  Goal: Complete daily ADLs, including personal hygiene independently, as able  Description: Interventions:  - Observe, teach, and assist patient with ADLS  - Monitor and promote a balance of rest/activity, with adequate nutrition and elimination   Outcome: Progressing     Problem: Depression  Goal: Treatment Goal: Demonstrate behavioral control of depressive symptoms, verbalize feelings of improved mood/affect, and adopt new coping skills prior to discharge  Outcome: Progressing  Goal: Verbalize thoughts and feelings  Description: Interventions:  - Assess and re-assess patient's level of risk   - Engage patient in 1:1 interactions, daily, for a minimum of 15 minutes   - Encourage patient to express feelings, fears, frustrations, hopes   Outcome: Progressing  Goal: Refrain from isolation  Description: Interventions:  - Develop a trusting relationship   - Encourage socialization   Outcome: Progressing  Goal: Refrain from self-neglect  Outcome: Progressing  Goal: Attend and participate in unit activities, including therapeutic, recreational, and educational groups  Description: Interventions:  - Provide therapeutic and educational activities daily, encourage attendance and participation, and document same in the medical record   Outcome: Progressing  Goal: Complete daily ADLs, including personal hygiene independently, as able  Description: Interventions:  - Observe, teach, and assist patient with ADLS  -  Monitor and promote a balance of rest/activity, with adequate nutrition and elimination   Outcome: Progressing     Problem: Risk for Violence/Aggression Toward Others  Goal: Treatment Goal: Refrain from acts of violence/aggression during length of stay, and demonstrate improved impulse control at the time of discharge  Outcome: Progressing  Goal: Verbalize thoughts and feelings  Description: Interventions:  - Assess and re-assess patient's level of risk, every waking shift  - Engage patient in 1:1 interactions, daily, for a minimum of 15 minutes   - Allow patient to express feelings and frustrations in a safe and non-threatening manner   - Establish rapport/trust with patient   Outcome: Progressing  Goal: Refrain from harming others  Outcome: Progressing  Goal: Refrain from destructive acts on the environment or property  Outcome: Progressing  Goal: Control angry outbursts  Description: Interventions:  - Monitor patient closely, per order  - Ensure early verbal de-escalation  - Monitor prn medication needs  - Set reasonable/therapeutic limits, outline behavioral expectations, and consequences   - Provide a non-threatening milieu, utilizing the least restrictive interventions   Outcome: Progressing  Goal: Attend and participate in unit activities, including therapeutic, recreational, and educational groups  Description: Interventions:  - Provide therapeutic and educational activities daily, encourage attendance and participation, and document same in the medical record   Outcome: Progressing  Goal: Identify appropriate positive anger management techniques  Description: Interventions:  - Offer anger management and coping skills groups   - Staff will provide positive feedback for appropriate anger control  Outcome: Progressing     Problem: Ineffective Coping  Goal: Participates in unit activities  Description: Interventions:  - Provide therapeutic environment   - Provide required programming   - Redirect inappropriate behaviors   Outcome: Progressing

## 2022-05-11 NOTE — NURSING NOTE
Patient compliant with morning medications and meals  Stated he had "an awesome night" speech is mumble and hard to understand  Denies any SI/HI  Denies any problems or concerns  Wandering the unit  Q 7 mins checks in place for safety  Will continue to monitor for behaviors and changes

## 2022-05-12 PROCEDURE — 99232 SBSQ HOSP IP/OBS MODERATE 35: CPT | Performed by: PSYCHIATRY & NEUROLOGY

## 2022-05-12 RX ORDER — CLONAZEPAM 0.5 MG/1
0.5 TABLET ORAL 2 TIMES DAILY
Status: DISCONTINUED | OUTPATIENT
Start: 2022-05-12 | End: 2022-05-20

## 2022-05-12 RX ADMIN — BENZTROPINE MESYLATE 1 MG: 1 TABLET ORAL at 20:34

## 2022-05-12 RX ADMIN — METOPROLOL TARTRATE 25 MG: 25 TABLET ORAL at 20:38

## 2022-05-12 RX ADMIN — CLONAZEPAM 1 MG: 1 TABLET ORAL at 08:10

## 2022-05-12 RX ADMIN — LITHIUM CARBONATE 600 MG: 300 CAPSULE, GELATIN COATED ORAL at 20:34

## 2022-05-12 RX ADMIN — METOPROLOL TARTRATE 25 MG: 25 TABLET ORAL at 08:10

## 2022-05-12 RX ADMIN — DIVALPROEX SODIUM 1000 MG: 500 TABLET, DELAYED RELEASE ORAL at 20:34

## 2022-05-12 RX ADMIN — RISPERIDONE 1 MG: 1 TABLET, ORALLY DISINTEGRATING ORAL at 08:10

## 2022-05-12 RX ADMIN — HALOPERIDOL 10 MG: 10 TABLET ORAL at 20:34

## 2022-05-12 RX ADMIN — CLONAZEPAM 0.5 MG: 0.5 TABLET ORAL at 17:01

## 2022-05-12 RX ADMIN — CYANOCOBALAMIN TAB 500 MCG 1000 MCG: 500 TAB at 08:10

## 2022-05-12 RX ADMIN — QUETIAPINE FUMARATE 400 MG: 200 TABLET ORAL at 20:34

## 2022-05-12 NOTE — PLAN OF CARE
Problem: Alteration in Thoughts and Perception  Goal: Treatment Goal: Gain control of psychotic behaviors/thinking, reduce/eliminate presenting symptoms and demonstrate improved reality functioning upon discharge  Outcome: Progressing  Goal: Verbalize thoughts and feelings  Description: Interventions:  - Promote a nonjudgmental and trusting relationship with the patient through active listening and therapeutic communication  - Assess patient's level of functioning, behavior and potential for risk  - Engage patient in 1 on 1 interactions  - Encourage patient to express fears, feelings, frustrations, and discuss symptoms    - [place] patient to reality, help patient recognize reality-based thinking   - Administer medications as ordered and assess for potential side effects  - Provide the patient education related to the signs and symptoms of the illness and desired effects of prescribed medications  Outcome: Progressing  Goal: Refrain from acting on delusional thinking/internal stimuli  Description: Interventions:  - Monitor patient closely, per order   - Utilize least restrictive measures   - Set reasonable limits, give positive feedback for acceptable   - Administer medications as ordered and monitor of potential side effects  Outcome: Progressing  Goal: Agree to be compliant with medication regime, as prescribed and report medication side effects  Description: Interventions:  - Offer appropriate PRN medication and supervise ingestion; conduct AIMS, as needed   Outcome: Progressing  Goal: Attend and participate in unit activities, including therapeutic, recreational, and educational groups  Description: Interventions:  -Encourage Visitation and family involvement in care  Outcome: Progressing  Goal: Recognize dysfunctional thoughts, communicate reality-based thoughts at the time of discharge  Description: Interventions:  - Provide medication and psycho-education to assist patient in compliance and developing insight into his/her illness   Outcome: Progressing  Goal: Complete daily ADLs, including personal hygiene independently, as able  Description: Interventions:  - Observe, teach, and assist patient with ADLS  - Monitor and promote a balance of rest/activity, with adequate nutrition and elimination   Outcome: Progressing     Problem: Depression  Goal: Treatment Goal: Demonstrate behavioral control of depressive symptoms, verbalize feelings of improved mood/affect, and adopt new coping skills prior to discharge  Outcome: Progressing  Goal: Verbalize thoughts and feelings  Description: Interventions:  - Assess and re-assess patient's level of risk   - Engage patient in 1:1 interactions, daily, for a minimum of 15 minutes   - Encourage patient to express feelings, fears, frustrations, hopes   Outcome: Progressing  Goal: Refrain from isolation  Description: Interventions:  - Develop a trusting relationship   - Encourage socialization   Outcome: Progressing  Goal: Refrain from self-neglect  Outcome: Progressing  Goal: Attend and participate in unit activities, including therapeutic, recreational, and educational groups  Description: Interventions:  - Provide therapeutic and educational activities daily, encourage attendance and participation, and document same in the medical record   Outcome: Progressing  Goal: Complete daily ADLs, including personal hygiene independently, as able  Description: Interventions:  - Observe, teach, and assist patient with ADLS  -  Monitor and promote a balance of rest/activity, with adequate nutrition and elimination   Outcome: Progressing     Problem: Risk for Violence/Aggression Toward Others  Goal: Treatment Goal: Refrain from acts of violence/aggression during length of stay, and demonstrate improved impulse control at the time of discharge  Outcome: Progressing  Goal: Verbalize thoughts and feelings  Description: Interventions:  - Assess and re-assess patient's level of risk, every waking shift  - Engage patient in 1:1 interactions, daily, for a minimum of 15 minutes   - Allow patient to express feelings and frustrations in a safe and non-threatening manner   - Establish rapport/trust with patient   Outcome: Progressing  Goal: Refrain from harming others  Outcome: Progressing  Goal: Refrain from destructive acts on the environment or property  Outcome: Progressing  Goal: Control angry outbursts  Description: Interventions:  - Monitor patient closely, per order  - Ensure early verbal de-escalation  - Monitor prn medication needs  - Set reasonable/therapeutic limits, outline behavioral expectations, and consequences   - Provide a non-threatening milieu, utilizing the least restrictive interventions   Outcome: Progressing  Goal: Attend and participate in unit activities, including therapeutic, recreational, and educational groups  Description: Interventions:  - Provide therapeutic and educational activities daily, encourage attendance and participation, and document same in the medical record   Outcome: Progressing  Goal: Identify appropriate positive anger management techniques  Description: Interventions:  - Offer anger management and coping skills groups   - Staff will provide positive feedback for appropriate anger control  Outcome: Progressing     Problem: Ineffective Coping  Goal: Participates in unit activities  Description: Interventions:  - Provide therapeutic environment   - Provide required programming   - Redirect inappropriate behaviors   Outcome: Progressing     Problem: DISCHARGE PLANNING  Goal: Discharge to home or other facility with appropriate resources  Description: INTERVENTIONS:  - Identify barriers to discharge w/patient and caregiver  - Arrange for needed discharge resources and transportation as appropriate  - Identify discharge learning needs (meds, wound care, etc )  - Refer to Case Management Department for coordinating discharge planning if the patient needs post-hospital services based on physician/advanced practitioner order or complex needs related to functional status, cognitive ability, or social support system  Outcome: Progressing     Problem: Nutrition/Hydration-ADULT  Goal: Nutrient/Hydration intake appropriate for improving, restoring or maintaining nutritional needs  Description: Monitor and assess patient's nutrition/hydration status for malnutrition  Collaborate with interdisciplinary team and initiate plan and interventions as ordered  Monitor patient's weight and dietary intake as ordered or per policy  Utilize nutrition screening tool and intervene as necessary  Determine patient's food preferences and provide high-protein, high-caloric foods as appropriate       INTERVENTIONS:  - Monitor oral intake, urinary output, labs, and treatment plans  - Assess nutrition and hydration status and recommend course of action  - Evaluate amount of meals eaten  - Assist patient with eating if necessary   - Allow adequate time for meals  - Recommend/ encourage appropriate diets, oral nutritional supplements, and vitamin/mineral supplements  - Order, calculate, and assess calorie counts as needed  - Recommend, monitor, and adjust tube feedings and TPN/PPN based on assessed needs  - Assess need for intravenous fluids  - Provide specific nutrition/hydration education as appropriate  - Include patient/family/caregiver in decisions related to nutrition  Outcome: Progressing

## 2022-05-12 NOTE — PROGRESS NOTES
Progress Note - Sanket Hammonds 31 y o  male MRN: [id number]    Unit/Bed#: [facility] 251-01 Encounter: [id number]        Subjective:   Patient seen and examined at bedside after reviewing the chart and discussing the case with the caring staff  Patient examined at bedside  Patient has no acute complaints  Physical Exam   Vitals: Blood pressure 115/78, pulse 84, temperature 97 5 °F (36 4 °C), temperature source Temporal, resp  rate 16, height 5' 6" (1 676 m), weight 78 3 kg (172 lb 9 6 oz), SpO2 100 %  ,Body mass index is 27 86 kg/m²  Constitutional:  Patient appears in no acute distress  HEENT: PERR, EOMI, MMM  Cardiovascular:  Regular rate, regular rhythm  Pulmonary/Chest: Effort normal and breath sounds normal    Abdomen: Soft, + BS, NT  Assessment/Plan:  Sanket Hammonds is a(n) 31y o  year old male with schizoaffective disorder bipolar type      1  Tobacco abuse  Patient has been put on nicotine transdermal patch 7 mg daily  2  Arthritis/headache  Patient may get Tylenol on as needed basis  3  GERD  Patient may take Mylanta as needed  4  Insomnia  Patient may get trazodone as needed  5  Vitamin-D deficiency  Patient started on vitamin D2 92256 units weekly for 14 weeks followed by vitamin D3 1000 units daily  6  Vitamin B12 deficiency  Patient started on vitamin B12 supplement  7  Tachycardia  Patient started on metoprolol tartrate 25 mg twice daily on 04/23/2022  Continue to monitor closely  The patient was discussed with Dr August Weaver and he is in agreement with the above note

## 2022-05-12 NOTE — PROGRESS NOTES
05/12/22 1000   Activity/Group Checklist   Group Life Skills   Attendance Attended   Attendance Duration (min) 0-15   Interactions Disorganized interaction   Affect/Mood Calm;MONICA   Goals Achieved Able to listen to others; Able to recieve feedback  (Minimal participation in group   Continued to encourage sharing feelings )

## 2022-05-12 NOTE — NURSING NOTE
Patient slept all night  No distress noted  Patient woke for a small amount of time and went back to bed  Safety checks continue

## 2022-05-12 NOTE — NURSING NOTE
Patient was quiet and cooperative  Patient social with staff and select peers  Patient slept last night and appears less sedated today  Staff support provided  Patient denies SI/HI  Q 7 minute safety checks maintained  Patient compliant with medications and groups  Staff will continue to monitor and support

## 2022-05-12 NOTE — NURSING NOTE
Patient withdrawn to room  Speech is mumbles, very hard to understand  Denies SI,HI,AVH, anxiety and depression  Safety checks continue Q 7 minutes

## 2022-05-12 NOTE — PROGRESS NOTES
05/12/22 0800   Activity/Group Checklist   Group Community meeting   Attendance Attended   Attendance Duration (min) 0-15   Interactions Disorganized interaction   Affect/Mood Calm;MONICA  (Bizarre behaviors observed during am group  Patient came up for a cup of coffee, took the cream and sugar, left without the coffee and started drinking the creamer and sugar from packets  Patient was redirected and handed a cup of juice instead )   Goals Achieved Able to listen to others; Able to recieve feedback

## 2022-05-13 PROCEDURE — 99232 SBSQ HOSP IP/OBS MODERATE 35: CPT | Performed by: HOSPITALIST

## 2022-05-13 RX ORDER — ARIPIPRAZOLE 5 MG/1
5 TABLET ORAL DAILY
Status: DISCONTINUED | OUTPATIENT
Start: 2022-05-13 | End: 2022-05-18

## 2022-05-13 RX ADMIN — HALOPERIDOL 10 MG: 10 TABLET ORAL at 20:36

## 2022-05-13 RX ADMIN — NICOTINE POLACRILEX 2 MG: 2 GUM, CHEWING BUCCAL at 18:58

## 2022-05-13 RX ADMIN — LITHIUM CARBONATE 450 MG: 300 CAPSULE, GELATIN COATED ORAL at 20:34

## 2022-05-13 RX ADMIN — QUETIAPINE FUMARATE 400 MG: 200 TABLET ORAL at 20:34

## 2022-05-13 RX ADMIN — CYANOCOBALAMIN TAB 500 MCG 1000 MCG: 500 TAB at 08:25

## 2022-05-13 RX ADMIN — CLONAZEPAM 0.5 MG: 0.5 TABLET ORAL at 08:26

## 2022-05-13 RX ADMIN — METOPROLOL TARTRATE 25 MG: 25 TABLET ORAL at 20:35

## 2022-05-13 RX ADMIN — METOPROLOL TARTRATE 25 MG: 25 TABLET ORAL at 08:25

## 2022-05-13 RX ADMIN — CLONAZEPAM 0.5 MG: 0.5 TABLET ORAL at 17:27

## 2022-05-13 RX ADMIN — BENZTROPINE MESYLATE 1 MG: 1 TABLET ORAL at 20:34

## 2022-05-13 RX ADMIN — NICOTINE 7 MG/24 HR DAILY TRANSDERMAL PATCH 1 PATCH: at 08:26

## 2022-05-13 RX ADMIN — DIVALPROEX SODIUM 1000 MG: 500 TABLET, DELAYED RELEASE ORAL at 20:34

## 2022-05-13 RX ADMIN — ARIPIPRAZOLE 5 MG: 5 TABLET ORAL at 10:33

## 2022-05-13 NOTE — PROGRESS NOTES
05/13/22 9464   Team Meeting   Meeting Type Daily Rounds   Team Members Present   Team Members Present Physician;Nurse;; Other (Discipline and Name)   Physician Team Member Jonelle Lefort   Nursing Team Member [facility]   Social Work Team Member Tanner   Other (Discipline and Name) Kristal Banerjee [facility]   Patient/Family Present   Patient Present No   Patient's Family Present No     Patient is confused, lethargic, medication changes

## 2022-05-13 NOTE — PROGRESS NOTES
Progress Note - Behavioral Health   Kevin Estela 31 y o  male MRN: @MRN   Unit/Bed#: [facility] 251-01 Encounter: [id number]      Report from staff regarding this patient received and discussed, and records reviewed prior to seeing this patient  Behavior over the last 24 hours: slowly improving  The patient participated in session with the writer but at times he would hard to understand because of some incoherence and his thought pattern  Patient was visible in the unit but did not interact with peers  Patient remains anxious, bizarre, cooperative with session, distracted, distressed, fearful, frustrated and guarded today  Sleep: slept better  Appetite: fair  Medication side effects:   Daytime sedation    Mental Status Evaluation:    Appearance:  dressed in hospital attire   Mood:  dysphoric, anxious   Affect: constricted    Speech:  decreased rate, slow   Thought Content:  paranoid ideation, intrusive thoughts, negative thinking   Perceptual Disturbances: denies auditory hallucinations when asked, does not appear responding to internal stimuli   Risk Potential: Suicidal ideation - None, contracts for safety on the unit  Homicidal ideation - None  Potential for aggression - No   Insight:  poor   Judgment: poor   Motor Activity: no abnormal movements         Laboratory results:  I have personally reviewed all pertinent laboratory results  Progress Toward Goals: no significant improvement    Assessment/Plan   Principal Problem:    Schizoaffective disorder, bipolar type ([facility] [address] )    Recommended Treatment:  Will continue to cross taper risperidone with Haldol,   Risperidone was not helpful to address the patient core symptoms of psychosis, because of the patient continuation of sedation daytime will also decrease clonazepam to 0 5 mg twice a day  Supportive therapy was provided to the patient  Planned medication and treatment changes: All current active medications have been reviewed    Continue treatment with group therapy, milieu therapy, occupational therapy and medication management  ** Please Note: This note has been constructed using a voice recognition system  **      BMP: No results for input(s): NA, K, CL, CO2, BUN in the last 72 hours      Invalid input(s): CREA, GLU  Vitals:    05/12/22 2038   BP: 126/86   Pulse: 96   Resp:    Temp:    SpO2:         Medication Administration - last 24 hours from 05/11/2022 2233 to 05/12/2022 2233       Date/Time Order Dose Route Action Action by     05/12/2022 0812 nicotine (NICODERM CQ) 7 mg/24hr TD 24 hr patch 1 patch 1 patch Transdermal Not Given Yadira Addison RN     05/12/2022 2200 divalproex sodium (DEPAKOTE) EC tablet 1,000 mg   Oral Canceled Entry Ann Koch LPN     17/04/4288 1408 divalproex sodium (DEPAKOTE) EC tablet 1,000 mg 1,000 mg Oral Given Ann Koch LPN     86/34/3115 0830 metoprolol tartrate (LOPRESSOR) tablet 25 mg 25 mg Oral Given Ann Koch LPN     41/82/3191 1080 metoprolol tartrate (LOPRESSOR) tablet 25 mg 25 mg Oral Given Yadira Addison RN     05/12/2022 0810 cyanocobalamin (VITAMIN B-12) tablet 1,000 mcg 1,000 mcg Oral Given Yadira Addison RN     05/12/2022 0810 clonazePAM (KlonoPIN) tablet 1 mg 1 mg Oral Given Yadira Addison RN     05/12/2022 2034 lithium carbonate capsule 600 mg 600 mg Oral Given Abby Sweeney LPN     32/86/7026 9091 QUEtiapine (SEROquel) tablet 400 mg   Oral Canceled Entry Ann Koch LPN     20/57/4960 2596 QUEtiapine (SEROquel) tablet 400 mg 400 mg Oral Given Abby Sweeney LPN     06/75/0492 0404 haloperidol (HALDOL) tablet 10 mg   Oral Canceled Entry Ann Koch LPN     99/64/0409 2573 haloperidol (HALDOL) tablet 10 mg 10 mg Oral Given Abby Sweeney LPN     20/18/8164 8535 benztropine (COGENTIN) tablet 1 mg   Oral Canceled Entry Ann Koch LPN     55/58/5260 2038 benztropine (COGENTIN) tablet 1 mg 1 mg Oral Given Ann Koch LPN     31/51/1924 0654 risperiDONE (RisperDAL M-TAB) disintegrating tablet 1 mg 1 mg Oral Given Kamilah Reece RN     05/12/2022 1701 clonazePAM (KlonoPIN) tablet 0 5 mg 0 5 mg Oral Given Kamilah Reece RN

## 2022-05-13 NOTE — PLAN OF CARE

## 2022-05-13 NOTE — PROGRESS NOTES
Progress Note - Mark Henriquez 31 y o  male MRN: [id number]    Unit/Bed#: [facility] 251-01 Encounter: [id number]        Subjective:   Patient seen and examined at bedside after reviewing the chart and discussing the case with the caring staff  Patient examined at bedside  Patient has no acute complaints  Physical Exam   Vitals: Blood pressure 114/84, pulse 91, temperature 98 2 °F (36 8 °C), temperature source Temporal, resp  rate 16, height 5' 6" (1 676 m), weight 78 3 kg (172 lb 9 6 oz), SpO2 98 %  ,Body mass index is 27 86 kg/m²  Constitutional:  Patient appears in no acute distress  HEENT: PERR, EOMI, MMM  Cardiovascular:  Regular rate, regular rhythm  Pulmonary/Chest: Effort normal and breath sounds normal    Abdomen: Soft, + BS, NT  Assessment/Plan:  Mark Henriquez is a(n) 31y o  year old male with schizoaffective disorder bipolar type      1  Tobacco abuse  Patient has been put on nicotine transdermal patch 7 mg daily  2  Arthritis/headache  Patient may get Tylenol on as needed basis  3  GERD  Patient may take Mylanta as needed  4  Insomnia  Patient may get trazodone as needed  5  Vitamin-D deficiency  Patient started on vitamin D2 57093 units weekly for 14 weeks followed by vitamin D3 1000 units daily  6  Vitamin B12 deficiency  Patient started on vitamin B12 supplement  7  Tachycardia  Patient started on metoprolol tartrate 25 mg twice daily on 04/23/2022  Continue to monitor closely  The patient was discussed with Dr Emi Gaston and he is in agreement with the above note

## 2022-05-13 NOTE — NURSING NOTE
Provided patient with medication education for Abilify  Pt will need reinforcement as there was no evidence of learning  Administered first dose 5 mg Abilify  po as prescribed  Risperidone is discontinued

## 2022-05-13 NOTE — NURSING NOTE
Patient visible on unit  Denies SI,HI,AVH, anxiety or depression  Patient also withdrawn to room at times  Safety checks Q 7 minutes continue

## 2022-05-13 NOTE — PROGRESS NOTES
Progress Note - Behavioral Health     Sheri Aden 31 y o  male MRN: [id number]   Unit/Bed#: [facility] 251-01 Encounter: [id number]    Behavior over the last 24 hours: unchanged  Janeann Line seen today, per staff report has been bizarre and nonsensical on the unit  Patient seen today continues to have disconnected thought process, rambling disconnected speech and poverty of thought content  Patient line of thinking is nonlinear and hard to follow  He is however pleasant and smiling during parts of interview process  Lacks insight and judgment  Disordered thought process does not allow for patient to have ability to for self-care  Sleep improving, appetite fair, side effect to medication -patient appears lethargic  Mental Status Evaluation:    Appearance:  disheveled, marginal hygiene, dressed in hospital attire, looks stated age   Behavior:  cooperative, bizarre   Speech:  slurred, garbled, impoverished   Mood:  anxious   Affect:  blunted   Thought Process:  disorganized, illogical   Associations: circumstantial associations   Thought Content:  negative thinking, ruminations   Perceptual Disturbances: denies auditory hallucinations when asked   Risk Potential: Suicidal ideation - None at present  Homicidal ideation - None at present   Sensorium:  oriented to person, place and time/date   Memory:  recent and remote memory grossly intact   Consciousness:  alert and awake   Attention: decreased concentration and decreased attention span   Insight:  limited   Judgment: impaired   Gait/Station: normal gait/station   Motor Activity: no abnormal movements     Vital signs in last 24 hours:    Temp:  [97 5 °F (36 4 °C)-98 2 °F (36 8 °C)] 98 2 °F (36 8 °C)  HR:  [84-96] 91  Resp:  [16] 16  BP: (114-126)/(78-86) 114/84    Laboratory results: I have personally reviewed all pertinent laboratory/tests results          Assessment/Plan   Principal Problem:    [facility]    Recommended Treatment: Planned medication and treatment changes:       All current active medications have been reviewed  Encourage group therapy, milieu therapy and occupational therapy  Behavioral Health checks every 7 minutes  Current Facility-Administered Medications   Medication Dose Route Frequency Provider Last Rate    acetaminophen  650 mg Oral Q6H PRN Chepe Latrice Medei, CRNP      acetaminophen  650 mg Oral Q4H PRN Chepe Latrice Medei, CRNP      acetaminophen  975 mg Oral Q6H PRN Chepe Latrice Medei, CRNP      aluminum-magnesium hydroxide-simethicone  30 mL Oral Q4H PRN Chepe Latrice Medei, CRNP      ARIPiprazole  5 mg Oral Daily Hazel Beebe MD      haloperidol lactate  2 5 mg Intramuscular Q6H PRN Max 4/day Ivanna M Medei, CRNP      And    LORazepam  1 mg Intramuscular Q6H PRN Max 4/day Chepe Latrice Medei, CRNP      And    benztropine  0 5 mg Intramuscular Q6H PRN Max 4/day Chepe Latrice Medei, CRNP      LORazepam  2 mg Intramuscular Q4H PRN Max 4/day Chepe Latrice Medei, CRNP      And    benztropine  1 mg Intramuscular Q4H PRN Max 4/day Chepe Latrice Medei, CRNP      benztropine  1 mg Oral Q6H PRN Chepe Latrice Medei, CRNP      benztropine  1 mg Oral HS Hazel Beebe MD      chlorproMAZINE  50 mg Intramuscular Q6H PRN Carlos Kumar MD      chlorproMAZINE  75 mg Oral Q6H PRN Carlos Kumar MD      Alvewillie Sauce ON 7/26/2022] cholecalciferol  1,000 Units Oral Daily Shanice Garcia PA-C      clonazePAM  0 5 mg Oral BID Carlos Kumar MD      cyanocobalamin  1,000 mcg Oral Daily Shanice Garcia PA-C      divalproex sodium  1,000 mg Oral HS Carlos Kumar MD      ergocalciferol  50,000 Units Oral Weekly Shanicedeo Garcia PA-C      haloperidol  10 mg Oral HS Hazel Beebe MD      hydrOXYzine HCL  25 mg Oral Q6H PRN Max 4/day Ivanna M Medei, CRNP      hydrOXYzine HCL  50 mg Oral Q4H PRN Max 4/day Chepe Latrice Medei, CRNP      Or    LORazepam  1 mg Intramuscular Q4H PRN Nena Faust, CRNP  lithium carbonate  450 mg Oral HS Raysa Jeff MD      LORazepam  1 mg Oral Q6H PRN BRIONNA Aquino      Or    LORazepam  2 mg Intramuscular Q6H PRN Max 3/day BRIONNA Aquino      metoprolol tartrate  25 mg Oral Q12H [address] Shanice Garcia PA-C      nicotine  1 patch Transdermal Daily BRIONNA Aquino      nicotine polacrilex  2 mg Oral Q2H PRN Shanice Garcia PA-C      OLANZapine  10 mg Oral Q3H PRN Brigidoaly White, MD      OLANZapine  5 mg Oral Q3H PRN Brigido Gain, MD      OLANZapine  10 mg Intramuscular Q2H PRN Brigido White, MD      polyethylene glycol  17 g Oral Daily PRN BRIONNA Aquino      QUEtiapine  400 mg Oral HS Brigidoaly White, MD      traZODone  150 mg Oral HS PRN BRIONNA Aquino         Risks / Benefits of Treatment:    Risks, benefits, and possible side effects of medications explained to patient and patient verbalizes understanding and agreement for treatment  Counseling / Coordination of Care: Total floor / unit time spent today 45 minutes  Greater than 50% of total time was spent with the patient and / or family counseling and / or coordination of care  A description of counseling / coordination of care:  Patient's progress discussed with staff in treatment team meeting  Medications, treatment progress and treatment plan reviewed with patient    Participation in 304 hearing  Raysa Jeff MD 05/13/22

## 2022-05-13 NOTE — PROGRESS NOTES
05/12/22 0919   Team Meeting   Meeting Type Daily Rounds   Team Members Present   Team Members Present Physician;Nurse;; Other (Discipline and Name)   Physician Team Member Shane garsia [place]   Nursing Team Member [facility]   Social Work Team Member Tanner   Other (Discipline and Name) Ignacia Diaz [facility]   Patient/Family Present   Patient Present No   Patient's Family Present No     Patient slept, appropriate , med changes due sedation  D/C next week

## 2022-05-14 PROCEDURE — 99232 SBSQ HOSP IP/OBS MODERATE 35: CPT | Performed by: PSYCHIATRY & NEUROLOGY

## 2022-05-14 RX ADMIN — ARIPIPRAZOLE 5 MG: 5 TABLET ORAL at 09:19

## 2022-05-14 RX ADMIN — HALOPERIDOL LACTATE 2.5 MG: 5 INJECTION, SOLUTION INTRAMUSCULAR at 12:58

## 2022-05-14 RX ADMIN — LITHIUM CARBONATE 450 MG: 300 CAPSULE, GELATIN COATED ORAL at 21:01

## 2022-05-14 RX ADMIN — CLONAZEPAM 0.5 MG: 0.5 TABLET ORAL at 09:18

## 2022-05-14 RX ADMIN — CYANOCOBALAMIN TAB 500 MCG 1000 MCG: 500 TAB at 09:18

## 2022-05-14 RX ADMIN — QUETIAPINE FUMARATE 400 MG: 200 TABLET ORAL at 21:01

## 2022-05-14 RX ADMIN — ACETAMINOPHEN 975 MG: 325 TABLET ORAL at 12:37

## 2022-05-14 RX ADMIN — METOPROLOL TARTRATE 25 MG: 25 TABLET ORAL at 09:19

## 2022-05-14 RX ADMIN — CHLORPROMAZINE HYDROCHLORIDE 75 MG: 25 TABLET, SUGAR COATED ORAL at 16:37

## 2022-05-14 RX ADMIN — LORAZEPAM 2 MG: 2 INJECTION INTRAMUSCULAR; INTRAVENOUS at 12:58

## 2022-05-14 RX ADMIN — CLONAZEPAM 0.5 MG: 0.5 TABLET ORAL at 18:00

## 2022-05-14 RX ADMIN — TRAZODONE HYDROCHLORIDE 150 MG: 150 TABLET ORAL at 21:02

## 2022-05-14 RX ADMIN — DIVALPROEX SODIUM 1000 MG: 500 TABLET, DELAYED RELEASE ORAL at 21:01

## 2022-05-14 RX ADMIN — METOPROLOL TARTRATE 25 MG: 25 TABLET ORAL at 21:01

## 2022-05-14 RX ADMIN — HALOPERIDOL 10 MG: 10 TABLET ORAL at 21:02

## 2022-05-14 RX ADMIN — BENZTROPINE MESYLATE 0.5 MG: 1 INJECTION INTRAMUSCULAR; INTRAVENOUS at 12:59

## 2022-05-14 RX ADMIN — BENZTROPINE MESYLATE 1 MG: 1 TABLET ORAL at 21:02

## 2022-05-14 NOTE — NURSING NOTE
Patient visible on unit  Pleasant and cooperative  Denies SI,HI,AVH, anxiety or depression  Safety checks Q 7 minutes continue

## 2022-05-14 NOTE — NURSING NOTE
Patient slept through out the night  Easy unlabored breathing noted  No behaviors this shift  No PRNs required  Safety checks continue

## 2022-05-14 NOTE — NURSING NOTE
Mood labile  Pt behaviors increasingly acute throughout shift  Easily agitates  Paranoid and suspicious  Argumentative and confrontational with peers  Yelling multiple times in hallways at peers  Inappropriate behaviors for situation  Screaming threatening things at peers  Increasingly difficult to redirect by staff  Administered 2 5 mg haldol IM, ativan 2 mg IM and cogentin 0 5 mg IM for acute agitation and anxiety   Will evaluate effectiveness of medications/

## 2022-05-14 NOTE — PROGRESS NOTES
Progress Note - Behavioral Health   Mar Miller 31 y o  male MRN: [id number]  Unit/Bed#: [facility] 251-01 Encounter: [id number]    Assessment/Plan   Principal Problem:    Schizoaffective disorder, bipolar type [facility])  Patient is interviewed and hallway with his permission and appears acutely agitated yelling at peer that is on the phone talking to her family  Did try to redirect patient and explained to him that this other individual was simply speaking to their family  Reports significant paranoia and believes that she was speaking to him  Patient did pause and seemed to gain some understanding of this and directed his attention more towards interview with this provider  Mood continues to be unstable at times despite being on multiple mood stabilizing agents  Patient was recently started on Abilify and contact with primary team indicates that he will be gradually weaned off of Seroquel next week  Will plan to decrease patient's lithium level tomorrow as stabilization on Depakote will likely be sufficient  Most recent valproic acid drawn on 04/26/2022 was within therapeutic range at 108  Patient is reportedly improved since admission but continues to have tangental thinking  Some profane language and agitation are observed but patient is overall redirectable  Denies any safety concerning symptoms or hallucinogenic material but patient does seem to be responding to internal stimuli  Will continue rest of medication regimen without change at this time  Will plan to decrease patient's lithium level further tomorrow  Will continue monitor  Recommended Treatment:   1) Continue Haldol 10 mg PO QHS for mood stabilization, agitation, and psychosis  2) Continue Abilify 5 mg PO QD for mood stabilization and psychosis  3) Continue Seroquel 400 mg PO QHS for mood stabilization, psychosis, and sleep support  Primary team with decrease next week gradually  4) Continue Depakote 1000 mg PO QHS for mood stabilization   Most recent VPA level on 4/26/22 was within therapeutic range at 108   5) Continue Cogentin 1 mg PO QHS for EPS prophylaxis  6) Continue Klonopin 0 5 mg PO BID for anxiety and agitation  7) Continue Lithium 450 mg PO QD for mood stabilization  Plan for further decrease of dose tomorrow  Most recent Lithium level was in therapeutic range at 0 7  Continue with group therapy, milieu therapy and occupational therapy  Continue frequent safety checks and vitals per unit protocol  Case discussed with treatment team   Risks, benefits and possible side effects of Medications: Risks, benefits, and possible side effects of medications have been explained to the patient, who verbalizes understanding    ------------------------------------------------------------    Subjective: Per nursing report, Guillermina Fu has been somewhat cooperative on the unit and compliant with medications  Today, Guillermina Fu is consenting for safety on the unit  He reports that his current mood is what the fuck is that girl talking about    Patient points to other peer sitting at phone and speaking with their significant other  Reports that he believes this person is talking to him or about him  When this was discussed further he was able to be redirected  States that he is afraid of people that are talking on the phone  Randomly states I love [employer]    Reports that he is eating and sleeping without difficulty  Denies any side effects to medication  States that he is stuttering currently but also reports that this is normal for him  Denies any side effects to his medication regimen or other physical symptoms at this time  Per nursing staff, patient has been sleeping better over the last 2 days  Speech is mumbled and he is walking in a stupor like he is is on baby  Overall has improved with psychotic symptoms since admission      Progress Toward Goals: unchanged    Psychiatric Review of Systems:  Behavior over the last 24 hours: unchanged  Sleep: normal  Appetite: adequate  Medication side effects: none verbalized  ROS: Complete review of systems is negative except as noted above      Vital signs in last 24 hours:  Temp:  [97 5 °F (36 4 °C)] 97 5 °F (36 4 °C)  HR:  [81-85] 85  Resp:  [16] 16  BP: (113-135)/(78-79) 135/78    Mental Status Exam:  Appearance:  alert, intermittant eye contact, appears stated age, disheveled, overweight, straight black hair and wearing blanket over shoulders   Behavior:  cooperative, guarded, evasive and standing in halway starring at patient on the phone   Motor: no abnormal movements, restless and fidgety, psychomotor agitation and normal gait and balance   Speech:  spontaneous, increased rate, loud, coherent and profane   Mood:  "what the fuck is that girl talking about "   Affect:  mood-congruent, labile, dysphoric, anxious, angry and elevated and agitated   Thought Process:  disorganized, illogical, tangential   Thought Content: paranoid ideation   Perceptual disturbances: no reported hallucinations and appears to be responding to internal stimuli   Risk Potential: No active or passive suicidal or homicidal ideation was verbalized during interview, Low potential for aggression based on previous behavior   Cognition: oriented to self and situation, memory impaired due to acute psychosis/paranoia, appears to be below average intelligence, impaired abstract reasoning, attention span appeared shorter than expected for age and cognition not formally tested   Insight:  Limited   Judgment: Limited     Current Medications:  Current Facility-Administered Medications   Medication Dose Route Frequency Provider Last Rate    acetaminophen  650 mg Oral Q6H PRN [place] Medei, CRNP      acetaminophen  650 mg Oral Q4H PRN Nolia [place]iza Medei, CRNP      acetaminophen  975 mg Oral Q6H PRN Nolia [place] Medei, CRNP      aluminum-magnesium hydroxide-simethicone  30 mL Oral Q4H PRN Nolia Luiza Medei, CRNP      ARIPiprazole  5 mg Oral Daily Liu Cancino MD      haloperidol lactate  2 5 mg Intramuscular Q6H PRN Max 4/day Ivanna M Medei, CRNP      And    LORazepam  1 mg Intramuscular Q6H PRN Max 4/day Zelphia Vaughn Medei, CRNP      And    benztropine  0 5 mg Intramuscular Q6H PRN Max 4/day Zelphia Vaughn Medei, CRNP      LORazepam  2 mg Intramuscular Q4H PRN Max 4/day Zelphia Vaughn Medei, CRNP      And    benztropine  1 mg Intramuscular Q4H PRN Max 4/day Zelphia Vaughn Medei, CRNP      benztropine  1 mg Oral Q6H PRN Zelphia Vaughn Medei, CRNP      benztropine  1 mg Oral HS Tuyet Marks MD      chlorproMAZINE  50 mg Intramuscular Q6H PRN Katey Chirinos MD      chlorproMAZINE  75 mg Oral Q6H PRN MD Angelita Andrade ON 7/26/2022] cholecalciferol  1,000 Units Oral Daily Shanice Garcia PA-C      clonazePAM  0 5 mg Oral BID Katey Chirinos MD      cyanocobalamin  1,000 mcg Oral Daily Shanice Garcia PA-C      divalproex sodium  1,000 mg Oral HS Katey Chirinos MD      ergocalciferol  50,000 Units Oral Weekly Shanice Garcia PA-C      haloperidol  10 mg Oral HS Tuyet Marks MD      hydrOXYzine HCL  25 mg Oral Q6H PRN Max 4/day Ivanna M Medei, CRNP      hydrOXYzine HCL  50 mg Oral Q4H PRN Max 4/day Zelphia Vaughn Medei, CRNP      Or    LORazepam  1 mg Intramuscular Q4H PRN Zelphia Vaughn Medei, CRNP      lithium carbonate  450 mg Oral HS Tuyet Marks MD      LORazepam  1 mg Oral Q6H PRN Zelphia Vaughn Medei, CRNP      Or    LORazepam  2 mg Intramuscular Q6H PRN Max 3/day Zelphia Vaughn Medei, CRNP      metoprolol tartrate  25 mg Oral Q12H [address] Shanice Garcia PA-C      nicotine  1 patch Transdermal Daily Zelphia Vaughn Medei, CRNP      nicotine polacrilex  2 mg Oral Q2H PRN Shanice Garcia PA-C      OLANZapine  10 mg Oral Q3H PRN Katey Chirinos MD      OLANZapine  5 mg Oral Q3H PRN Katey Chirinos MD      OLANZapine  10 mg Intramuscular Q2H PRN MD Hanna Andrade polyethylene glycol  17 g Oral Daily PRN BRIONNA Granado      QUEtiapine  400 mg Oral HS Mary Real MD      traZODone  150 mg Oral HS PRN BRIONNA Wong         Behavioral Health Medications: all current active meds have been reviewed  Changes as in plan section above  Laboratory results:  I have personally reviewed all pertinent laboratory/tests results  No results found for this or any previous visit (from the past 48 hour(s))       Rusty Wilde DO

## 2022-05-14 NOTE — NURSING NOTE
Pt pushing on exit doors in hallways setting off safety alarms  Argumentative with redirection by staff  Confrontational with peers  Administered thorazine 75 mg po prn  Pt compliant with medications as prescribed and mouth checks  Safety precautions maintained  Liu continue to monitor and assess

## 2022-05-14 NOTE — PROGRESS NOTES
Progress Note - Jaye Guzmán 31 y o  male MRN: [id number]    Unit/Bed#: [facility] 251-01 Encounter: [id number]        Subjective:   Patient seen and examined at bedside after reviewing the chart and discussing the case with the caring staff  Patient examined at bedside  Patient has no acute complaints  Physical Exam   Vitals: Blood pressure 135/78, pulse 85, temperature 97 5 °F (36 4 °C), temperature source Temporal, resp  rate 16, height 5' 6" (1 676 m), weight 78 3 kg (172 lb 9 6 oz), SpO2 99 %  ,Body mass index is 27 86 kg/m²  Constitutional:  Patient appears in no acute distress  HEENT: PERR, EOMI, MMM  Cardiovascular:  Regular rate, regular rhythm  Pulmonary/Chest: Effort normal and breath sounds normal    Abdomen: Soft, + BS, NT  Assessment/Plan:  Jaye Guzmán is a(n) 31y o  year old male with schizoaffective disorder bipolar type      1  Tobacco abuse  Patient has been put on nicotine transdermal patch 7 mg daily  2  Arthritis/headache  Patient may get Tylenol on as needed basis  3  GERD  Patient may take Mylanta as needed  4  Insomnia  Patient may get trazodone as needed  5  Vitamin-D deficiency  Patient started on vitamin D2 93508 units weekly for 14 weeks followed by vitamin D3 1000 units daily  6  Vitamin B12 deficiency  Patient started on vitamin B12 supplement  7  Tachycardia  Patient started on metoprolol tartrate 25 mg twice daily on 04/23/2022  Continue to monitor closely

## 2022-05-15 LAB — VALPROATE SERPL-MCNC: 93 UG/ML (ref 50–100)

## 2022-05-15 PROCEDURE — 99232 SBSQ HOSP IP/OBS MODERATE 35: CPT | Performed by: PSYCHIATRY & NEUROLOGY

## 2022-05-15 PROCEDURE — 80164 ASSAY DIPROPYLACETIC ACD TOT: CPT | Performed by: PSYCHIATRY & NEUROLOGY

## 2022-05-15 RX ORDER — LITHIUM CARBONATE 300 MG/1
300 CAPSULE ORAL
Status: DISCONTINUED | OUTPATIENT
Start: 2022-05-15 | End: 2022-05-17

## 2022-05-15 RX ADMIN — METOPROLOL TARTRATE 25 MG: 25 TABLET ORAL at 10:06

## 2022-05-15 RX ADMIN — HALOPERIDOL LACTATE 2.5 MG: 5 INJECTION, SOLUTION INTRAMUSCULAR at 00:32

## 2022-05-15 RX ADMIN — CYANOCOBALAMIN TAB 500 MCG 1000 MCG: 500 TAB at 10:05

## 2022-05-15 RX ADMIN — CHLORPROMAZINE HYDROCHLORIDE 75 MG: 25 TABLET, SUGAR COATED ORAL at 04:34

## 2022-05-15 RX ADMIN — ARIPIPRAZOLE 5 MG: 5 TABLET ORAL at 10:05

## 2022-05-15 RX ADMIN — CLONAZEPAM 0.5 MG: 0.5 TABLET ORAL at 18:20

## 2022-05-15 RX ADMIN — CLONAZEPAM 0.5 MG: 0.5 TABLET ORAL at 10:07

## 2022-05-15 RX ADMIN — LORAZEPAM 1 MG: 2 INJECTION INTRAMUSCULAR; INTRAVENOUS at 00:32

## 2022-05-15 RX ADMIN — BENZTROPINE MESYLATE 0.5 MG: 1 INJECTION INTRAMUSCULAR; INTRAVENOUS at 00:32

## 2022-05-15 NOTE — NURSING NOTE
Patient observed  With broken/no sleep throughout the night   Patient required frequent orientation ba0ck to his room as he attempted numerous tomes to go into his peers room  Patient required frequent redirection, required several prns po and via IM

## 2022-05-15 NOTE — NURSING NOTE
Pt mood and behaviors labile and inappropriate for current situation  Speech mumbled at times but than excessively loud  Yelling and screaming non sensible things in hallways  Agitates easily  Hypervigilant  Confrontational and threatening towards peers  Trying to walk into other patient rooms  Difficult to redirect at times  Easily distractible  Poor focus  Needs prompting with meals  Needs prompting during meals do to poor focus and disorganization ate 0% breakfast  75 % of lunch with prompting by RN since patient was attempting to put deodorant on burger like it was condiments  Pt responding well to simple short direction  Pt also inappropriately nude from waist down multiple times this shift needing redirection and frequent reminders to put pants on  Pt compliant with medications as prescribed but despite poor sleep patient unable to relax  Appears restless and confused  Pt laid down for 45 minutes this shift in late afternoon  Otherwise no sleep this shift  Safety precautions maintained  Will continue to monitor and assess

## 2022-05-15 NOTE — PLAN OF CARE
Problem: Alteration in Thoughts and Perception  Goal: Treatment Goal: Gain control of psychotic behaviors/thinking, reduce/eliminate presenting symptoms and demonstrate improved reality functioning upon discharge  Outcome: Progressing  Goal: Verbalize thoughts and feelings  Description: Interventions:  - Promote a nonjudgmental and trusting relationship with the patient through active listening and therapeutic communication  - Assess patient's level of functioning, behavior and potential for risk  - Engage patient in 1 on 1 interactions  - Encourage patient to express fears, feelings, frustrations, and discuss symptoms    - [place] patient to reality, help patient recognize reality-based thinking   - Administer medications as ordered and assess for potential side effects  - Provide the patient education related to the signs and symptoms of the illness and desired effects of prescribed medications  Outcome: Progressing  Goal: Refrain from acting on delusional thinking/internal stimuli  Description: Interventions:  - Monitor patient closely, per order   - Utilize least restrictive measures   - Set reasonable limits, give positive feedback for acceptable   - Administer medications as ordered and monitor of potential side effects  Outcome: Progressing  Goal: Agree to be compliant with medication regime, as prescribed and report medication side effects  Description: Interventions:  - Offer appropriate PRN medication and supervise ingestion; conduct AIMS, as needed   Outcome: Progressing  Goal: Attend and participate in unit activities, including therapeutic, recreational, and educational groups  Description: Interventions:  -Encourage Visitation and family involvement in care  Outcome: Progressing  Goal: Recognize dysfunctional thoughts, communicate reality-based thoughts at the time of discharge  Description: Interventions:  - Provide medication and psycho-education to assist patient in compliance and developing insight into his/her illness   Outcome: Progressing  Goal: Complete daily ADLs, including personal hygiene independently, as able  Description: Interventions:  - Observe, teach, and assist patient with ADLS  - Monitor and promote a balance of rest/activity, with adequate nutrition and elimination   Outcome: Progressing

## 2022-05-15 NOTE — NURSING NOTE
Patient given po thorazine for increased agitation , Patient observed in the hallways with severe confusion , walking into his peers room ,yelling , internally preoccupied, attempting to open the nurses station door  Patient redircted frequently through the night
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
## 2022-05-15 NOTE — PLAN OF CARE
Problem: Alteration in Thoughts and Perception  Goal: Treatment Goal: Gain control of psychotic behaviors/thinking, reduce/eliminate presenting symptoms and demonstrate improved reality functioning upon discharge  5/15/2022 1927 by Jolene Ge RN  Outcome: Progressing  5/15/2022 1924 by Jolene Ge RN  Outcome: Progressing  Goal: Verbalize thoughts and feelings  Description: Interventions:  - Promote a nonjudgmental and trusting relationship with the patient through active listening and therapeutic communication  - Assess patient's level of functioning, behavior and potential for risk  - Engage patient in 1 on 1 interactions  - Encourage patient to express fears, feelings, frustrations, and discuss symptoms    - [place] patient to reality, help patient recognize reality-based thinking   - Administer medications as ordered and assess for potential side effects  - Provide the patient education related to the signs and symptoms of the illness and desired effects of prescribed medications  5/15/2022 1927 by Jolene Ge RN  Outcome: Not Progressing  5/15/2022 1924 by Jolene Ge RN  Outcome: Progressing  Goal: Refrain from acting on delusional thinking/internal stimuli  Description: Interventions:  - Monitor patient closely, per order   - Utilize least restrictive measures   - Set reasonable limits, give positive feedback for acceptable   - Administer medications as ordered and monitor of potential side effects  5/15/2022 1927 by Jolene Ge RN  Outcome: Not Progressing  5/15/2022 1924 by Jolene Ge RN  Outcome: Progressing  Goal: Agree to be compliant with medication regime, as prescribed and report medication side effects  Description: Interventions:  - Offer appropriate PRN medication and supervise ingestion; conduct AIMS, as needed   5/15/2022 1927 by Jolene Ge RN  Outcome: Progressing  5/15/2022 1924 by Jolene Ge RN  Outcome: Progressing  Goal: Attend and participate in unit activities, including therapeutic, recreational, and educational groups  Description: Interventions:  -Encourage Visitation and family involvement in care  5/15/2022 1927 by Lance Yeung RN  Outcome: Not Progressing  5/15/2022 1924 by Lance Yeung RN  Outcome: Progressing  Goal: Recognize dysfunctional thoughts, communicate reality-based thoughts at the time of discharge  Description: Interventions:  - Provide medication and psycho-education to assist patient in compliance and developing insight into his/her illness   5/15/2022 1927 by Lance Yeung RN  Outcome: Not Progressing  5/15/2022 1924 by Lance Yeung RN  Outcome: Progressing  Goal: Complete daily ADLs, including personal hygiene independently, as able  Description: Interventions:  - Observe, teach, and assist patient with ADLS  - Monitor and promote a balance of rest/activity, with adequate nutrition and elimination   5/15/2022 1927 by Lance Yeung RN  Outcome: Not Progressing  5/15/2022 1924 by Lance Yeung RN  Outcome: Progressing

## 2022-05-15 NOTE — NURSING NOTE
Patient given Po HAC for increased agitation , restless, yelling in the halls, walking into his peers room  Patient required frequent redirection  IM HAC tolerated, effective for less than one hour , pt  Rested in bed asleep for a short time

## 2022-05-15 NOTE — NURSING NOTE
Juancarlos Hinton was visible on the unit , he is awake, alert to person but is confused as to time and place,  drowsy at times  His mood is labile , controlled , he is also severely confused  Juancarlos Hinton made statements about  a peer telling him that the was currently in [facility] via Manuel and Colleen, he discussed with this writer on how he came to be in [facility]  This writer reassured Siria Ralph that he was in the [address] , he was also encouraged to seek out staff for any questions and  orientation confusion, Lawrence thanked this writer  He contnued to walk the halls quietly , observed talking to himself at times or randomly  intruding conversations amongst other peers  Good with medications, consumed his evening snack without difficulty  Staff continues to monitor per unit protocol

## 2022-05-15 NOTE — PLAN OF CARE
Problem: Alteration in Thoughts and Perception  Goal: Treatment Goal: Gain control of psychotic behaviors/thinking, reduce/eliminate presenting symptoms and demonstrate improved reality functioning upon discharge  Outcome: Progressing  Goal: Verbalize thoughts and feelings  Description: Interventions:  - Promote a nonjudgmental and trusting relationship with the patient through active listening and therapeutic communication  - Assess patient's level of functioning, behavior and potential for risk  - Engage patient in 1 on 1 interactions  - Encourage patient to express fears, feelings, frustrations, and discuss symptoms    - [place] patient to reality, help patient recognize reality-based thinking   - Administer medications as ordered and assess for potential side effects  - Provide the patient education related to the signs and symptoms of the illness and desired effects of prescribed medications  Outcome: Progressing  Goal: Refrain from acting on delusional thinking/internal stimuli  Description: Interventions:  - Monitor patient closely, per order   - Utilize least restrictive measures   - Set reasonable limits, give positive feedback for acceptable   - Administer medications as ordered and monitor of potential side effects  Outcome: Progressing  Goal: Agree to be compliant with medication regime, as prescribed and report medication side effects  Description: Interventions:  - Offer appropriate PRN medication and supervise ingestion; conduct AIMS, as needed   Outcome: Progressing  Goal: Attend and participate in unit activities, including therapeutic, recreational, and educational groups  Description: Interventions:  -Encourage Visitation and family involvement in care  Outcome: Progressing  Goal: Recognize dysfunctional thoughts, communicate reality-based thoughts at the time of discharge  Description: Interventions:  - Provide medication and psycho-education to assist patient in compliance and developing insight into his/her illness   Outcome: Progressing  Goal: Complete daily ADLs, including personal hygiene independently, as able  Description: Interventions:  - Observe, teach, and assist patient with ADLS  - Monitor and promote a balance of rest/activity, with adequate nutrition and elimination   Outcome: Progressing

## 2022-05-15 NOTE — PROGRESS NOTES
Progress Note - Stephania Will 31 y o  male MRN: [id number]    Unit/Bed#: [facility] 251-01 Encounter: [id number]        Subjective:   Patient seen and examined at bedside after reviewing the chart and discussing the case with the caring staff  Patient examined at bedside  Patient has no acute complaints  Physical Exam   Vitals: Blood pressure 105/72, pulse 90, temperature 98 2 °F (36 8 °C), temperature source Temporal, resp  rate 16, height 5' 6" (1 676 m), weight 78 3 kg (172 lb 9 6 oz), SpO2 98 %  ,Body mass index is 27 86 kg/m²  Constitutional:  Patient appears in no acute distress  HEENT: PERR, EOMI, MMM  Cardiovascular:  Regular rate, regular rhythm  Pulmonary/Chest: Effort normal and breath sounds normal    Abdomen: Soft, + BS, NT  Assessment/Plan:  Stephania Will is a(n) 31y o  year old male with schizoaffective disorder bipolar type      1  Tobacco abuse  Patient has been put on nicotine transdermal patch 7 mg daily  2  Arthritis/headache  Patient may get Tylenol on as needed basis  3  GERD  Patient may take Mylanta as needed  4  Insomnia  Patient may get trazodone as needed  5  Vitamin-D deficiency  Patient started on vitamin D2 07958 units weekly for 14 weeks followed by vitamin D3 1000 units daily  6  Vitamin B12 deficiency  Patient started on vitamin B12 supplement  7  Tachycardia  Patient started on metoprolol tartrate 25 mg twice daily on 04/23/2022  Continue to monitor closely

## 2022-05-16 PROCEDURE — 99232 SBSQ HOSP IP/OBS MODERATE 35: CPT | Performed by: NURSE PRACTITIONER

## 2022-05-16 RX ORDER — QUETIAPINE FUMARATE 200 MG/1
200 TABLET, FILM COATED ORAL
Status: DISCONTINUED | OUTPATIENT
Start: 2022-05-16 | End: 2022-05-21

## 2022-05-16 RX ADMIN — ARIPIPRAZOLE 5 MG: 5 TABLET ORAL at 08:50

## 2022-05-16 RX ADMIN — ERGOCALCIFEROL 50000 UNITS: 1.25 CAPSULE ORAL at 08:50

## 2022-05-16 RX ADMIN — METOPROLOL TARTRATE 25 MG: 25 TABLET ORAL at 08:50

## 2022-05-16 RX ADMIN — CLONAZEPAM 0.5 MG: 0.5 TABLET ORAL at 08:50

## 2022-05-16 RX ADMIN — CYANOCOBALAMIN TAB 500 MCG 1000 MCG: 500 TAB at 08:50

## 2022-05-16 NOTE — PROGRESS NOTES
Progress Note - Behavioral Health   Steve Fair 31 y o  male MRN: [id number]  Unit/Bed#: [facility] 251-01 Encounter: [id number]    Assessment/Plan   Principal Problem:    Schizoaffective disorder, bipolar type ([address] )      Behavior over the last 24 hours:  unchanged  Sleep:  Improving  Appetite:  Decreased  Medication side effects: No  ROS: nausea and all other systems are negative    Jewell Burrows was seen this morning for psychiatric follow-up  He appeared less sedated and attempted to engage in conversation  Speech remains disorganized and intelligible at times  No longer perseverative on discharge  Mood is controlled with no episodes of agitation or aggression  Sleep significantly improved  Remains compliant with medications although appetite has been decreased  Jewell Burrows did complain of feeling nauseous earlier this morning  Patient denied anxiety or depression, nor did he appear internally preoccupied  Mental Status Evaluation:  Appearance:  age appropriate, casually dressed and Less sedated, improved eye contact   Behavior:  Cooperative, psychomotor retardation, redirectable   Speech:  soft and Intelligible at times   Mood:  decreased range   Affect:  blunted and redirectable   Thought Process:  disorganized   Thought Content:  No overt delusions or paranoia   Perceptual Disturbances: Denies AVH, did not appear internally preoccupied   Risk Potential: Suicidal Ideations none  Homicidal Ideations none  Potential for Aggression No   Sensorium:  person and place   Memory:  recent and remote memory: unable to assess due to lack of cooperation, patient does not answer   Consciousness:  alert and awake , less sedated   Attention: Decreased attention/concentration   Insight:  impaired due to Psychosis   Judgment: impaired due to Psychosis   Gait/Station: slow   Motor Activity: no abnormal movements     Progress Toward Goals:  Slight improvement  Sleep improving, less sedated    Thoughts and speech remain disorganized, nonsensical at times  Plan is to taper Seroquel 200 mg PO QHS with plan to eventually discontinue and utilize Haldol and Abilify in combination therapy for psychosis and disorganization  At this time, patient continues to require the use of multiple antipsychotic for psychosis and disorganization  Plan is to also further taper lithium with plan to discontinue  No discharge date at this time  Recommended Treatment: Continue with group therapy, milieu therapy and occupational therapy  Risks, benefits and possible side effects of Medications:   Patient does not verbalize understanding at this time and will require further explanation        Medications:   Decrease Seroquel 200mg PO QHS  Increase Haldol 15mg PO QHS  all current active meds have been reviewed and current meds:   Current Facility-Administered Medications   Medication Dose Route Frequency    acetaminophen (TYLENOL) tablet 650 mg  650 mg Oral Q6H PRN    acetaminophen (TYLENOL) tablet 650 mg  650 mg Oral Q4H PRN    acetaminophen (TYLENOL) tablet 975 mg  975 mg Oral Q6H PRN    aluminum-magnesium hydroxide-simethicone (MYLANTA) oral suspension 30 mL  30 mL Oral Q4H PRN    ARIPiprazole (ABILIFY) tablet 5 mg  5 mg Oral Daily    haloperidol lactate (HALDOL) injection 2 5 mg  2 5 mg Intramuscular Q6H PRN Max 4/day    And    LORazepam (ATIVAN) injection 1 mg  1 mg Intramuscular Q6H PRN Max 4/day    And    benztropine (COGENTIN) injection 0 5 mg  0 5 mg Intramuscular Q6H PRN Max 4/day    LORazepam (ATIVAN) injection 2 mg  2 mg Intramuscular Q4H PRN Max 4/day    And    benztropine (COGENTIN) injection 1 mg  1 mg Intramuscular Q4H PRN Max 4/day    benztropine (COGENTIN) tablet 1 mg  1 mg Oral Q6H PRN    benztropine (COGENTIN) tablet 1 mg  1 mg Oral HS    chlorproMAZINE (THORAZINE) injection SOLN 50 mg  50 mg Intramuscular Q6H PRN    chlorproMAZINE (THORAZINE) tablet 75 mg  75 mg Oral Q6H PRN    [START ON 7/26/2022] cholecalciferol (VITAMIN D3) tablet 1,000 Units  1,000 Units Oral Daily    clonazePAM (KlonoPIN) tablet 0 5 mg  0 5 mg Oral BID    cyanocobalamin (VITAMIN B-12) tablet 1,000 mcg  1,000 mcg Oral Daily    divalproex sodium (DEPAKOTE) EC tablet 1,000 mg  1,000 mg Oral HS    ergocalciferol (VITAMIN D2) capsule 50,000 Units  50,000 Units Oral Weekly    haloperidol (HALDOL) tablet 15 mg  15 mg Oral HS    hydrOXYzine HCL (ATARAX) tablet 25 mg  25 mg Oral Q6H PRN Max 4/day    hydrOXYzine HCL (ATARAX) tablet 50 mg  50 mg Oral Q4H PRN Max 4/day    Or    LORazepam (ATIVAN) injection 1 mg  1 mg Intramuscular Q4H PRN    lithium carbonate capsule 300 mg  300 mg Oral HS    LORazepam (ATIVAN) tablet 1 mg  1 mg Oral Q6H PRN    Or    LORazepam (ATIVAN) injection 2 mg  2 mg Intramuscular Q6H PRN Max 3/day    metoprolol tartrate (LOPRESSOR) tablet 25 mg  25 mg Oral Q12H [address]    nicotine (NICODERM CQ) 7 mg/24hr TD 24 hr patch 1 patch  1 patch Transdermal Daily    nicotine polacrilex (NICORETTE) gum 2 mg  2 mg Oral Q2H PRN    OLANZapine (ZyPREXA ZYDIS) dispersible tablet 10 mg  10 mg Oral Q3H PRN    OLANZapine (ZyPREXA ZYDIS) dispersible tablet 5 mg  5 mg Oral Q3H PRN    OLANZapine (ZyPREXA) IM injection 10 mg  10 mg Intramuscular Q2H PRN    polyethylene glycol (MIRALAX) packet 17 g  17 g Oral Daily PRN    QUEtiapine (SEROquel) tablet 200 mg  200 mg Oral HS    traZODone (DESYREL) tablet 150 mg  150 mg Oral HS PRN     Labs: I have personally reviewed all pertinent laboratory/tests results     CMP:   Lab Results   Component Value Date    SODIUM 140 04/23/2022    K 3 6 04/23/2022     04/23/2022    CO2 30 04/23/2022    AGAP 8 04/23/2022    BUN 9 04/23/2022    CREATININE 1 04 04/23/2022    GLUC 83 04/23/2022    GLUF 83 04/23/2022    CALCIUM 9 9 04/23/2022    AST 39 04/23/2022    ALT 30 04/23/2022    ALKPHOS 58 04/23/2022    TP 7 2 04/23/2022    ALB 4 7 04/23/2022    TBILI 0 88 04/23/2022    EGFR 97 04/23/2022     Depakote:   Lab Results Component Value Date    VALPROICTOT 93 05/15/2022     Lithium:   Lab Results   Component Value Date    LITHIUM 0 7 05/03/2022     Counseling / Coordination of Care  Total floor / unit time spent today 25 minutes  Greater than 50% of total time was spent with the patient and / or family counseling and / or coordination of care

## 2022-05-16 NOTE — PROGRESS NOTES
05/16/22 0918   Team Meeting   Meeting Type Daily Rounds   Team Members Present   Team Members Present Physician;Nurse;; Other (Discipline and Name)   Physician Team Member Manuel Campos [facility] Team Member [address] [facility] Work Team Member [facility]   Other (Discipline and Name) [facility]   Patient/Family Present   Patient Present No   Patient's Family Present No      05/16/22 0918   Team Meeting   Meeting Type Daily Rounds   Team Members Present   Team Members Present Physician;Nurse;; Other (Discipline and Name)   Physician Team Member Mateo Campos   Nursing Team Member [address] [facility],[address] Work Team Member [facility]   Other (Discipline and Name) [facility]   Patient/Family Present   Patient Present No   Patient's Family Present No     Patient is restless, lethargic, sedated, medication changes

## 2022-05-16 NOTE — PLAN OF CARE
Problem: Alteration in Thoughts and Perception  Goal: Verbalize thoughts and feelings  Description: Interventions:  - Promote a nonjudgmental and trusting relationship with the patient through active listening and therapeutic communication  - Assess patient's level of functioning, behavior and potential for risk  - Engage patient in 1 on 1 interactions  - Encourage patient to express fears, feelings, frustrations, and discuss symptoms    - Banquete patient to reality, help patient recognize reality-based thinking   - Administer medications as ordered and assess for potential side effects  - Provide the patient education related to the signs and symptoms of the illness and desired effects of prescribed medications  Outcome: Progressing  Goal: Refrain from acting on delusional thinking/internal stimuli  Description: Interventions:  - Monitor patient closely, per order   - Utilize least restrictive measures   - Set reasonable limits, give positive feedback for acceptable   - Administer medications as ordered and monitor of potential side effects  Outcome: Progressing  Goal: Agree to be compliant with medication regime, as prescribed and report medication side effects  Description: Interventions:  - Offer appropriate PRN medication and supervise ingestion; conduct AIMS, as needed   Outcome: Progressing  Goal: Attend and participate in unit activities, including therapeutic, recreational, and educational groups  Description: Interventions:  -Encourage Visitation and family involvement in care  Outcome: Progressing  Goal: Recognize dysfunctional thoughts, communicate reality-based thoughts at the time of discharge  Description: Interventions:  - Provide medication and psycho-education to assist patient in compliance and developing insight into his/her illness   Outcome: Progressing  Goal: Complete daily ADLs, including personal hygiene independently, as able  Description: Interventions:  - Observe, teach, and assist patient with ADLS  - Monitor and promote a balance of rest/activity, with adequate nutrition and elimination   Outcome: Progressing     Problem: Depression  Goal: Verbalize thoughts and feelings  Description: Interventions:  - Assess and re-assess patient's level of risk   - Engage patient in 1:1 interactions, daily, for a minimum of 15 minutes   - Encourage patient to express feelings, fears, frustrations, hopes   Outcome: Progressing  Goal: Refrain from isolation  Description: Interventions:  - Develop a trusting relationship   - Encourage socialization   Outcome: Progressing  Goal: Attend and participate in unit activities, including therapeutic, recreational, and educational groups  Description: Interventions:  - Provide therapeutic and educational activities daily, encourage attendance and participation, and document same in the medical record   Outcome: Progressing  Goal: Complete daily ADLs, including personal hygiene independently, as able  Description: Interventions:  - Observe, teach, and assist patient with ADLS  -  Monitor and promote a balance of rest/activity, with adequate nutrition and elimination   Outcome: Progressing

## 2022-05-16 NOTE — NURSING NOTE
Pt visible on the unit walking the halls with an irritable edge approaching various peers even while some were engaged in personal phone calls  Murchison Laurita was redirected frequently without difficulty  Patient denies SI/HI ,Observed in conversation by himself  Lawrence was observed shortly before 2030 in bed asleep, opt has been severely lacking sleep,he was prompted for night medications however remained asleep and is currently asleep at the time of this note  Staff will continue to monitor per rounds and as needed

## 2022-05-17 PROCEDURE — 99232 SBSQ HOSP IP/OBS MODERATE 35: CPT | Performed by: NURSE PRACTITIONER

## 2022-05-17 RX ORDER — LITHIUM CARBONATE 150 MG/1
150 CAPSULE ORAL
Status: DISCONTINUED | OUTPATIENT
Start: 2022-05-17 | End: 2022-05-18

## 2022-05-17 RX ADMIN — CLONAZEPAM 0.5 MG: 0.5 TABLET ORAL at 08:23

## 2022-05-17 RX ADMIN — ALUMINUM HYDROXIDE, MAGNESIUM HYDROXIDE, AND SIMETHICONE 30 ML: 200; 200; 20 SUSPENSION ORAL at 13:50

## 2022-05-17 RX ADMIN — QUETIAPINE FUMARATE 200 MG: 200 TABLET ORAL at 20:27

## 2022-05-17 RX ADMIN — METOPROLOL TARTRATE 25 MG: 25 TABLET ORAL at 20:26

## 2022-05-17 RX ADMIN — DIVALPROEX SODIUM 1000 MG: 500 TABLET, DELAYED RELEASE ORAL at 20:27

## 2022-05-17 RX ADMIN — CYANOCOBALAMIN TAB 500 MCG 1000 MCG: 500 TAB at 08:23

## 2022-05-17 RX ADMIN — BENZTROPINE MESYLATE 1 MG: 1 TABLET ORAL at 20:26

## 2022-05-17 RX ADMIN — LORAZEPAM 1 MG: 1 TABLET ORAL at 20:30

## 2022-05-17 RX ADMIN — NICOTINE POLACRILEX 2 MG: 2 GUM, CHEWING BUCCAL at 14:33

## 2022-05-17 RX ADMIN — METOPROLOL TARTRATE 25 MG: 25 TABLET ORAL at 08:23

## 2022-05-17 RX ADMIN — NICOTINE POLACRILEX 2 MG: 2 GUM, CHEWING BUCCAL at 17:42

## 2022-05-17 RX ADMIN — CLONAZEPAM 0.5 MG: 0.5 TABLET ORAL at 17:40

## 2022-05-17 RX ADMIN — HALOPERIDOL 15 MG: 5 TABLET ORAL at 20:26

## 2022-05-17 RX ADMIN — LITHIUM CARBONATE 150 MG: 150 CAPSULE, GELATIN COATED ORAL at 20:26

## 2022-05-17 RX ADMIN — ARIPIPRAZOLE 5 MG: 5 TABLET ORAL at 08:23

## 2022-05-17 NOTE — PROGRESS NOTES
05/17/22 0855   Team Meeting   Meeting Type Daily Rounds   Team Members Present   Team Members Present Physician;Nurse;; Other (Discipline and Name)   Physician Team Member Flores Terry   Nursing Team Member [facility]   Social Work Team Member [facility]   Other (Discipline and Name) [facility]   Patient/Family Present   Patient Present No   Patient's Family Present No     Patient slept, he is less lethargic and confused  He is able to provide information regarding his family

## 2022-05-17 NOTE — PROGRESS NOTES
Progress Note - Andra Ayala 31 y o  male MRN: [id number]    Unit/Bed#: [facility] 251-01 Encounter: [id number]        Subjective:   Patient seen and examined at bedside after reviewing the chart and discussing the case with the caring staff  Patient examined at bedside  Patient has no acute complaints  Physical Exam   Vitals: Blood pressure 118/79, pulse (!) 125, temperature 99 1 °F (37 3 °C), temperature source Temporal, resp  rate 16, height 5' 6" (1 676 m), weight 78 3 kg (172 lb 9 6 oz), SpO2 95 %  ,Body mass index is 27 86 kg/m²  Constitutional:  Patient appears in no acute distress  HEENT: PERR, EOMI, MMM  Cardiovascular:  Regular rate, regular rhythm  Pulmonary/Chest: Effort normal and breath sounds normal    Abdomen: Soft, + BS, NT  Assessment/Plan:  Andra Ayala is a(n) 31y o  year old male with schizoaffective disorder bipolar type      1  Tobacco abuse  Patient has been put on nicotine transdermal patch 7 mg daily  2  Arthritis/headache  Patient may get Tylenol on as needed basis  3  GERD  Patient may take Mylanta as needed  4  Insomnia  Patient may get trazodone as needed  5  Vitamin-D deficiency  Patient started on vitamin D2 73376 units weekly for 14 weeks followed by vitamin D3 1000 units daily  6  Vitamin B12 deficiency  Patient started on vitamin B12 supplement  7  Tachycardia  Patient started on metoprolol tartrate 25 mg twice daily on 04/23/2022  Continue to monitor closely

## 2022-05-17 NOTE — NURSING NOTE
Sought out by patient this am in hallway  Presents clean  Offering good eye contact, brighter than recently seen affect  Jessica Stanford to share he, "broke his mental cage"  Questioned the year, state we are in, etc  but was quickly reoriented and states, "I am realizing everything now"  Discussed recently improved sleep with patient and medication changes  Pt receptive  Asked when he will be able to leave and discussed need to contact family  Patient provided telephone number for his Odalys Torres (sp?) [phone] who speaks English and should be able to get treatment team in touch with his mother  Positive reinforcement offered to patient and support provided  Agreeable to continue making needs known  Will share above with treatment team in am rounds

## 2022-05-17 NOTE — NURSING NOTE
Patient observed sleeping throughout  the night per rounds  Patiient awakened briefly for a beverage and returned to sleep  Patient is stable withoutt signs of obvious distress noted

## 2022-05-17 NOTE — PROGRESS NOTES
Progress Note - Behavioral Health   Sanket Hammonds 31 y o  male MRN: [id number]  Unit/Bed#: [facility] 251-01 Encounter: [id number]    Assessment/Plan   Principal Problem:    Schizoaffective disorder, bipolar type ([address] )      Behavior over the last 24 hours:  improved  Sleep:  Improved  Appetite: normal  Medication side effects: No  ROS: no complaints and all other systems are negative    Alvina Best was seen this morning for psychiatric follow-up  Patient significantly less sedated and he reported, I feel more normal, more like myself    His thoughts were linear and goal-directed  Patient was able to recognize he is in the hospital and provided phone numbers for staff to contact family  Patient denied any anxiety or depression and further stated, I'm just trying to figure out how I was  I know I'm doing better now  I feel better    Affect was appropriate  Alvina Best denied AVH/SI/HI  His sleep has significantly improved since admission  Remains compliant with medications and meals and has been visible in the milieu  Mental Status Evaluation:  Appearance:  age appropriate and Wearing paper scrubs, improved eye contact   Behavior:  Cooperative, calm   Speech:  soft   Mood:  "normal"   Affect:  mood-congruent and Appropriate   Thought Process:  More organized, linear, goal-directed   Thought Content:  No overt delusions or paranoia   Perceptual Disturbances: Denied AVH, did not appear internally preoccupied   Risk Potential: Suicidal Ideations none  Homicidal Ideations none  Potential for Aggression No   Sensorium:  person and place   Memory:  recent and remote memory grossly intact   Consciousness:  alert and awake    Attention: attention span and concentration were age appropriate   Insight:  Improving   Judgment: Improving   Gait/Station: normal balance and slow   Motor Activity: no abnormal movements     Progress Toward Goals:  Improving  Patient less disorganized, less sedated, reports feeling, normal, more like myself   Will continue with lithium taper and decreased to 150 mg PO QHS  Plan is to further taper Seroquel to discontinuation and continue with Haldol and Abilify for psychosis, disorganization, and mood stabilization  At this time, patient continues to require multiple antipsychotics due to severity of disorganization and psychosis  Will return home upon stabilization  No discharge date at this time  Recommended Treatment: Continue with group therapy, milieu therapy and occupational therapy  Risks, benefits and possible side effects of Medications:   Martha Guillen has limited understanding of risks versus benefits of medications, but agrees to take as prescribed      Medications:   Decrease lithium 150 mg PO QHS  all current active meds have been reviewed and current meds:   Current Facility-Administered Medications   Medication Dose Route Frequency    acetaminophen (TYLENOL) tablet 650 mg  650 mg Oral Q6H PRN    acetaminophen (TYLENOL) tablet 650 mg  650 mg Oral Q4H PRN    acetaminophen (TYLENOL) tablet 975 mg  975 mg Oral Q6H PRN    aluminum-magnesium hydroxide-simethicone (MYLANTA) oral suspension 30 mL  30 mL Oral Q4H PRN    ARIPiprazole (ABILIFY) tablet 5 mg  5 mg Oral Daily    haloperidol lactate (HALDOL) injection 2 5 mg  2 5 mg Intramuscular Q6H PRN Max 4/day    And    LORazepam (ATIVAN) injection 1 mg  1 mg Intramuscular Q6H PRN Max 4/day    And    benztropine (COGENTIN) injection 0 5 mg  0 5 mg Intramuscular Q6H PRN Max 4/day    LORazepam (ATIVAN) injection 2 mg  2 mg Intramuscular Q4H PRN Max 4/day    And    benztropine (COGENTIN) injection 1 mg  1 mg Intramuscular Q4H PRN Max 4/day    benztropine (COGENTIN) tablet 1 mg  1 mg Oral Q6H PRN    benztropine (COGENTIN) tablet 1 mg  1 mg Oral HS    chlorproMAZINE (THORAZINE) injection SOLN 50 mg  50 mg Intramuscular Q6H PRN    chlorproMAZINE (THORAZINE) tablet 75 mg  75 mg Oral Q6H PRN    [START ON 7/26/2022] cholecalciferol (VITAMIN D3) tablet 1,000 Units  1,000 Units Oral Daily    clonazePAM (KlonoPIN) tablet 0 5 mg  0 5 mg Oral BID    cyanocobalamin (VITAMIN B-12) tablet 1,000 mcg  1,000 mcg Oral Daily    divalproex sodium (DEPAKOTE) EC tablet 1,000 mg  1,000 mg Oral HS    ergocalciferol (VITAMIN D2) capsule 50,000 Units  50,000 Units Oral Weekly    haloperidol (HALDOL) tablet 15 mg  15 mg Oral HS    hydrOXYzine HCL (ATARAX) tablet 25 mg  25 mg Oral Q6H PRN Max 4/day    hydrOXYzine HCL (ATARAX) tablet 50 mg  50 mg Oral Q4H PRN Max 4/day    Or    LORazepam (ATIVAN) injection 1 mg  1 mg Intramuscular Q4H PRN    lithium carbonate capsule 150 mg  150 mg Oral HS    LORazepam (ATIVAN) tablet 1 mg  1 mg Oral Q6H PRN    Or    LORazepam (ATIVAN) injection 2 mg  2 mg Intramuscular Q6H PRN Max 3/day    metoprolol tartrate (LOPRESSOR) tablet 25 mg  25 mg Oral Q12H [facility]    nicotine (NICODERM CQ) 7 mg/24hr TD 24 hr patch 1 patch  1 patch Transdermal Daily    nicotine polacrilex (NICORETTE) gum 2 mg  2 mg Oral Q2H PRN    OLANZapine (ZyPREXA ZYDIS) dispersible tablet 10 mg  10 mg Oral Q3H PRN    OLANZapine (ZyPREXA ZYDIS) dispersible tablet 5 mg  5 mg Oral Q3H PRN    OLANZapine (ZyPREXA) IM injection 10 mg  10 mg Intramuscular Q2H PRN    polyethylene glycol (MIRALAX) packet 17 g  17 g Oral Daily PRN    QUEtiapine (SEROquel) tablet 200 mg  200 mg Oral HS    traZODone (DESYREL) tablet 150 mg  150 mg Oral HS PRN     Labs: I have personally reviewed all pertinent laboratory/tests results  Counseling / Coordination of Care  Total floor / unit time spent today 25 minutes  Greater than 50% of total time was spent with the patient and / or family counseling and / or coordination of care   A description of the counseling / coordination of care:  Medication education, treatment plan, supportive therapy

## 2022-05-17 NOTE — NURSING NOTE
Patient was pleasant and cooperative  Patient was clearer and less sedated  Patient told RN he feels better  He apologized for his behaviors over the last couple of weeks  Patient compliant with medications  Staff support provided  Q 7 minute safety checks maintained  Patient denies SI/HI  Staff will continue to monitor and support

## 2022-05-17 NOTE — PLAN OF CARE
Problem: Alteration in Thoughts and Perception  Goal: Treatment Goal: Gain control of psychotic behaviors/thinking, reduce/eliminate presenting symptoms and demonstrate improved reality functioning upon discharge  Outcome: Progressing  Goal: Verbalize thoughts and feelings  Description: Interventions:  - Promote a nonjudgmental and trusting relationship with the patient through active listening and therapeutic communication  - Assess patient's level of functioning, behavior and potential for risk  - Engage patient in 1 on 1 interactions  - Encourage patient to express fears, feelings, frustrations, and discuss symptoms    - [place] patient to reality, help patient recognize reality-based thinking   - Administer medications as ordered and assess for potential side effects  - Provide the patient education related to the signs and symptoms of the illness and desired effects of prescribed medications  Outcome: Progressing  Goal: Refrain from acting on delusional thinking/internal stimuli  Description: Interventions:  - Monitor patient closely, per order   - Utilize least restrictive measures   - Set reasonable limits, give positive feedback for acceptable   - Administer medications as ordered and monitor of potential side effects  Outcome: Progressing  Goal: Agree to be compliant with medication regime, as prescribed and report medication side effects  Description: Interventions:  - Offer appropriate PRN medication and supervise ingestion; conduct AIMS, as needed   Outcome: Progressing  Goal: Attend and participate in unit activities, including therapeutic, recreational, and educational groups  Description: Interventions:  -Encourage Visitation and family involvement in care  Outcome: Progressing  Goal: Recognize dysfunctional thoughts, communicate reality-based thoughts at the time of discharge  Description: Interventions:  - Provide medication and psycho-education to assist patient in compliance and developing insight into his/her illness   Outcome: Progressing  Goal: Complete daily ADLs, including personal hygiene independently, as able  Description: Interventions:  - Observe, teach, and assist patient with ADLS  - Monitor and promote a balance of rest/activity, with adequate nutrition and elimination   Outcome: Progressing     Problem: Depression  Goal: Treatment Goal: Demonstrate behavioral control of depressive symptoms, verbalize feelings of improved mood/affect, and adopt new coping skills prior to discharge  Outcome: Progressing  Goal: Verbalize thoughts and feelings  Description: Interventions:  - Assess and re-assess patient's level of risk   - Engage patient in 1:1 interactions, daily, for a minimum of 15 minutes   - Encourage patient to express feelings, fears, frustrations, hopes   Outcome: Progressing  Goal: Refrain from isolation  Description: Interventions:  - Develop a trusting relationship   - Encourage socialization   Outcome: Progressing  Goal: Refrain from self-neglect  Outcome: Progressing  Goal: Attend and participate in unit activities, including therapeutic, recreational, and educational groups  Description: Interventions:  - Provide therapeutic and educational activities daily, encourage attendance and participation, and document same in the medical record   Outcome: Progressing  Goal: Complete daily ADLs, including personal hygiene independently, as able  Description: Interventions:  - Observe, teach, and assist patient with ADLS  -  Monitor and promote a balance of rest/activity, with adequate nutrition and elimination   Outcome: Progressing     Problem: Risk for Violence/Aggression Toward Others  Goal: Treatment Goal: Refrain from acts of violence/aggression during length of stay, and demonstrate improved impulse control at the time of discharge  Outcome: Progressing  Goal: Verbalize thoughts and feelings  Description: Interventions:  - Assess and re-assess patient's level of risk, every waking shift  - Engage patient in 1:1 interactions, daily, for a minimum of 15 minutes   - Allow patient to express feelings and frustrations in a safe and non-threatening manner   - Establish rapport/trust with patient   Outcome: Progressing  Goal: Refrain from harming others  Outcome: Progressing  Goal: Refrain from destructive acts on the environment or property  Outcome: Progressing  Goal: Control angry outbursts  Description: Interventions:  - Monitor patient closely, per order  - Ensure early verbal de-escalation  - Monitor prn medication needs  - Set reasonable/therapeutic limits, outline behavioral expectations, and consequences   - Provide a non-threatening milieu, utilizing the least restrictive interventions   Outcome: Progressing  Goal: Attend and participate in unit activities, including therapeutic, recreational, and educational groups  Description: Interventions:  - Provide therapeutic and educational activities daily, encourage attendance and participation, and document same in the medical record   Outcome: Progressing  Goal: Identify appropriate positive anger management techniques  Description: Interventions:  - Offer anger management and coping skills groups   - Staff will provide positive feedback for appropriate anger control  Outcome: Progressing     Problem: Ineffective Coping  Goal: Participates in unit activities  Description: Interventions:  - Provide therapeutic environment   - Provide required programming   - Redirect inappropriate behaviors   Outcome: Progressing     Problem: DISCHARGE PLANNING  Goal: Discharge to home or other facility with appropriate resources  Description: INTERVENTIONS:  - Identify barriers to discharge w/patient and caregiver  - Arrange for needed discharge resources and transportation as appropriate  - Identify discharge learning needs (meds, wound care, etc )  - Refer to Case Management Department for coordinating discharge planning if the patient needs post-hospital services based on physician/advanced practitioner order or complex needs related to functional status, cognitive ability, or social support system  Outcome: Progressing     Problem: Nutrition/Hydration-ADULT  Goal: Nutrient/Hydration intake appropriate for improving, restoring or maintaining nutritional needs  Description: Monitor and assess patient's nutrition/hydration status for malnutrition  Collaborate with interdisciplinary team and initiate plan and interventions as ordered  Monitor patient's weight and dietary intake as ordered or per policy  Utilize nutrition screening tool and intervene as necessary  Determine patient's food preferences and provide high-protein, high-caloric foods as appropriate       INTERVENTIONS:  - Monitor oral intake, urinary output, labs, and treatment plans  - Assess nutrition and hydration status and recommend course of action  - Evaluate amount of meals eaten  - Assist patient with eating if necessary   - Allow adequate time for meals  - Recommend/ encourage appropriate diets, oral nutritional supplements, and vitamin/mineral supplements  - Order, calculate, and assess calorie counts as needed  - Recommend, monitor, and adjust tube feedings and TPN/PPN based on assessed needs  - Assess need for intravenous fluids  - Provide specific nutrition/hydration education as appropriate  - Include patient/family/caregiver in decisions related to nutrition  Outcome: Progressing

## 2022-05-18 PROCEDURE — 99232 SBSQ HOSP IP/OBS MODERATE 35: CPT | Performed by: NURSE PRACTITIONER

## 2022-05-18 RX ORDER — ARIPIPRAZOLE 10 MG/1
10 TABLET ORAL DAILY
Status: DISCONTINUED | OUTPATIENT
Start: 2022-05-19 | End: 2022-05-25 | Stop reason: HOSPADM

## 2022-05-18 RX ADMIN — METOPROLOL TARTRATE 25 MG: 25 TABLET ORAL at 21:09

## 2022-05-18 RX ADMIN — CYANOCOBALAMIN TAB 500 MCG 1000 MCG: 500 TAB at 10:03

## 2022-05-18 RX ADMIN — HALOPERIDOL 15 MG: 5 TABLET ORAL at 21:10

## 2022-05-18 RX ADMIN — CLONAZEPAM 0.5 MG: 0.5 TABLET ORAL at 19:05

## 2022-05-18 RX ADMIN — NICOTINE POLACRILEX 2 MG: 2 GUM, CHEWING BUCCAL at 10:14

## 2022-05-18 RX ADMIN — BENZTROPINE MESYLATE 1 MG: 1 TABLET ORAL at 21:10

## 2022-05-18 RX ADMIN — CLONAZEPAM 0.5 MG: 0.5 TABLET ORAL at 09:03

## 2022-05-18 RX ADMIN — DIVALPROEX SODIUM 1000 MG: 500 TABLET, DELAYED RELEASE ORAL at 21:09

## 2022-05-18 RX ADMIN — ARIPIPRAZOLE 5 MG: 5 TABLET ORAL at 09:03

## 2022-05-18 RX ADMIN — QUETIAPINE FUMARATE 200 MG: 200 TABLET ORAL at 21:09

## 2022-05-18 NOTE — PROGRESS NOTES
Progress Note - Jad Alvarez 31 y o  male MRN: [id number]    Unit/Bed#: [facility] 251-01 Encounter: [id number]        Subjective:   Patient seen and examined at bedside after reviewing the chart and discussing the case with the caring staff  Patient examined at bedside  Patient has no acute complaints  Physical Exam   Vitals: Blood pressure 98/63, pulse 84, temperature 97 5 °F (36 4 °C), temperature source Temporal, resp  rate 16, height 5' 6" (1 676 m), weight 78 3 kg (172 lb 9 6 oz), SpO2 99 %  ,Body mass index is 27 86 kg/m²  Constitutional:  Patient appears in no acute distress  HEENT: PERR, EOMI, MMM  Cardiovascular:  Regular rate, regular rhythm  Pulmonary/Chest: Effort normal and breath sounds normal    Abdomen: Soft, + BS, NT  Assessment/Plan:  Jad Alvarez is a(n) 31y o  year old male with schizoaffective disorder bipolar type      1  Tobacco abuse  Patient has been put on nicotine transdermal patch 7 mg daily  2  Arthritis/headache  Patient may get Tylenol on as needed basis  3  GERD  Patient may take Mylanta as needed  4  Insomnia  Patient may get trazodone as needed  5  Vitamin-D deficiency  Patient started on vitamin D2 49757 units weekly for 14 weeks followed by vitamin D3 1000 units daily  6  Vitamin B12 deficiency  Patient started on vitamin B12 supplement  7  Tachycardia  Patient started on metoprolol tartrate 25 mg twice daily on 04/23/2022  Continue to monitor closely

## 2022-05-18 NOTE — NURSING NOTE
Patient observed sleeping during most Q 7 minute safety checks  Awake from 0415 - 0430 (out for drink)  Safety maintained via clutter-free environment, ID band, and given non-skid socks  No suicidal ideations, acting out or homicidal behaviors  No change in medical condition or complaints voiced  Fluids maintained at bedside to promote hydration

## 2022-05-18 NOTE — NURSING NOTE
Patient was compliant with medication regime  Denies current homicidal ideations  Had a period of yelling and cursing  Moderate psychosis noted  Agreed to take Ativan 1 mg PO at 2030  Brasher Scale 25  Calmed after 30 minutes  Occasional yelling out, redirection improved  Safety maintained via clutter-free environment, ID band, and given non-skid socks  Fluids maintained at beside to promote hydrations  Maintained on q 7 minute safety checks  Will continue to monitor

## 2022-05-18 NOTE — PROGRESS NOTES
Progress Note - Behavioral Health   Roz Robison 31 y o  male MRN: [id number]  Unit/Bed#: -01 Encounter: [id number]    Assessment/Plan   Principal Problem:    Schizoaffective disorder, bipolar type ([address] )      Behavior over the last 24 hours:  unchanged  Sleep: normal  Appetite:  Fair  Medication side effects: No  ROS: no complaints and all other systems are negative    Eli Villaseñor was seen today for psychiatric follow-up  He was calm and cooperative  Less sedated and reports, I feel more normal  Reports frustration with peers and at times endorses increased anxiety and agitation due to unit stimuli  Patient was able to be redirected and he is understanding that medications are continuing to be adjusted; he was appreciative and accepting of this  He verbalized want to return to his grandmother's once I'm better    Aware that CM is working on contacting family; patient stated, Tommy Barton can call my Gram   She is always home, but I don't know her number    Patient then became agitated when hearing another peer in the hallway and began talking about squid games  "If this were squid games we would all be dead bro! Come on!" Patient became increasingly agitated, but did respond to verbal redirection  Denied AVH/SI/HI  Remains compliant with medications and meals      Mental Status Evaluation:  Appearance:  age appropriate, casually dressed and Resting in bed   Behavior:  Cooperative, calm, redirectable   Speech:  soft and Loud at times   Mood:  "more normal," mildy anxious   Affect:  mood-congruent and redirectable   Thought Process:  circumstantial and illogical at times   Thought Content:  No overt delusions or paranoia   Perceptual Disturbances: Denied AVH, did not appear internally preoccupied   Risk Potential: Suicidal Ideations none  Homicidal Ideations none  Potential for Aggression No   Sensorium:  person and place   Memory:  recent memory mildly impaired   Consciousness:  alert and awake    Attention: attention span appeared shorter than expected for age   Insight:  limited   Judgment: limited   Gait/Station: Laying in bed   Motor Activity: no abnormal movements     Progress Toward Goals:  Unchanged  Less sedated  Thoughts are circumstantial and illogical at times  Reports feeling "more normal " Intermittent agitation toward end of interview  Will increase Abilify 10 mg PO QD for psychosis and intermittent agitation and discontinue lithium  Continue with remainder psychotropic regimen as ordered  Plan is to further taper Seroquel to discontinuation to avoid over sedation and polypharmacy  No discharge date at this time  Recommended Treatment: Continue with group therapy, milieu therapy and occupational therapy  Risks, benefits and possible side effects of Medications:   Chanel Elmore has limited understanding of risks versus benefits of medications, but agrees to take as prescribed      Medications:  Increase Abilify 10 mg PO QD  Discontinue Lithium   all current active meds have been reviewed and current meds:   Current Facility-Administered Medications   Medication Dose Route Frequency    acetaminophen (TYLENOL) tablet 650 mg  650 mg Oral Q6H PRN    acetaminophen (TYLENOL) tablet 650 mg  650 mg Oral Q4H PRN    acetaminophen (TYLENOL) tablet 975 mg  975 mg Oral Q6H PRN    aluminum-magnesium hydroxide-simethicone (MYLANTA) oral suspension 30 mL  30 mL Oral Q4H PRN    [START ON 5/19/2022] ARIPiprazole (ABILIFY) tablet 10 mg  10 mg Oral Daily    haloperidol lactate (HALDOL) injection 2 5 mg  2 5 mg Intramuscular Q6H PRN Max 4/day    And    LORazepam (ATIVAN) injection 1 mg  1 mg Intramuscular Q6H PRN Max 4/day    And    benztropine (COGENTIN) injection 0 5 mg  0 5 mg Intramuscular Q6H PRN Max 4/day    LORazepam (ATIVAN) injection 2 mg  2 mg Intramuscular Q4H PRN Max 4/day    And    benztropine (COGENTIN) injection 1 mg  1 mg Intramuscular Q4H PRN Max 4/day    benztropine (COGENTIN) tablet 1 mg  1 mg Oral Q6H PRN    benztropine (COGENTIN) tablet 1 mg  1 mg Oral HS    chlorproMAZINE (THORAZINE) injection SOLN 50 mg  50 mg Intramuscular Q6H PRN    chlorproMAZINE (THORAZINE) tablet 75 mg  75 mg Oral Q6H PRN    [START ON 7/26/2022] cholecalciferol (VITAMIN D3) tablet 1,000 Units  1,000 Units Oral Daily    clonazePAM (KlonoPIN) tablet 0 5 mg  0 5 mg Oral BID    cyanocobalamin (VITAMIN B-12) tablet 1,000 mcg  1,000 mcg Oral Daily    divalproex sodium (DEPAKOTE) EC tablet 1,000 mg  1,000 mg Oral HS    ergocalciferol (VITAMIN D2) capsule 50,000 Units  50,000 Units Oral Weekly    haloperidol (HALDOL) tablet 15 mg  15 mg Oral HS    hydrOXYzine HCL (ATARAX) tablet 25 mg  25 mg Oral Q6H PRN Max 4/day    hydrOXYzine HCL (ATARAX) tablet 50 mg  50 mg Oral Q4H PRN Max 4/day    Or    LORazepam (ATIVAN) injection 1 mg  1 mg Intramuscular Q4H PRN    LORazepam (ATIVAN) tablet 1 mg  1 mg Oral Q6H PRN    Or    LORazepam (ATIVAN) injection 2 mg  2 mg Intramuscular Q6H PRN Max 3/day    metoprolol tartrate (LOPRESSOR) tablet 25 mg  25 mg Oral Q12H [address]    nicotine (NICODERM CQ) 7 mg/24hr TD 24 hr patch 1 patch  1 patch Transdermal Daily    nicotine polacrilex (NICORETTE) gum 2 mg  2 mg Oral Q2H PRN    OLANZapine (ZyPREXA ZYDIS) dispersible tablet 10 mg  10 mg Oral Q3H PRN    OLANZapine (ZyPREXA ZYDIS) dispersible tablet 5 mg  5 mg Oral Q3H PRN    OLANZapine (ZyPREXA) IM injection 10 mg  10 mg Intramuscular Q2H PRN    polyethylene glycol (MIRALAX) packet 17 g  17 g Oral Daily PRN    QUEtiapine (SEROquel) tablet 200 mg  200 mg Oral HS    traZODone (DESYREL) tablet 150 mg  150 mg Oral HS PRN     Labs: I have personally reviewed all pertinent laboratory/tests results  Counseling / Coordination of Care  Total floor / unit time spent today 25 minutes  Greater than 50% of total time was spent with the patient and / or family counseling and / or coordination of care

## 2022-05-19 PROCEDURE — 99232 SBSQ HOSP IP/OBS MODERATE 35: CPT | Performed by: PSYCHIATRY & NEUROLOGY

## 2022-05-19 RX ORDER — HALOPERIDOL 10 MG/1
10 TABLET ORAL
Status: DISCONTINUED | OUTPATIENT
Start: 2022-05-20 | End: 2022-05-23

## 2022-05-19 RX ADMIN — CHLORPROMAZINE HYDROCHLORIDE 75 MG: 25 TABLET, SUGAR COATED ORAL at 14:50

## 2022-05-19 RX ADMIN — BENZTROPINE MESYLATE 1 MG: 1 TABLET ORAL at 20:55

## 2022-05-19 RX ADMIN — HALOPERIDOL 15 MG: 5 TABLET ORAL at 20:55

## 2022-05-19 RX ADMIN — METOPROLOL TARTRATE 25 MG: 25 TABLET ORAL at 20:39

## 2022-05-19 RX ADMIN — ARIPIPRAZOLE 400 MG: KIT at 15:47

## 2022-05-19 RX ADMIN — ARIPIPRAZOLE 10 MG: 10 TABLET ORAL at 08:36

## 2022-05-19 RX ADMIN — CLONAZEPAM 0.5 MG: 0.5 TABLET ORAL at 08:36

## 2022-05-19 RX ADMIN — CLONAZEPAM 0.5 MG: 0.5 TABLET ORAL at 19:47

## 2022-05-19 RX ADMIN — NICOTINE 7 MG/24 HR DAILY TRANSDERMAL PATCH 1 PATCH: at 08:36

## 2022-05-19 RX ADMIN — CYANOCOBALAMIN TAB 500 MCG 1000 MCG: 500 TAB at 08:36

## 2022-05-19 RX ADMIN — DIVALPROEX SODIUM 1000 MG: 500 TABLET, DELAYED RELEASE ORAL at 20:55

## 2022-05-19 RX ADMIN — QUETIAPINE FUMARATE 200 MG: 200 TABLET ORAL at 20:55

## 2022-05-19 RX ADMIN — NICOTINE POLACRILEX 2 MG: 2 GUM, CHEWING BUCCAL at 08:36

## 2022-05-19 RX ADMIN — LORAZEPAM 1 MG: 1 TABLET ORAL at 14:50

## 2022-05-19 NOTE — NUTRITION
05/19/22 1344   Biochemical Data,Medical Tests, and Procedures   Biochemical Data/Medical Tests/Procedures Meds reviewed;Lab values reviewed   Labs (Comment) No new labs   Meds (Comment) mylanta, abilify, cogentin, thorazine, Vit D3, klonopin, Vit B12, depakote, Vit D2, haldol, lopressor, zyprexa, seroquel   Speech Therapy Recommendations (Comment) Patient reports no dysphagia   Nutrition-Focused Physical Exam   Nutrition-Focused Physical Exam Findings RN skin assessment reviewed; No skin issues documented   Medical-Related Concerns PMH reviewed   Adequacy of Intake   Nutrition Modality PO   Feeding Route   PO Independent   Current PO Intake   Current Diet Order Regular diet thin liquids   Current Meal Intake 0-25%;25-50%;50-75%;%   Estimated calorie intake compared to estimated need Meal intakes vary, anticipate nutrient needs are being met  PES Statement   Problem Continue previous diagnosis   Recommendations/Interventions   Malnutrition/BMI Present No   360 Statement Does not meet criteria   Summary Regular diet thin liquids  Meal completions vary  No new weight since 5/7, requested current weight be obtain as able  No new labs  Medications reviewed  No pressure areas  Compliant with medications per notes  Improved sleep per notes  Less lethargic/confused per notes  He states his appetite is good  He states he eats what he wants eats until he feels satisfied     Interventions/Recommendations Obtain current weight;Continue current diet order   Education Assessment   Education Patient/caregiver not appropriate for education at this time   Patient Nutrition Goals   Goal Avoid weight gain   Goal Status Extended   Timeframe to complete goal by next f/u   Nutrition Complexity Risk   Nutrition complexity level Low risk   Nutrition review: 06/02/22   Follow up date 06/02/22

## 2022-05-19 NOTE — NURSING NOTE
Pt is AAOXperson, place, and situation loosely  Pt is much more clear than previous shift with writer  Med compliant, pleasant, only needed redirection once today as noted by writer  Pt took ability IM long acting today, well tolerated  Denies all SI, HI, AVH, depression and anxiety  Napped off and on

## 2022-05-19 NOTE — PLAN OF CARE
Problem: Alteration in Thoughts and Perception  Goal: Verbalize thoughts and feelings  Description: Interventions:  - Promote a nonjudgmental and trusting relationship with the patient through active listening and therapeutic communication  - Assess patient's level of functioning, behavior and potential for risk  - Engage patient in 1 on 1 interactions  - Encourage patient to express fears, feelings, frustrations, and discuss symptoms    - [place] patient to reality, help patient recognize reality-based thinking   - Administer medications as ordered and assess for potential side effects  - Provide the patient education related to the signs and symptoms of the illness and desired effects of prescribed medications  Outcome: Progressing  Goal: Refrain from acting on delusional thinking/internal stimuli  Description: Interventions:  - Monitor patient closely, per order   - Utilize least restrictive measures   - Set reasonable limits, give positive feedback for acceptable   - Administer medications as ordered and monitor of potential side effects  Outcome: Progressing  Goal: Agree to be compliant with medication regime, as prescribed and report medication side effects  Description: Interventions:  - Offer appropriate PRN medication and supervise ingestion; conduct AIMS, as needed   Outcome: Progressing  Goal: Attend and participate in unit activities, including therapeutic, recreational, and educational groups  Description: Interventions:  -Encourage Visitation and family involvement in care  Outcome: Progressing  Goal: Recognize dysfunctional thoughts, communicate reality-based thoughts at the time of discharge  Description: Interventions:  - Provide medication and psycho-education to assist patient in compliance and developing insight into his/her illness   Outcome: Progressing  Goal: Complete daily ADLs, including personal hygiene independently, as able  Description: Interventions:  - Observe, teach, and assist patient with ADLS  - Monitor and promote a balance of rest/activity, with adequate nutrition and elimination   Outcome: Progressing     Problem: Depression  Goal: Verbalize thoughts and feelings  Description: Interventions:  - Assess and re-assess patient's level of risk   - Engage patient in 1:1 interactions, daily, for a minimum of 15 minutes   - Encourage patient to express feelings, fears, frustrations, hopes   Outcome: Progressing  Goal: Refrain from isolation  Description: Interventions:  - Develop a trusting relationship   - Encourage socialization   Outcome: Progressing  Goal: Attend and participate in unit activities, including therapeutic, recreational, and educational groups  Description: Interventions:  - Provide therapeutic and educational activities daily, encourage attendance and participation, and document same in the medical record   Outcome: Progressing  Goal: Complete daily ADLs, including personal hygiene independently, as able  Description: Interventions:  - Observe, teach, and assist patient with ADLS  -  Monitor and promote a balance of rest/activity, with adequate nutrition and elimination   Outcome: Progressing

## 2022-05-19 NOTE — PROGRESS NOTES
05/19/22 0924   Team Meeting   Meeting Type Daily Rounds   Team Members Present   Physician Team Member Shane garsia [place]   Nursing Team Member Evonne Dandy   Social Work Team Member Tanner   Other (Discipline and Name) Jaya Oconnell [facility]   Patient/Family Present   Patient Present No   Patient's Family Present No     Patient is withdrawn but he denies symptoms  He is disorganized when the unit is escalated, he is compliant with medications

## 2022-05-19 NOTE — NURSING NOTE
Patient withdrawn to room most of shift resting in bed  Patient denies SI HI AVH and anxiety  Patient endorses depression 4/10  Patient shows great improvement in thought process and appearance : no delusional thoughts  Patient stated tired  Zahirabritt King was compliant with his medications  Will continue to monitor and provide support  Medication changes   Abilify increase to 10mg daily and lithium d/c  Most recent depakote level on 5/15 93  Will continue to monitor and provide reassurance as needed  Q 7 minute safety and behavioral checks maintained

## 2022-05-19 NOTE — PROGRESS NOTES
Progress Note - Jane Montiel 31 y o  male MRN: [id number]    Unit/Bed#: [facility] 251-01 Encounter: [id number]        Subjective:   Patient seen and examined at bedside after reviewing the chart and discussing the case with the caring staff  Patient examined at bedside  Patient has no acute complaints  Physical Exam   Vitals: Blood pressure 96/56, pulse 66, temperature 98 °F (36 7 °C), temperature source Temporal, resp  rate 18, height 5' 6" (1 676 m), weight 78 3 kg (172 lb 9 6 oz), SpO2 98 %  ,Body mass index is 27 86 kg/m²  Constitutional:  Patient appears in no acute distress  HEENT: PERR, EOMI, MMM  Cardiovascular:  Regular rate, regular rhythm  Pulmonary/Chest: Effort normal and breath sounds normal    Abdomen: Soft, + BS, NT  Assessment/Plan:  Jane Montiel is a(n) 31y o  year old male with schizoaffective disorder bipolar type      1  Tobacco abuse  Patient has been put on nicotine transdermal patch 7 mg daily  2  Arthritis/headache  Patient may get Tylenol on as needed basis  3  GERD  Patient may take Mylanta as needed  4  Insomnia  Patient may get trazodone as needed  5  Vitamin-D deficiency  Patient started on vitamin D2 25419 units weekly for 14 weeks followed by vitamin D3 1000 units daily  6  Vitamin B12 deficiency  Patient started on vitamin B12 supplement  7  Tachycardia  Patient started on metoprolol tartrate 25 mg twice daily on 04/23/2022  Continue to monitor closely  The patient was discussed with Dr Roberto Reece and he is in agreement with the above note

## 2022-05-19 NOTE — PROGRESS NOTES
Progress Note - Behavioral Health   Jasbir Kramer 31 y o  male MRN: @MRN   Unit/Bed#: [facility] 251-01 Encounter: [id number]      Report from staff regarding this patient received and discussed, and records reviewed prior to seeing this patient  Behavior over the last 24 hours: slowly improving  The patient visible in the unit, less disorganized in his thought pattern, express paranoid ideations regarding other people in the unit but was no longer angry and agitated but dysphoric  Tolerate medications well  Patient remains anxious, appropriate, cooperative with session, cooperative with staff and delusional today  Sleep: improved  Appetite: normal  Medication side effects: No     Mental Status Evaluation:    Appearance:  dressed in hospital attire   Mood:  improved, depressed, anxious   Affect: constricted    Speech:  decreased rate, slow, increased latency of response   Thought Content:  paranoid ideation   Perceptual Disturbances: no auditory hallucinations, no visual hallucinations, denies auditory hallucinations when asked, does not appear responding to internal stimuli   Risk Potential: Suicidal ideation - None, contracts for safety on the unit  Homicidal ideation - None  Potential for aggression - Not at present   Insight:  improving and impaired   Judgment: improving   Motor Activity: no abnormal movements         Laboratory results:  I have personally reviewed all pertinent laboratory results  Progress Toward Goals: improving slowly    Assessment/Plan   Principal Problem:    Schizoaffective disorder, bipolar type ([facility] [address] )    Recommended Treatment:   Because of the patient paranoid ideations persist, adherence to medications on outpatient basis  Can be the improved by providing long-acting injectable  The patient tolerated Abilify well, and this medication will be provided as Abilify Maintena 400 mg  The same time the writer lowered haloperidol to reduce polypharmacy    The patient still benefits from Seroquel at night to help the patient with racing anxious thoughts and depressed mood  Decision was made to taper Haldol  Patient was in agreement to receive long-acting injectable  Planned medication and treatment changes: All current active medications have been reviewed  Continue treatment with group therapy, milieu therapy, occupational therapy and medication management  ** Please Note: This note has been constructed using a voice recognition system  **      BMP: No results for input(s): NA, K, CL, CO2, BUN in the last 72 hours      Invalid input(s): CREA, GLU  Vitals:    05/19/22 0746   BP: 96/56   Pulse: 66   Resp: 18   Temp: 98 °F (36 7 °C)   SpO2: 98%        Medication Administration - last 24 hours from 05/18/2022 1337 to 05/19/2022 1337       Date/Time Order Dose Route Action Action by     05/19/2022 0836 nicotine (NICODERM CQ) 7 mg/24hr TD 24 hr patch 1 patch 1 patch Transdermal Medication Applied Val Reyes RN     05/18/2022 2109 divalproex sodium (DEPAKOTE) EC tablet 1,000 mg 1,000 mg Oral Given Dhaval Wasserman RN     05/19/2022 0836 metoprolol tartrate (LOPRESSOR) tablet 25 mg 25 mg Oral Not Given Val Reyes RN     05/18/2022 2109 metoprolol tartrate (LOPRESSOR) tablet 25 mg 25 mg Oral Given Dhaval Wasserman RN     05/19/2022 0836 cyanocobalamin (VITAMIN B-12) tablet 1,000 mcg 1,000 mcg Oral Given Val Reyes RN     05/19/2022 0836 nicotine polacrilex (NICORETTE) gum 2 mg 2 mg Oral Given Val Reyes RN     05/18/2022 2110 benztropine (COGENTIN) tablet 1 mg 1 mg Oral Given Dhaval Wasserman RN     05/19/2022 0836 clonazePAM (KlonoPIN) tablet 0 5 mg 0 5 mg Oral Given Val Reyes RN     05/18/2022 1905 clonazePAM (KlonoPIN) tablet 0 5 mg 0 5 mg Oral Given Georgia Stairs, LPN     61/55/6374 5952 QUEtiapine (SEROquel) tablet 200 mg 200 mg Oral Given Dhaval Wasserman RN     05/18/2022 2110 haloperidol (HALDOL) tablet 15 mg 15 mg Oral Given Dhaval Wasserman RN 05/19/2022 0836 ARIPiprazole (ABILIFY) tablet 10 mg 10 mg Oral Given Elena Rich RN

## 2022-05-20 PROCEDURE — 99232 SBSQ HOSP IP/OBS MODERATE 35: CPT | Performed by: NURSE PRACTITIONER

## 2022-05-20 RX ORDER — CLONAZEPAM 0.5 MG/1
0.25 TABLET ORAL 2 TIMES DAILY
Status: DISCONTINUED | OUTPATIENT
Start: 2022-05-20 | End: 2022-05-23

## 2022-05-20 RX ADMIN — METOPROLOL TARTRATE 25 MG: 25 TABLET ORAL at 08:48

## 2022-05-20 RX ADMIN — DIVALPROEX SODIUM 1000 MG: 500 TABLET, DELAYED RELEASE ORAL at 22:47

## 2022-05-20 RX ADMIN — NICOTINE POLACRILEX 2 MG: 2 GUM, CHEWING BUCCAL at 20:00

## 2022-05-20 RX ADMIN — ARIPIPRAZOLE 10 MG: 10 TABLET ORAL at 08:47

## 2022-05-20 RX ADMIN — CYANOCOBALAMIN TAB 500 MCG 1000 MCG: 500 TAB at 08:48

## 2022-05-20 RX ADMIN — QUETIAPINE FUMARATE 200 MG: 200 TABLET ORAL at 22:00

## 2022-05-20 RX ADMIN — METOPROLOL TARTRATE 25 MG: 25 TABLET ORAL at 22:00

## 2022-05-20 RX ADMIN — CLONAZEPAM 0.25 MG: 0.5 TABLET ORAL at 18:18

## 2022-05-20 RX ADMIN — CLONAZEPAM 0.5 MG: 0.5 TABLET ORAL at 08:48

## 2022-05-20 RX ADMIN — HALOPERIDOL 10 MG: 10 TABLET ORAL at 22:48

## 2022-05-20 RX ADMIN — BENZTROPINE MESYLATE 1 MG: 1 TABLET ORAL at 22:46

## 2022-05-20 NOTE — PROGRESS NOTES
05/20/22 0715   Activity/Group Checklist   Group Community meeting   Attendance Attended   Attendance Duration (min) 0-15   Interactions Interacted appropriately   Affect/Mood Blunted/flat   Goals Achieved Able to listen to others; Able to self-disclose

## 2022-05-20 NOTE — PROGRESS NOTES
Progress Note - Mark Henriquez 31 y o  male MRN: [id number]    Unit/Bed#: [facility] 251-01 Encounter: [id number]        Subjective:   Patient seen and examined at bedside after reviewing the chart and discussing the case with the caring staff  Patient examined at bedside  Patient has no acute complaints  Physical Exam   Vitals: Blood pressure 109/73, pulse 85, temperature 98 6 °F (37 °C), temperature source Temporal, resp  rate 18, height 5' 6" (1 676 m), weight 77 6 kg (171 lb), SpO2 100 %  ,Body mass index is 27 6 kg/m²  Constitutional:  Patient appears in no acute distress  HEENT: PERR, EOMI, MMM  Cardiovascular:  Regular rate, regular rhythm  Pulmonary/Chest: Effort normal and breath sounds normal    Abdomen: Soft, + BS, NT  Assessment/Plan:  Mark Henriquez is a(n) 31y o  year old male with schizoaffective disorder bipolar type      1  Tobacco abuse  Patient has been put on nicotine transdermal patch 7 mg daily  2  Arthritis/headache  Patient may get Tylenol on as needed basis  3  GERD  Patient may take Mylanta as needed  4  Insomnia  Patient may get trazodone as needed  5  Vitamin-D deficiency  Patient started on vitamin D2 04819 units weekly for 14 weeks followed by vitamin D3 1000 units daily  6  Vitamin B12 deficiency  Patient started on vitamin B12 supplement  7  Tachycardia  Patient started on metoprolol tartrate 25 mg twice daily on 04/23/2022  Continue to monitor closely  The patient was discussed with Dr Emi Gaston and he is in agreement with the above note

## 2022-05-20 NOTE — SOCIAL WORK
SW met with pt in the small group therapy group for a daily check-in  Pt reports depression score 9/10 and anxiety score 8/10  Pt denies SI/HI/AH/VH and all other psych symptoms  Pt states he occasionally attends group  Pt is eating, med compliant, and is sleeping  Pt appeared sedated and slow to respond to KAREN questions  Pt is focused on discharge and voices wanting to go home to his grandmother

## 2022-05-20 NOTE — NURSING NOTE
Patient visible in unit for short periods but remained in bedroom resting in bed talking with roommate  Patient had no behavioral requiring redirections  Patients is less disorganized with thought process and speech clear and logical   Appearance also improved  Patient compliant with all medications and denies SI I AVH depression and anxiety  Patient did rec'd first Abilify Maintena 400mg IM today and tolerated well  Haldol will be tapered starting 5/20/22 to Haldol 10 mg po hs  Will continue to monitor and provide support as needed  Q 7 minute checks maintained

## 2022-05-20 NOTE — PROGRESS NOTES
Progress Note - Behavioral Health   Roz Robison 31 y o  male MRN: [id number]  Unit/Bed#: [facility] 251-01 Encounter: [id number]    Assessment/Plan   Principal Problem:    Schizoaffective disorder, bipolar type ([address] )      Behavior over the last 24 hours:  unchanged  Sleep: normal  Appetite:  Fair  Medication side effects: No  ROS: no complaints and all other systems are negative    Jerry was seen this morning for psychiatric follow-up  Remains withdrawn to room and reports he becomes upset and anxious when peers yell  Thoughts were less disorganized with no paranoid content verbalized  Reports he would like to be discharged home, but understands the team is attempting to get a hold of his family and continuing medication adjustments  Patient remains compliant with medications and meals  He has had no episodes of increased agitation or aggression towards self or others  Denies AVH/SI/HI  Received 1st injection of Abilify Maintena 400 mg IM yesterday and tolerating well with no side effects  Mental Status Evaluation:  Appearance:  age appropriate, casually dressed and Resting in bed   Behavior:  Cooperative, appropriate, pleasant   Speech:  soft   Mood:  Mildly anxious, "okay"   Affect:  mood-congruent and redirectable   Thought Process:  circumstantial   Thought Content:  Less paranoid   Perceptual Disturbances: Denies AVH, did not appear internally preoccupied   Risk Potential: Suicidal Ideations none  Homicidal Ideations none  Potential for Aggression No   Sensorium:  person, place and time/date   Memory:  recent and remote memory grossly intact   Consciousness:  alert and awake    Attention: attention span appeared shorter than expected for age   Insight:  Improving   Judgment: Improving   Gait/Station: normal gait/station and normal balance   Motor Activity: no abnormal movements     Progress Toward Goals:  Some improvement  Less disorganized, less paranoid  Maintaining sleep throughout the night    Mood controlled  Received 1st dose of Abilify Maintena 400 mg IM yesterday and will continue Q 28 days thereafter  Will taper Klonopin 0 25 mg PO BID with plan to discontinue to avoid over sedation and polypharmacy  Continue remainder of psychotropic regimen as ordered; patient tolerating well  Discharge disposition and planning remain ongoing  Recommended Treatment: Continue with group therapy, milieu therapy and occupational therapy  Risks, benefits and possible side effects of Medications:   Elgin Thorne has limited understanding of risks versus benefits of medications, but agrees to take as prescribed      Medications:   Decrease Klonopin 0 25 mg PO BID  all current active meds have been reviewed and current meds:   Current Facility-Administered Medications   Medication Dose Route Frequency    acetaminophen (TYLENOL) tablet 650 mg  650 mg Oral Q6H PRN    acetaminophen (TYLENOL) tablet 650 mg  650 mg Oral Q4H PRN    acetaminophen (TYLENOL) tablet 975 mg  975 mg Oral Q6H PRN    aluminum-magnesium hydroxide-simethicone (MYLANTA) oral suspension 30 mL  30 mL Oral Q4H PRN    ARIPiprazole (ABILIFY) tablet 10 mg  10 mg Oral Daily    ARIPiprazole ER (ABILIFY MAINTENA) IM injection 400 mg  400 mg Intramuscular Q30 Days    haloperidol lactate (HALDOL) injection 2 5 mg  2 5 mg Intramuscular Q6H PRN Max 4/day    And    LORazepam (ATIVAN) injection 1 mg  1 mg Intramuscular Q6H PRN Max 4/day    And    benztropine (COGENTIN) injection 0 5 mg  0 5 mg Intramuscular Q6H PRN Max 4/day    LORazepam (ATIVAN) injection 2 mg  2 mg Intramuscular Q4H PRN Max 4/day    And    benztropine (COGENTIN) injection 1 mg  1 mg Intramuscular Q4H PRN Max 4/day    benztropine (COGENTIN) tablet 1 mg  1 mg Oral Q6H PRN    benztropine (COGENTIN) tablet 1 mg  1 mg Oral HS    chlorproMAZINE (THORAZINE) injection SOLN 50 mg  50 mg Intramuscular Q6H PRN    chlorproMAZINE (THORAZINE) tablet 75 mg  75 mg Oral Q6H PRN    [START ON 7/26/2022] cholecalciferol (VITAMIN D3) tablet 1,000 Units  1,000 Units Oral Daily    clonazePAM (KlonoPIN) tablet 0 25 mg  0 25 mg Oral BID    cyanocobalamin (VITAMIN B-12) tablet 1,000 mcg  1,000 mcg Oral Daily    divalproex sodium (DEPAKOTE) EC tablet 1,000 mg  1,000 mg Oral HS    ergocalciferol (VITAMIN D2) capsule 50,000 Units  50,000 Units Oral Weekly    haloperidol (HALDOL) tablet 10 mg  10 mg Oral HS    haloperidol (HALDOL) tablet 15 mg  15 mg Oral HS    hydrOXYzine HCL (ATARAX) tablet 25 mg  25 mg Oral Q6H PRN Max 4/day    hydrOXYzine HCL (ATARAX) tablet 50 mg  50 mg Oral Q4H PRN Max 4/day    Or    LORazepam (ATIVAN) injection 1 mg  1 mg Intramuscular Q4H PRN    LORazepam (ATIVAN) tablet 1 mg  1 mg Oral Q6H PRN    Or    LORazepam (ATIVAN) injection 2 mg  2 mg Intramuscular Q6H PRN Max 3/day    metoprolol tartrate (LOPRESSOR) tablet 25 mg  25 mg Oral Q12H JUDIE    nicotine (NICODERM CQ) 7 mg/24hr TD 24 hr patch 1 patch  1 patch Transdermal Daily    nicotine polacrilex (NICORETTE) gum 2 mg  2 mg Oral Q2H PRN    OLANZapine (ZyPREXA ZYDIS) dispersible tablet 10 mg  10 mg Oral Q3H PRN    OLANZapine (ZyPREXA ZYDIS) dispersible tablet 5 mg  5 mg Oral Q3H PRN    OLANZapine (ZyPREXA) IM injection 10 mg  10 mg Intramuscular Q2H PRN    polyethylene glycol (MIRALAX) packet 17 g  17 g Oral Daily PRN    QUEtiapine (SEROquel) tablet 200 mg  200 mg Oral HS    traZODone (DESYREL) tablet 150 mg  150 mg Oral HS PRN     Labs: I have personally reviewed all pertinent laboratory/tests results     CMP:   Lab Results   Component Value Date    SODIUM 140 04/23/2022    K 3 6 04/23/2022     04/23/2022    CO2 30 04/23/2022    AGAP 8 04/23/2022    BUN 9 04/23/2022    CREATININE 1 04 04/23/2022    GLUC 83 04/23/2022    GLUF 83 04/23/2022    CALCIUM 9 9 04/23/2022    AST 39 04/23/2022    ALT 30 04/23/2022    ALKPHOS 58 04/23/2022    TP 7 2 04/23/2022    ALB 4 7 04/23/2022    TBILI 0 88 04/23/2022    EGFR 97 04/23/2022 Depakote:   Lab Results   Component Value Date    VALPROICTOT 93 05/15/2022     Counseling / Coordination of Care  Total floor / unit time spent today 25 minutes  Greater than 50% of total time was spent with the patient and / or family counseling and / or coordination of care   A description of the counseling / coordination of care:  Medication education, treatment plan, supportive therapy

## 2022-05-20 NOTE — SOCIAL WORK
This writer called the patient's Jose Abide ([phone]) and left a message requesting a phone call to discuss the patient  This writer will continue to follow this case

## 2022-05-20 NOTE — PROGRESS NOTES
05/20/22 0943   Team Meeting   Meeting Type Daily Rounds   Team Members Present   Team Members Present Physician;Nurse;; Other (Discipline and Name)   Physician Team Member TORSTEN Campos DR   Nursing Team Member [facility]   Social Work Team Member Tanner   Other (Discipline and Name) Ad[facility] Zhang [facility]   Patient/Family Present   Patient Present No   Patient's Family Present No     Patient received his Woody Michael, he is improved, he is compliant with medications

## 2022-05-21 PROCEDURE — 99232 SBSQ HOSP IP/OBS MODERATE 35: CPT | Performed by: NURSE PRACTITIONER

## 2022-05-21 RX ADMIN — CLONAZEPAM 0.25 MG: 0.5 TABLET ORAL at 17:55

## 2022-05-21 RX ADMIN — DIVALPROEX SODIUM 1000 MG: 500 TABLET, DELAYED RELEASE ORAL at 21:34

## 2022-05-21 RX ADMIN — BENZTROPINE MESYLATE 1 MG: 1 TABLET ORAL at 21:34

## 2022-05-21 RX ADMIN — CYANOCOBALAMIN TAB 500 MCG 1000 MCG: 500 TAB at 09:09

## 2022-05-21 RX ADMIN — NICOTINE POLACRILEX 2 MG: 2 GUM, CHEWING BUCCAL at 18:29

## 2022-05-21 RX ADMIN — QUETIAPINE FUMARATE 150 MG: 100 TABLET ORAL at 21:34

## 2022-05-21 RX ADMIN — ARIPIPRAZOLE 10 MG: 10 TABLET ORAL at 09:09

## 2022-05-21 RX ADMIN — NICOTINE POLACRILEX 2 MG: 2 GUM, CHEWING BUCCAL at 16:30

## 2022-05-21 RX ADMIN — HALOPERIDOL 10 MG: 10 TABLET ORAL at 21:34

## 2022-05-21 RX ADMIN — CLONAZEPAM 0.25 MG: 0.5 TABLET ORAL at 09:09

## 2022-05-21 NOTE — PROGRESS NOTES
Progress Note - Chong Santana 31 y o  male MRN: [id number]    Unit/Bed#: [facility] 251-01 Encounter: [id number]        Subjective:   Patient seen and examined at bedside after reviewing the chart and discussing the case with the caring staff  Patient examined at bedside  Patient has no acute complaints  Physical Exam   Vitals: Blood pressure 94/65, pulse 70, temperature 97 8 °F (36 6 °C), temperature source Temporal, resp  rate 16, height 5' 6" (1 676 m), weight 77 6 kg (171 lb), SpO2 97 %  ,Body mass index is 27 6 kg/m²  Constitutional:  Patient appears in no acute distress  HEENT: PERR, EOMI, MMM  Cardiovascular:  Regular rate, regular rhythm  Pulmonary/Chest: Effort normal and breath sounds normal    Abdomen: Soft, + BS, NT  Assessment/Plan:  Chong Santana is a(n) 31y o  year old male with schizoaffective disorder bipolar type      1  Tobacco abuse  Patient has been put on nicotine transdermal patch 7 mg daily  2  Arthritis/headache  Patient may get Tylenol on as needed basis  3  GERD  Patient may take Mylanta as needed  4  Insomnia  Patient may get trazodone as needed  5  Vitamin-D deficiency  Patient started on vitamin D2 74134 units weekly for 14 weeks followed by vitamin D3 1000 units daily  6  Vitamin B12 deficiency  Patient started on vitamin B12 supplement  7  Tachycardia  Patient started on metoprolol tartrate 25 mg twice daily on 04/23/2022  Continue to monitor closely

## 2022-05-21 NOTE — NURSING NOTE
Pt withdrawn most of day  Out and visible on unit after lunch  Mood controlled  No acute behaviors observed this shift  Speech is clear  Able to remain on topic during conversation

## 2022-05-21 NOTE — NURSING NOTE
Cathleen Wang is showing improvement daily in his appropriate engagement with his peers and staff, his hygiene and grooming and his ability to hold relevant and linear conversation  He remains a bit isolative and enjoys interacting with his roommate  He still indorses anxiety and depression and states he is looking forward to discharge  He attends some groups, is eating and does spend considerable time sleeping having been manic for an extended period  No behaviors to report  No PRNs given  Slept through the night without interruption

## 2022-05-21 NOTE — PLAN OF CARE

## 2022-05-21 NOTE — PROGRESS NOTES
Progress Note - Behavioral Health   Derik Toth 31 y o  male MRN: [id number]  Unit/Bed#: [facility] 251-01 Encounter: [id number]    Assessment/Plan   Principal Problem:    Schizoaffective disorder, bipolar type ([address] )      Behavior over the last 24 hours:  unchanged  Sleep:  Hypersomnia  Appetite: normal  Medication side effects: No  ROS: no complaints and all other systems are negative    Jerry was seen this morning for psychiatric follow-up  Mood remains controlled with no episodes of agitation or aggression  Remains somewhat isolative and reports increasing anxiety during episodes of stimulation in milieu  Patient is redirectable and his thoughts are more organized  According to nursing staff, patient continues to show improvement in thought process, hygiene, and sleep  Denies AVH/SI/HI, nor does Trinidad Case appear internally preoccupied  Has been compliant with medications and meals  Has not required PRN medications for agitation or anxiety over the past 2 days  Mental Status Evaluation:  Appearance:  age appropriate, casually dressed and Improved hygiene   Behavior:  Cooperative, withdrawn to self, calm   Speech:  soft   Mood:  Mildly anxious   Affect:  mood-congruent and redirectable   Thought Process:  More organized, goal-directed   Thought Content:  Less paranoid   Perceptual Disturbances: Denies AVH, did not appear internally preoccupied   Risk Potential: Suicidal Ideations none  Homicidal Ideations none  Potential for Aggression No   Sensorium:  person, place and time/date   Memory:  recent and remote memory grossly intact   Consciousness:  alert and awake    Attention: attention span appeared shorter than expected for age   Insight:  Improving   Judgment: Improving   Gait/Station: normal gait/station and normal balance   Motor Activity: no abnormal movements     Progress Toward Goals:  Continues to improve  Thoughts are more organized and mood controlled no episodes of agitation or aggression    Required no PRNs over the past 48 hours  Some hypersomnia observed  Will decrease Seroquel 150 mg PO QHS to avoid over-sedation and polypharmacy  Klonopin also decreased to 0 25 mg PO BID yesterday  At this time, patient continues to require multiple antipsychotics for severity of psychosis and disorganization  Plan is to further taper to dual antipsychotic therapy  Discharge disposition and planning remain ongoing  Awaiting to get hold of family members to discuss disposition planning  Recommended Treatment: Continue with group therapy, milieu therapy and occupational therapy  Risks, benefits and possible side effects of Medications:   Al Hobbs has limited understanding of risks versus benefits of medications, but agrees to take as prescribed      Medications:   Decrease Seroquel 150 mg PO QHS  all current active meds have been reviewed and current meds:   Current Facility-Administered Medications   Medication Dose Route Frequency    acetaminophen (TYLENOL) tablet 650 mg  650 mg Oral Q6H PRN    acetaminophen (TYLENOL) tablet 650 mg  650 mg Oral Q4H PRN    acetaminophen (TYLENOL) tablet 975 mg  975 mg Oral Q6H PRN    aluminum-magnesium hydroxide-simethicone (MYLANTA) oral suspension 30 mL  30 mL Oral Q4H PRN    ARIPiprazole (ABILIFY) tablet 10 mg  10 mg Oral Daily    ARIPiprazole ER (ABILIFY MAINTENA) IM injection 400 mg  400 mg Intramuscular Q30 Days    haloperidol lactate (HALDOL) injection 2 5 mg  2 5 mg Intramuscular Q6H PRN Max 4/day    And    LORazepam (ATIVAN) injection 1 mg  1 mg Intramuscular Q6H PRN Max 4/day    And    benztropine (COGENTIN) injection 0 5 mg  0 5 mg Intramuscular Q6H PRN Max 4/day    LORazepam (ATIVAN) injection 2 mg  2 mg Intramuscular Q4H PRN Max 4/day    And    benztropine (COGENTIN) injection 1 mg  1 mg Intramuscular Q4H PRN Max 4/day    benztropine (COGENTIN) tablet 1 mg  1 mg Oral Q6H PRN    benztropine (COGENTIN) tablet 1 mg  1 mg Oral HS    chlorproMAZINE (THORAZINE) injection SOLN 50 mg  50 mg Intramuscular Q6H PRN    chlorproMAZINE (THORAZINE) tablet 75 mg  75 mg Oral Q6H PRN    [START ON 7/26/2022] cholecalciferol (VITAMIN D3) tablet 1,000 Units  1,000 Units Oral Daily    clonazePAM (KlonoPIN) tablet 0 25 mg  0 25 mg Oral BID    cyanocobalamin (VITAMIN B-12) tablet 1,000 mcg  1,000 mcg Oral Daily    divalproex sodium (DEPAKOTE) EC tablet 1,000 mg  1,000 mg Oral HS    ergocalciferol (VITAMIN D2) capsule 50,000 Units  50,000 Units Oral Weekly    haloperidol (HALDOL) tablet 10 mg  10 mg Oral HS    hydrOXYzine HCL (ATARAX) tablet 25 mg  25 mg Oral Q6H PRN Max 4/day    hydrOXYzine HCL (ATARAX) tablet 50 mg  50 mg Oral Q4H PRN Max 4/day    Or    LORazepam (ATIVAN) injection 1 mg  1 mg Intramuscular Q4H PRN    LORazepam (ATIVAN) tablet 1 mg  1 mg Oral Q6H PRN    Or    LORazepam (ATIVAN) injection 2 mg  2 mg Intramuscular Q6H PRN Max 3/day    metoprolol tartrate (LOPRESSOR) tablet 25 mg  25 mg Oral Q12H JUDIE    nicotine (NICODERM CQ) 7 mg/24hr TD 24 hr patch 1 patch  1 patch Transdermal Daily    nicotine polacrilex (NICORETTE) gum 2 mg  2 mg Oral Q2H PRN    OLANZapine (ZyPREXA ZYDIS) dispersible tablet 10 mg  10 mg Oral Q3H PRN    OLANZapine (ZyPREXA ZYDIS) dispersible tablet 5 mg  5 mg Oral Q3H PRN    OLANZapine (ZyPREXA) IM injection 10 mg  10 mg Intramuscular Q2H PRN    polyethylene glycol (MIRALAX) packet 17 g  17 g Oral Daily PRN    QUEtiapine (SEROquel) tablet 150 mg  150 mg Oral HS    traZODone (DESYREL) tablet 150 mg  150 mg Oral HS PRN     Labs: I have personally reviewed all pertinent laboratory/tests results     CMP:   Lab Results   Component Value Date    SODIUM 140 04/23/2022    K 3 6 04/23/2022     04/23/2022    CO2 30 04/23/2022    AGAP 8 04/23/2022    BUN 9 04/23/2022    CREATININE 1 04 04/23/2022    GLUC 83 04/23/2022    GLUF 83 04/23/2022    CALCIUM 9 9 04/23/2022    AST 39 04/23/2022    ALT 30 04/23/2022    ALKPHOS 58 04/23/2022    TP 7 2 04/23/2022    ALB 4 7 04/23/2022    TBILI 0 88 04/23/2022    EGFR 97 04/23/2022     Depakote:   Lab Results   Component Value Date    VALPROICTOT 93 05/15/2022     Counseling / Coordination of Care  Total floor / unit time spent today 25 minutes  Greater than 50% of total time was spent with the patient and / or family counseling and / or coordination of care

## 2022-05-22 PROCEDURE — 99232 SBSQ HOSP IP/OBS MODERATE 35: CPT | Performed by: NURSE PRACTITIONER

## 2022-05-22 RX ADMIN — ARIPIPRAZOLE 10 MG: 10 TABLET ORAL at 12:02

## 2022-05-22 RX ADMIN — HALOPERIDOL 10 MG: 10 TABLET ORAL at 21:45

## 2022-05-22 RX ADMIN — QUETIAPINE FUMARATE 150 MG: 100 TABLET ORAL at 21:45

## 2022-05-22 RX ADMIN — DIVALPROEX SODIUM 1000 MG: 500 TABLET, DELAYED RELEASE ORAL at 21:45

## 2022-05-22 RX ADMIN — BENZTROPINE MESYLATE 1 MG: 1 TABLET ORAL at 21:45

## 2022-05-22 RX ADMIN — CLONAZEPAM 0.25 MG: 0.5 TABLET ORAL at 17:43

## 2022-05-22 RX ADMIN — CYANOCOBALAMIN TAB 500 MCG 1000 MCG: 500 TAB at 12:02

## 2022-05-22 RX ADMIN — CLONAZEPAM 0.25 MG: 0.5 TABLET ORAL at 12:02

## 2022-05-22 NOTE — PLAN OF CARE
Problem: Alteration in Thoughts and Perception  Goal: Treatment Goal: Gain control of psychotic behaviors/thinking, reduce/eliminate presenting symptoms and demonstrate improved reality functioning upon discharge  Outcome: Progressing  Goal: Verbalize thoughts and feelings  Description: Interventions:  - Promote a nonjudgmental and trusting relationship with the patient through active listening and therapeutic communication  - Assess patient's level of functioning, behavior and potential for risk  - Engage patient in 1 on 1 interactions  - Encourage patient to express fears, feelings, frustrations, and discuss symptoms    - [place] patient to reality, help patient recognize reality-based thinking   - Administer medications as ordered and assess for potential side effects  - Provide the patient education related to the signs and symptoms of the illness and desired effects of prescribed medications  Outcome: Progressing  Goal: Refrain from acting on delusional thinking/internal stimuli  Description: Interventions:  - Monitor patient closely, per order   - Utilize least restrictive measures   - Set reasonable limits, give positive feedback for acceptable   - Administer medications as ordered and monitor of potential side effects  Outcome: Progressing  Goal: Agree to be compliant with medication regime, as prescribed and report medication side effects  Description: Interventions:  - Offer appropriate PRN medication and supervise ingestion; conduct AIMS, as needed   Outcome: Progressing  Goal: Attend and participate in unit activities, including therapeutic, recreational, and educational groups  Description: Interventions:  -Encourage Visitation and family involvement in care  Outcome: Progressing  Goal: Recognize dysfunctional thoughts, communicate reality-based thoughts at the time of discharge  Description: Interventions:  - Provide medication and psycho-education to assist patient in compliance and developing insight into his/her illness   Outcome: Progressing  Goal: Complete daily ADLs, including personal hygiene independently, as able  Description: Interventions:  - Observe, teach, and assist patient with ADLS  - Monitor and promote a balance of rest/activity, with adequate nutrition and elimination   Outcome: Progressing

## 2022-05-22 NOTE — NURSING NOTE
Patient is calm and cooperative on the unit  Denies SI, HI, SIB, auditory and visual hallucinations  Selectively social with peers, sits in dining room and watches TV  Quiet  Patient states he got in contact with his family and they are not willing to bring his glasses  Q7 safety checks to continue

## 2022-05-22 NOTE — NURSING NOTE
Pt out for breakfast briefly and immediately retreats to room  Observed resting comfortable in bed  Napped until early afternoon  Compliant with medications as prescribed  Safety precautions maintained  Will continue to monitor and assess

## 2022-05-22 NOTE — NURSING NOTE
Pt was not present on the unit during the [id number] shift and was sleeping in his room  Pt was woken up for his HS medications  Pt had no complaints or concerns and denied ah, vh, si, hi, depression and anxiety  Pt had no behaviors and was isolative to his room  No hallucinations noted  Continuous visual safety checks performed throughout the shift  Safety precautions maintained  Will continue to monitor

## 2022-05-22 NOTE — PLAN OF CARE
Problem: Alteration in Thoughts and Perception  Goal: Treatment Goal: Gain control of psychotic behaviors/thinking, reduce/eliminate presenting symptoms and demonstrate improved reality functioning upon discharge  Outcome: Progressing  Goal: Verbalize thoughts and feelings  Description: Interventions:  - Promote a nonjudgmental and trusting relationship with the patient through active listening and therapeutic communication  - Assess patient's level of functioning, behavior and potential for risk  - Engage patient in 1 on 1 interactions  - Encourage patient to express fears, feelings, frustrations, and discuss symptoms    - [place] patient to reality, help patient recognize reality-based thinking   - Administer medications as ordered and assess for potential side effects  - Provide the patient education related to the signs and symptoms of the illness and desired effects of prescribed medications  Outcome: Progressing  Goal: Refrain from acting on delusional thinking/internal stimuli  Description: Interventions:  - Monitor patient closely, per order   - Utilize least restrictive measures   - Set reasonable limits, give positive feedback for acceptable   - Administer medications as ordered and monitor of potential side effects  Outcome: Progressing  Goal: Agree to be compliant with medication regime, as prescribed and report medication side effects  Description: Interventions:  - Offer appropriate PRN medication and supervise ingestion; conduct AIMS, as needed   Outcome: Progressing  Goal: Attend and participate in unit activities, including therapeutic, recreational, and educational groups  Description: Interventions:  -Encourage Visitation and family involvement in care  Outcome: Progressing  Goal: Recognize dysfunctional thoughts, communicate reality-based thoughts at the time of discharge  Description: Interventions:  - Provide medication and psycho-education to assist patient in compliance and developing insight into his/her illness   Outcome: Progressing  Goal: Complete daily ADLs, including personal hygiene independently, as able  Description: Interventions:  - Observe, teach, and assist patient with ADLS  - Monitor and promote a balance of rest/activity, with adequate nutrition and elimination   Outcome: Progressing     Problem: Depression  Goal: Treatment Goal: Demonstrate behavioral control of depressive symptoms, verbalize feelings of improved mood/affect, and adopt new coping skills prior to discharge  Outcome: Progressing  Goal: Verbalize thoughts and feelings  Description: Interventions:  - Assess and re-assess patient's level of risk   - Engage patient in 1:1 interactions, daily, for a minimum of 15 minutes   - Encourage patient to express feelings, fears, frustrations, hopes   Outcome: Progressing  Goal: Refrain from isolation  Description: Interventions:  - Develop a trusting relationship   - Encourage socialization   Outcome: Progressing  Goal: Refrain from self-neglect  Outcome: Progressing  Goal: Attend and participate in unit activities, including therapeutic, recreational, and educational groups  Description: Interventions:  - Provide therapeutic and educational activities daily, encourage attendance and participation, and document same in the medical record   Outcome: Progressing  Goal: Complete daily ADLs, including personal hygiene independently, as able  Description: Interventions:  - Observe, teach, and assist patient with ADLS  -  Monitor and promote a balance of rest/activity, with adequate nutrition and elimination   Outcome: Progressing     Problem: Risk for Violence/Aggression Toward Others  Goal: Treatment Goal: Refrain from acts of violence/aggression during length of stay, and demonstrate improved impulse control at the time of discharge  Outcome: Progressing  Goal: Verbalize thoughts and feelings  Description: Interventions:  - Assess and re-assess patient's level of risk, every waking shift  - Engage patient in 1:1 interactions, daily, for a minimum of 15 minutes   - Allow patient to express feelings and frustrations in a safe and non-threatening manner   - Establish rapport/trust with patient   Outcome: Progressing  Goal: Refrain from harming others  Outcome: Progressing  Goal: Refrain from destructive acts on the environment or property  Outcome: Progressing  Goal: Control angry outbursts  Description: Interventions:  - Monitor patient closely, per order  - Ensure early verbal de-escalation  - Monitor prn medication needs  - Set reasonable/therapeutic limits, outline behavioral expectations, and consequences   - Provide a non-threatening milieu, utilizing the least restrictive interventions   Outcome: Progressing  Goal: Attend and participate in unit activities, including therapeutic, recreational, and educational groups  Description: Interventions:  - Provide therapeutic and educational activities daily, encourage attendance and participation, and document same in the medical record   Outcome: Progressing  Goal: Identify appropriate positive anger management techniques  Description: Interventions:  - Offer anger management and coping skills groups   - Staff will provide positive feedback for appropriate anger control  Outcome: Progressing     Problem: DISCHARGE PLANNING  Goal: Discharge to home or other facility with appropriate resources  Description: INTERVENTIONS:  - Identify barriers to discharge w/patient and caregiver  - Arrange for needed discharge resources and transportation as appropriate  - Identify discharge learning needs (meds, wound care, etc )  - Refer to Case Management Department for coordinating discharge planning if the patient needs post-hospital services based on physician/advanced practitioner order or complex needs related to functional status, cognitive ability, or social support system  Outcome: Progressing     Problem: Nutrition/Hydration-ADULT  Goal: Nutrient/Hydration intake appropriate for improving, restoring or maintaining nutritional needs  Description: Monitor and assess patient's nutrition/hydration status for malnutrition  Collaborate with interdisciplinary team and initiate plan and interventions as ordered  Monitor patient's weight and dietary intake as ordered or per policy  Utilize nutrition screening tool and intervene as necessary  Determine patient's food preferences and provide high-protein, high-caloric foods as appropriate       INTERVENTIONS:  - Monitor oral intake, urinary output, labs, and treatment plans  - Assess nutrition and hydration status and recommend course of action  - Evaluate amount of meals eaten  - Assist patient with eating if necessary   - Allow adequate time for meals  - Recommend/ encourage appropriate diets, oral nutritional supplements, and vitamin/mineral supplements  - Order, calculate, and assess calorie counts as needed  - Recommend, monitor, and adjust tube feedings and TPN/PPN based on assessed needs  - Assess need for intravenous fluids  - Provide specific nutrition/hydration education as appropriate  - Include patient/family/caregiver in decisions related to nutrition  Outcome: Progressing

## 2022-05-22 NOTE — PROGRESS NOTES
Progress Note - Behavioral Health   Andra Ayala 31 y o  male MRN: [id number]  Unit/Bed#: [facility] 251-01 Encounter: [id number]    Assessment/Plan   Principal Problem:    Schizoaffective disorder, bipolar type ([address] )      Behavior over the last 24 hours:  unchanged  Sleep: hypersomnia  Appetite: normal  Medication side effects: No  ROS: no complaints and all other systems are negative    Monica Anand was seen today for psychiatric follow-up  He remains withdrawn to room with hypersomnia  During interview, patient reports he is feeling good and denied any anxiety or depression  He feels his thoughts are clearer any was able to maintain and linear and logical conversation  No delusional content was verbalized, nor did Monica Anand appear internally preoccupied  Denied AVH/SI/HI  He verbalized he wants to be discharged back to his grandmother's home "when it's time    He feels the medications are working for him and denies any side effects  Appetite has been adequate and sleep significantly improved  Mental Status Evaluation:  Appearance:  age appropriate and Wearing paper scrubs   Behavior:  Calm, cooperative, withdrawn   Speech:  normal pitch and normal volume   Mood:  Good   Affect:  mood-congruent and redirectable   Thought Process:  Linear, forward thinking   Thought Content:  No overt delusions or paranoia   Perceptual Disturbances: Denies AVH, does not appear internally preoccupied   Risk Potential: Suicidal Ideations none  Homicidal Ideations none  Potential for Aggression No   Sensorium:  person, place, time/date and situation   Memory:  recent and remote memory grossly intact   Consciousness:  alert and awake    Attention: attention span and concentration were age appropriate   Insight:  Improving   Judgment: Improving   Gait/Station: normal gait/station and normal balance   Motor Activity: no abnormal movements     Progress Toward Goals:  Continues to improve  Thoughts and speech are more organized and linear  Mood controlled  Has had no PRN medications for anxiety/agitation over the past 3 days  Will continue with current psychotropic regimen; patient tolerating well  Discharge disposition and planning remain ongoing  Recommended Treatment: Continue with group therapy, milieu therapy and occupational therapy  Risks, benefits and possible side effects of Medications:   Rick Gonzalez has limited understanding of risks versus benefits of medications, but agrees to take as prescribed      Medications:   all current active meds have been reviewed, continue current psychiatric medications and current meds:   Current Facility-Administered Medications   Medication Dose Route Frequency    acetaminophen (TYLENOL) tablet 650 mg  650 mg Oral Q6H PRN    acetaminophen (TYLENOL) tablet 650 mg  650 mg Oral Q4H PRN    acetaminophen (TYLENOL) tablet 975 mg  975 mg Oral Q6H PRN    aluminum-magnesium hydroxide-simethicone (MYLANTA) oral suspension 30 mL  30 mL Oral Q4H PRN    ARIPiprazole (ABILIFY) tablet 10 mg  10 mg Oral Daily    ARIPiprazole ER (ABILIFY MAINTENA) IM injection 400 mg  400 mg Intramuscular Q30 Days    haloperidol lactate (HALDOL) injection 2 5 mg  2 5 mg Intramuscular Q6H PRN Max 4/day    And    LORazepam (ATIVAN) injection 1 mg  1 mg Intramuscular Q6H PRN Max 4/day    And    benztropine (COGENTIN) injection 0 5 mg  0 5 mg Intramuscular Q6H PRN Max 4/day    LORazepam (ATIVAN) injection 2 mg  2 mg Intramuscular Q4H PRN Max 4/day    And    benztropine (COGENTIN) injection 1 mg  1 mg Intramuscular Q4H PRN Max 4/day    benztropine (COGENTIN) tablet 1 mg  1 mg Oral Q6H PRN    benztropine (COGENTIN) tablet 1 mg  1 mg Oral HS    chlorproMAZINE (THORAZINE) injection SOLN 50 mg  50 mg Intramuscular Q6H PRN    chlorproMAZINE (THORAZINE) tablet 75 mg  75 mg Oral Q6H PRN    [START ON 7/26/2022] cholecalciferol (VITAMIN D3) tablet 1,000 Units  1,000 Units Oral Daily    clonazePAM (KlonoPIN) tablet 0 25 mg  0 25 mg Oral BID    cyanocobalamin (VITAMIN B-12) tablet 1,000 mcg  1,000 mcg Oral Daily    divalproex sodium (DEPAKOTE) EC tablet 1,000 mg  1,000 mg Oral HS    ergocalciferol (VITAMIN D2) capsule 50,000 Units  50,000 Units Oral Weekly    haloperidol (HALDOL) tablet 10 mg  10 mg Oral HS    hydrOXYzine HCL (ATARAX) tablet 25 mg  25 mg Oral Q6H PRN Max 4/day    hydrOXYzine HCL (ATARAX) tablet 50 mg  50 mg Oral Q4H PRN Max 4/day    Or    LORazepam (ATIVAN) injection 1 mg  1 mg Intramuscular Q4H PRN    LORazepam (ATIVAN) tablet 1 mg  1 mg Oral Q6H PRN    Or    LORazepam (ATIVAN) injection 2 mg  2 mg Intramuscular Q6H PRN Max 3/day    metoprolol tartrate (LOPRESSOR) tablet 25 mg  25 mg Oral Q12H [address]    nicotine (NICODERM CQ) 7 mg/24hr TD 24 hr patch 1 patch  1 patch Transdermal Daily    nicotine polacrilex (NICORETTE) gum 2 mg  2 mg Oral Q2H PRN    OLANZapine (ZyPREXA ZYDIS) dispersible tablet 10 mg  10 mg Oral Q3H PRN    OLANZapine (ZyPREXA ZYDIS) dispersible tablet 5 mg  5 mg Oral Q3H PRN    OLANZapine (ZyPREXA) IM injection 10 mg  10 mg Intramuscular Q2H PRN    polyethylene glycol (MIRALAX) packet 17 g  17 g Oral Daily PRN    QUEtiapine (SEROquel) tablet 150 mg  150 mg Oral HS    traZODone (DESYREL) tablet 150 mg  150 mg Oral HS PRN     Labs: I have personally reviewed all pertinent laboratory/tests results  CMP:   Lab Results   Component Value Date    SODIUM 140 04/23/2022    K 3 6 04/23/2022     04/23/2022    CO2 30 04/23/2022    AGAP 8 04/23/2022    BUN 9 04/23/2022    CREATININE 1 04 04/23/2022    GLUC 83 04/23/2022    GLUF 83 04/23/2022    CALCIUM 9 9 04/23/2022    AST 39 04/23/2022    ALT 30 04/23/2022    ALKPHOS 58 04/23/2022    TP 7 2 04/23/2022    ALB 4 7 04/23/2022    TBILI 0 88 04/23/2022    EGFR 97 04/23/2022     Depakote:   Lab Results   Component Value Date    VALPROICTOT 93 05/15/2022     Counseling / Coordination of Care  Total floor / unit time spent today 25 minutes   Greater than 50% of total time was spent with the patient and / or family counseling and / or coordination of care   A description of the counseling / coordination of care:  Medication education, treatment plan

## 2022-05-22 NOTE — NURSING NOTE
Patient calm and cooperative on the unit  Isolative to bedroom so far this shift  Denies SI, HI, SIB, auditory and visual hallucinations  Med and meal compliant  No behaviors noted  Pleasant and approachable  Q7 safety checks to continue  Staff will continue to support per treatment plan

## 2022-05-23 PROCEDURE — 99232 SBSQ HOSP IP/OBS MODERATE 35: CPT | Performed by: HOSPITALIST

## 2022-05-23 RX ORDER — CLONAZEPAM 0.5 MG/1
0.25 TABLET ORAL DAILY
Status: COMPLETED | OUTPATIENT
Start: 2022-05-24 | End: 2022-05-25

## 2022-05-23 RX ADMIN — NICOTINE POLACRILEX 2 MG: 2 GUM, CHEWING BUCCAL at 13:54

## 2022-05-23 RX ADMIN — HALOPERIDOL 15 MG: 5 TABLET ORAL at 21:01

## 2022-05-23 RX ADMIN — DIVALPROEX SODIUM 1000 MG: 500 TABLET, DELAYED RELEASE ORAL at 21:01

## 2022-05-23 RX ADMIN — NICOTINE POLACRILEX 2 MG: 2 GUM, CHEWING BUCCAL at 18:41

## 2022-05-23 RX ADMIN — CYANOCOBALAMIN TAB 500 MCG 1000 MCG: 500 TAB at 08:52

## 2022-05-23 RX ADMIN — METOPROLOL TARTRATE 25 MG: 25 TABLET ORAL at 21:01

## 2022-05-23 RX ADMIN — ARIPIPRAZOLE 10 MG: 10 TABLET ORAL at 08:52

## 2022-05-23 RX ADMIN — CLONAZEPAM 0.25 MG: 0.5 TABLET ORAL at 08:55

## 2022-05-23 RX ADMIN — ERGOCALCIFEROL 50000 UNITS: 1.25 CAPSULE ORAL at 08:52

## 2022-05-23 RX ADMIN — BENZTROPINE MESYLATE 1 MG: 1 TABLET ORAL at 21:01

## 2022-05-23 RX ADMIN — METOPROLOL TARTRATE 25 MG: 25 TABLET ORAL at 08:52

## 2022-05-23 NOTE — PROGRESS NOTES
Progress Note - Elvis Mccord 31 y o  male MRN: [id number]    Unit/Bed#: [facility] [place] 251-01 Encounter: [id number]        Subjective:   Patient seen and examined at bedside after reviewing the chart and discussing the case with the caring staff  Patient examined at bedside  Patient has no acute complaints  Physical Exam   Vitals: Blood pressure 117/60, pulse 74, temperature 97 9 °F (36 6 °C), temperature source Temporal, resp  rate 16, height 5' 7" (1 702 m), weight 77 9 kg (171 lb 12 8 oz), SpO2 97 %  ,Body mass index is 26 91 kg/m²  Constitutional:  Patient appears in no acute distress  HEENT: PERR, EOMI, MMM  Cardiovascular:  Regular rate, regular rhythm  Pulmonary/Chest: Effort normal and breath sounds normal    Abdomen: Soft, + BS, NT  Assessment/Plan:  Elvis Mccord is a(n) 31y o  year old male with schizoaffective disorder bipolar type      1  Tobacco abuse  Patient has been put on nicotine transdermal patch 7 mg daily  2  Arthritis/headache  Patient may get Tylenol on as needed basis  3  GERD  Patient may take Mylanta as needed  4  Insomnia  Patient may get trazodone as needed  5  Vitamin-D deficiency  Patient started on vitamin D2 40537 units weekly for 14 weeks followed by vitamin D3 1000 units daily  6  Vitamin B12 deficiency  Patient started on vitamin B12 supplement  7  Tachycardia  Patient started on metoprolol tartrate 25 mg twice daily on 04/23/2022  Continue to monitor closely

## 2022-05-23 NOTE — NURSING NOTE
Assumed care of this patient from prior shift nurse at [phone]  Patient appears to be sleeping  No acute behaviors observed  No complaints voiced  Monitoring ongoing

## 2022-05-23 NOTE — CASE MANAGEMENT
Case Management Progress Note    Patient name Elliott Jones  Location [facility] 251/-88 MRN [id number]  : 1995 Date 2022       LOS (days): 35  Geometric Mean LOS (GMLOS) (days):   Days to GMLOS:        OBJECTIVE:        Current admission status: Inpatient Psych  Preferred Pharmacy:   UNKNOWN - FOLLOW UP PRIOR TO DISCHARGE TO E-PRESCRIBE  No address on file      Primary Care Provider: No primary care provider on file  [place] [employer]  Secondary Insurance:     PROGRESS NOTE:  This writer spoke with patient;s cousin Ana Maríafaiza Garridome ([phone]) regarding patient's family  Ana María Parekh reports patient lives with his mother and grandmother  He reports patient has history of mental health but he does not know the specific of his treatment because he does not live with the patient  He reports he is able to obtain the telephone number of patient's mother  Ana María Parekh provided the telephone number for [facility] [phone]  This writer will call patient's mother

## 2022-05-23 NOTE — SOCIAL WORK
This writer utilized the E  YOHANA Moreno to call the patient's mother Erick Salomon ([phone]) and left her a message requesting a follow up call

## 2022-05-23 NOTE — PROGRESS NOTES
05/23/22 0756   Activity/Group Checklist   Group   [employer] and Check-In)   Attendance Did not attend  (AT group offered, PT elected to remain in room)

## 2022-05-23 NOTE — PLAN OF CARE
Problem: Alteration in Thoughts and Perception  Goal: Treatment Goal: Gain control of psychotic behaviors/thinking, reduce/eliminate presenting symptoms and demonstrate improved reality functioning upon discharge  5/23/2022 1242 by Alley White LPN  Outcome: Progressing  5/23/2022 1232 by Alley White LPN  Outcome: Progressing     Problem: Alteration in Thoughts and Perception  Goal: Verbalize thoughts and feelings  Description: Interventions:  - Promote a nonjudgmental and trusting relationship with the patient through active listening and therapeutic communication  - Assess patient's level of functioning, behavior and potential for risk  - Engage patient in 1 on 1 interactions  - Encourage patient to express fears, feelings, frustrations, and discuss symptoms    - [place] patient to reality, help patient recognize reality-based thinking   - Administer medications as ordered and assess for potential side effects  - Provide the patient education related to the signs and symptoms of the illness and desired effects of prescribed medications  5/23/2022 1242 by Alley White LPN  Outcome: Progressing  5/23/2022 1232 by Alely White LPN  Outcome: Progressing     Problem: Alteration in Thoughts and Perception  Goal: Refrain from acting on delusional thinking/internal stimuli  Description: Interventions:  - Monitor patient closely, per order   - Utilize least restrictive measures   - Set reasonable limits, give positive feedback for acceptable   - Administer medications as ordered and monitor of potential side effects  5/23/2022 1242 by Alley White LPN  Outcome: Progressing  5/23/2022 1232 by Alley White LPN  Outcome: Progressing     Problem: Alteration in Thoughts and Perception  Goal: Agree to be compliant with medication regime, as prescribed and report medication side effects  Description: Interventions:  - Offer appropriate PRN medication and supervise ingestion; conduct AIMS, as needed   5/23/2022 1242 by Arsen Barnhart LPN  Outcome: Progressing  5/23/2022 1232 by Arsen Barnhart LPN  Outcome: Progressing     Problem: Alteration in Thoughts and Perception  Goal: Attend and participate in unit activities, including therapeutic, recreational, and educational groups  Description: Interventions:  -Encourage Visitation and family involvement in care  5/23/2022 1242 by Arsen Barnhart LPN  Outcome: Progressing  5/23/2022 1232 by Arsen Barnhart LPN  Outcome: Progressing     Problem: Alteration in Thoughts and Perception  Goal: Recognize dysfunctional thoughts, communicate reality-based thoughts at the time of discharge  Description: Interventions:  - Provide medication and psycho-education to assist patient in compliance and developing insight into his/her illness   5/23/2022 1242 by Arsen Barnhart LPN  Outcome: Progressing  5/23/2022 1232 by Arsen Barnhart LPN  Outcome: Progressing

## 2022-05-23 NOTE — NURSING NOTE
Patient appears to have slept through the overnight hours without issue  No complaints voiced  No acute behaviors observed  He remains in bed sleeping at the present time  Continuous safety rounding in progress

## 2022-05-23 NOTE — PROGRESS NOTES
05/23/22 1030   Activity/Group Checklist   Group   (Expression of Feelings and Emotions Art Therapy Processing)   Attendance Did not attend  (AT group offered, PT elected to remain in room)

## 2022-05-23 NOTE — PROGRESS NOTES
05/23/22 0858   Team Meeting   Meeting Type Daily Rounds   Team Members Present   Team Members Present Physician;Nurse;   Physician Team Member Andres [place]   Nursing Team Member Wendelin Gosselin   Social Work Team Member [address]   Patient/Family Present   Patient Present No   Patient's Family Present No     Patient is calm and cooperative  He is compliant with medications

## 2022-05-23 NOTE — NURSING NOTE
Pt was visible on the unit  Pt was minimally social with peers  Pt appeared flat but did not express any delusional thought  Pt is fixated on discharge saying he is bored here  Pt was cooperative and med compliant  Pt naps throughout the day  Pt denies depression, anxiety SI/HI/AVH  Q 7 minute safety checks were maintained

## 2022-05-23 NOTE — PROGRESS NOTES
Progress Note - Behavioral Health     Tierney Poster 31 y o  male MRN: [id number]   Unit/Bed#: U 251-01 Encounter: [id number]    Behavior over the last 24 hours: improving  Daily Hanna seen today, per staff report has been cooperative and pleasant on the unit  Patient found lying in bed resting  Was willing to walk to this provider's office  Patient discharge focused today  States he spends most of his time in bed because there is nothing to do here  Continues to have some degree of latency of thought and poverty of thought content  He however no longer has overt delusional thinking, bizarre behavior or aggressive outbursts  He denies any auditory hallucinations at this time  Denies any suicidal or homicidal ideations  Up to now family contact was not made for patient collateral information   has obtained correct phone numbers for patient's mother and will be making contact today  Sleep and appetite are fair  Patient is compliant with medication without any reported side effects          Mental Status Evaluation:    Appearance:  casually dressed, adequate grooming, looks older than stated age   Behavior:  cooperative   Speech:  delayed, loud, garbled   Mood:  anxious, irritable   Affect:  labile, increased in intensity   Thought Process:  concrete, poverty of thought   Associations: concrete associations   Thought Content:  no overt delusions   Perceptual Disturbances: no auditory hallucinations, no visual hallucinations   Risk Potential: Suicidal ideation - None at present  Homicidal ideation - None at present   Sensorium:  oriented to person, place and time/date   Memory:  recent and remote memory grossly intact   Consciousness:  alert and awake   Attention: attention span and concentration are age appropriate   Insight:  limited   Judgment: limited   Gait/Station: normal gait/station   Motor Activity: no abnormal movements     Vital signs in last 24 hours:    Temp:  [97 9 °F (36 6 °C)-98 4 °F (36 9 °C)] 97 9 °F (36 6 °C)  HR:  [74-93] 74  Resp:  [16] 16  BP: ()/(60-64) 117/60    Laboratory results: I have personally reviewed all pertinent laboratory/tests results  Assessment/Plan   Principal Problem:    Schizoaffective disorder, bipolar type ([facility] [address] )    Recommended Treatment:     Planned medication and treatment changes:  Continue Haldol, will increase to 15 mg at bedtime for ongoing adjustment for psychosis  Continue Depakote a 1000 mg daily  Continue Cogentin 1 mg at bedtime  Decrease Klonopin 0 25 daily and DC  Patient was weaned off of lithium which has now been DC and appears to have improvement in his functioning with less sedation  He has had no difficulty with mood stability off of this medication    Begin discharge planning for middle of the week as patient is stabilizing    All current active medications have been reviewed  Encourage group therapy, milieu therapy and occupational therapy  Behavioral Health checks every 7 minutes  Current Facility-Administered Medications   Medication Dose Route Frequency Provider Last Rate    acetaminophen  650 mg Oral Q6H PRN Tiffany Paul Medei, CRNP      acetaminophen  650 mg Oral Q4H PRN Tiffany Paul Medei, CRNP      acetaminophen  975 mg Oral Q6H PRN Tiffany Paul Medei, CRNP      aluminum-magnesium hydroxide-simethicone  30 mL Oral Q4H PRN Tiffany Paul Medei, CRNP      ARIPiprazole  10 mg Oral Daily Katarina Toth MD      ARIPiprazole ER  400 mg Intramuscular Q30 Days Catalino Mcneal MD      haloperidol lactate  2 5 mg Intramuscular Q6H PRN Max 4/day Tiffany Paul Medei, CRNP      And    LORazepam  1 mg Intramuscular Q6H PRN Max 4/day Tiffany Paul Medei, CRNP      And    benztropine  0 5 mg Intramuscular Q6H PRN Max 4/day Tiffany Paul Medei, CRNP      LORazepam  2 mg Intramuscular Q4H PRN Max 4/day Tiffany Paul Medei, CRNP      And    benztropine  1 mg Intramuscular Q4H PRN Max 4/day Tiffany Paul Medei, CRNP      benztropine  1 mg Oral Q6H PRN Tiffany Paul Medei, CRNP      benztropine  1 mg Oral HS Funmilayo Ace MD      chlorproMAZINE  50 mg Intramuscular Q6H PRN Mariaelena Melton MD      chlorproMAZINE  75 mg Oral Q6H PRN Mariaelena Melton MD      [START ON 7/26/2022] cholecalciferol  1,000 Units Oral Daily Shanice Garcia PA-C      [START ON 5/24/2022] clonazePAM  0 25 mg Oral Daily Funmilayo Ace MD      cyanocobalamin  1,000 mcg Oral Daily Shanice Garcia PA-C      divalproex sodium  1,000 mg Oral HS Marialeena Melton MD      ergocalciferol  50,000 Units Oral Weekly Shanice Garcia PA-C      haloperidol  15 mg Oral HS Funmilayo Ace MD      hydrOXYzine HCL  25 mg Oral Q6H PRN Max 4/day Ivanna M Medei, BRIONNA      hydrOXYzine HCL  50 mg Oral Q4H PRN Max 4/day Emily Child Medei, CRNP      Or    LORazepam  1 mg Intramuscular Q4H PRN Emily Child Medei, CRNP      LORazepam  1 mg Oral Q6H PRN Emily Child Medei, BRIONNA      Or    LORazepam  2 mg Intramuscular Q6H PRN Max 3/day Emily Child Medei, CRNP      metoprolol tartrate  25 mg Oral Q12H [address] Shanice Garcia PA-C      nicotine  1 patch Transdermal Daily Emily Child Medei, CRNP      nicotine polacrilex  2 mg Oral Q2H PRN Shanice Garcia PA-C      OLANZapine  10 mg Oral Q3H PRN Mariaelena Melton MD      OLANZapine  5 mg Oral Q3H PRN Mariaelena Melton MD      OLANZapine  10 mg Intramuscular Q2H PRN Mariaelena Melton MD      polyethylene glycol  17 g Oral Daily PRN Emily Child Medei, CRNP      traZODone  150 mg Oral HS PRN Emily Child Medei, BRIONNA         Risks / Benefits of Treatment:    Risks, benefits, and possible side effects of medications explained to patient and patient verbalizes understanding and agreement for treatment  Counseling / Coordination of Care: Total floor / unit time spent today 25 minutes  Greater than 50% of total time was spent with the patient and / or family counseling and / or coordination of care   A description of counseling / coordination of care:  Patient's progress discussed with staff in treatment team meeting  Medications, treatment progress and treatment plan reviewed with patient      Miriam Prescott MD 05/23/22

## 2022-05-23 NOTE — PLAN OF CARE
Problem: Alteration in Thoughts and Perception  Goal: Treatment Goal: Gain control of psychotic behaviors/thinking, reduce/eliminate presenting symptoms and demonstrate improved reality functioning upon discharge  Outcome: Progressing     Problem: Alteration in Thoughts and Perception  Goal: Verbalize thoughts and feelings  Description: Interventions:  - Promote a nonjudgmental and trusting relationship with the patient through active listening and therapeutic communication  - Assess patient's level of functioning, behavior and potential for risk  - Engage patient in 1 on 1 interactions  - Encourage patient to express fears, feelings, frustrations, and discuss symptoms    - [place] patient to reality, help patient recognize reality-based thinking   - Administer medications as ordered and assess for potential side effects  - Provide the patient education related to the signs and symptoms of the illness and desired effects of prescribed medications  Outcome: Progressing     Problem: Alteration in Thoughts and Perception  Goal: Refrain from acting on delusional thinking/internal stimuli  Description: Interventions:  - Monitor patient closely, per order   - Utilize least restrictive measures   - Set reasonable limits, give positive feedback for acceptable   - Administer medications as ordered and monitor of potential side effects  Outcome: Progressing     Problem: Alteration in Thoughts and Perception  Goal: Agree to be compliant with medication regime, as prescribed and report medication side effects  Description: Interventions:  - Offer appropriate PRN medication and supervise ingestion; conduct AIMS, as needed   Outcome: Progressing     Problem: Alteration in Thoughts and Perception  Goal: Attend and participate in unit activities, including therapeutic, recreational, and educational groups  Description: Interventions:  -Encourage Visitation and family involvement in care  Outcome: Progressing     Problem: Alteration in Thoughts and Perception  Goal: Recognize dysfunctional thoughts, communicate reality-based thoughts at the time of discharge  Description: Interventions:  - Provide medication and psycho-education to assist patient in compliance and developing insight into his/her illness   Outcome: Progressing     Problem: Alteration in Thoughts and Perception  Goal: Complete daily ADLs, including personal hygiene independently, as able  Description: Interventions:  - Observe, teach, and assist patient with ADLS  - Monitor and promote a balance of rest/activity, with adequate nutrition and elimination   Outcome: Progressing

## 2022-05-24 PROCEDURE — 99232 SBSQ HOSP IP/OBS MODERATE 35: CPT | Performed by: NURSE PRACTITIONER

## 2022-05-24 RX ADMIN — NICOTINE POLACRILEX 2 MG: 2 GUM, CHEWING BUCCAL at 08:27

## 2022-05-24 RX ADMIN — NICOTINE POLACRILEX 2 MG: 2 GUM, CHEWING BUCCAL at 13:33

## 2022-05-24 RX ADMIN — CYANOCOBALAMIN TAB 500 MCG 1000 MCG: 500 TAB at 08:35

## 2022-05-24 RX ADMIN — DIVALPROEX SODIUM 1000 MG: 500 TABLET, DELAYED RELEASE ORAL at 20:58

## 2022-05-24 RX ADMIN — METOPROLOL TARTRATE 25 MG: 25 TABLET ORAL at 20:58

## 2022-05-24 RX ADMIN — CLONAZEPAM 0.25 MG: 0.5 TABLET ORAL at 08:20

## 2022-05-24 RX ADMIN — NICOTINE POLACRILEX 2 MG: 2 GUM, CHEWING BUCCAL at 17:17

## 2022-05-24 RX ADMIN — ARIPIPRAZOLE 10 MG: 10 TABLET ORAL at 08:20

## 2022-05-24 RX ADMIN — BENZTROPINE MESYLATE 1 MG: 1 TABLET ORAL at 20:57

## 2022-05-24 RX ADMIN — METOPROLOL TARTRATE 25 MG: 25 TABLET ORAL at 08:20

## 2022-05-24 RX ADMIN — HALOPERIDOL 15 MG: 5 TABLET ORAL at 20:58

## 2022-05-24 NOTE — SOCIAL WORK
KAREN left  for Bet YOVANI [facility]  AT [place] for new pt intake  HERNANDEZ signed:  Jayne Ndiaye  [address] [place] [address] left vm for Tomasz Ignacio, regarding pt dc possible tomorrow  Requested call back   Sw will follow up at a later time    HERNANDEZ signed:   Radha Rodriguez [phone]

## 2022-05-24 NOTE — SOCIAL WORK
KAREN utilized the E  YOHANA Moreno ([place], [address]) to call the patient's mother Sriknath Britton ([phone]) and left her a message requesting a follow up call for DC tomorrow

## 2022-05-24 NOTE — SOCIAL WORK
Pt signed HERNANDEZ for [facility]  Sw called and left VM for new pt intake  Requested call back      [address]  [phone]  Fax: [phone]

## 2022-05-24 NOTE — SOCIAL WORK
Pt uncle Catherine Taylor ([phone]) called SW back via phone call  Katerin cannot  pt due to living in [place]  Catherine Taylor gave SW another number to reach pt's mother ([phone])  SW called and left a vm on this number and requested a phone call back

## 2022-05-24 NOTE — SOCIAL WORK
KAREN spoke to Justin at [facility] ([phone], ext [phone])  Justin stated she needs to verify is she can accept pt as a new pt  She will be reaching out to her supervisor and the insurance provider Medicaid  Justin will follow up with KAREN as soon as she gets a response  KAREN will also follow up with Mandy at a later time

## 2022-05-24 NOTE — PLAN OF CARE
Problem: Alteration in Thoughts and Perception  Goal: Treatment Goal: Gain control of psychotic behaviors/thinking, reduce/eliminate presenting symptoms and demonstrate improved reality functioning upon discharge  Outcome: Progressing  Goal: Verbalize thoughts and feelings  Description: Interventions:  - Promote a nonjudgmental and trusting relationship with the patient through active listening and therapeutic communication  - Assess patient's level of functioning, behavior and potential for risk  - Engage patient in 1 on 1 interactions  - Encourage patient to express fears, feelings, frustrations, and discuss symptoms    - [place] patient to reality, help patient recognize reality-based thinking   - Administer medications as ordered and assess for potential side effects  - Provide the patient education related to the signs and symptoms of the illness and desired effects of prescribed medications  Outcome: Progressing  Goal: Refrain from acting on delusional thinking/internal stimuli  Description: Interventions:  - Monitor patient closely, per order   - Utilize least restrictive measures   - Set reasonable limits, give positive feedback for acceptable   - Administer medications as ordered and monitor of potential side effects  Outcome: Progressing  Goal: Agree to be compliant with medication regime, as prescribed and report medication side effects  Description: Interventions:  - Offer appropriate PRN medication and supervise ingestion; conduct AIMS, as needed   Outcome: Progressing  Goal: Attend and participate in unit activities, including therapeutic, recreational, and educational groups  Description: Interventions:  -Encourage Visitation and family involvement in care  Outcome: Progressing  Goal: Recognize dysfunctional thoughts, communicate reality-based thoughts at the time of discharge  Description: Interventions:  - Provide medication and psycho-education to assist patient in compliance and developing insight into his/her illness   Outcome: Progressing  Goal: Complete daily ADLs, including personal hygiene independently, as able  Description: Interventions:  - Observe, teach, and assist patient with ADLS  - Monitor and promote a balance of rest/activity, with adequate nutrition and elimination   Outcome: Progressing     Problem: Depression  Goal: Treatment Goal: Demonstrate behavioral control of depressive symptoms, verbalize feelings of improved mood/affect, and adopt new coping skills prior to discharge  Outcome: Progressing  Goal: Verbalize thoughts and feelings  Description: Interventions:  - Assess and re-assess patient's level of risk   - Engage patient in 1:1 interactions, daily, for a minimum of 15 minutes   - Encourage patient to express feelings, fears, frustrations, hopes   Outcome: Progressing  Goal: Refrain from isolation  Description: Interventions:  - Develop a trusting relationship   - Encourage socialization   Outcome: Progressing  Goal: Refrain from self-neglect  Outcome: Progressing  Goal: Attend and participate in unit activities, including therapeutic, recreational, and educational groups  Description: Interventions:  - Provide therapeutic and educational activities daily, encourage attendance and participation, and document same in the medical record   Outcome: Progressing  Goal: Complete daily ADLs, including personal hygiene independently, as able  Description: Interventions:  - Observe, teach, and assist patient with ADLS  -  Monitor and promote a balance of rest/activity, with adequate nutrition and elimination   Outcome: Progressing     Problem: Risk for Violence/Aggression Toward Others  Goal: Treatment Goal: Refrain from acts of violence/aggression during length of stay, and demonstrate improved impulse control at the time of discharge  Outcome: Progressing  Goal: Verbalize thoughts and feelings  Description: Interventions:  - Assess and re-assess patient's level of risk, every waking shift  - Engage patient in 1:1 interactions, daily, for a minimum of 15 minutes   - Allow patient to express feelings and frustrations in a safe and non-threatening manner   - Establish rapport/trust with patient   Outcome: Progressing  Goal: Refrain from harming others  Outcome: Progressing  Goal: Refrain from destructive acts on the environment or property  Outcome: Progressing  Goal: Control angry outbursts  Description: Interventions:  - Monitor patient closely, per order  - Ensure early verbal de-escalation  - Monitor prn medication needs  - Set reasonable/therapeutic limits, outline behavioral expectations, and consequences   - Provide a non-threatening milieu, utilizing the least restrictive interventions   Outcome: Progressing  Goal: Attend and participate in unit activities, including therapeutic, recreational, and educational groups  Description: Interventions:  - Provide therapeutic and educational activities daily, encourage attendance and participation, and document same in the medical record   Outcome: Progressing  Goal: Identify appropriate positive anger management techniques  Description: Interventions:  - Offer anger management and coping skills groups   - Staff will provide positive feedback for appropriate anger control  Outcome: Progressing     Problem: Ineffective Coping  Goal: Participates in unit activities  Description: Interventions:  - Provide therapeutic environment   - Provide required programming   - Redirect inappropriate behaviors   Outcome: Progressing     Problem: DISCHARGE PLANNING  Goal: Discharge to home or other facility with appropriate resources  Description: INTERVENTIONS:  - Identify barriers to discharge w/patient and caregiver  - Arrange for needed discharge resources and transportation as appropriate  - Identify discharge learning needs (meds, wound care, etc )  - Refer to Case Management Department for coordinating discharge planning if the patient needs post-hospital services based on physician/advanced practitioner order or complex needs related to functional status, cognitive ability, or social support system  Outcome: Progressing     Problem: Nutrition/Hydration-ADULT  Goal: Nutrient/Hydration intake appropriate for improving, restoring or maintaining nutritional needs  Description: Monitor and assess patient's nutrition/hydration status for malnutrition  Collaborate with interdisciplinary team and initiate plan and interventions as ordered  Monitor patient's weight and dietary intake as ordered or per policy  Utilize nutrition screening tool and intervene as necessary  Determine patient's food preferences and provide high-protein, high-caloric foods as appropriate       INTERVENTIONS:  - Monitor oral intake, urinary output, labs, and treatment plans  - Assess nutrition and hydration status and recommend course of action  - Evaluate amount of meals eaten  - Assist patient with eating if necessary   - Allow adequate time for meals  - Recommend/ encourage appropriate diets, oral nutritional supplements, and vitamin/mineral supplements  - Order, calculate, and assess calorie counts as needed  - Recommend, monitor, and adjust tube feedings and TPN/PPN based on assessed needs  - Assess need for intravenous fluids  - Provide specific nutrition/hydration education as appropriate  - Include patient/family/caregiver in decisions related to nutrition  Outcome: Progressing

## 2022-05-24 NOTE — PROGRESS NOTES
"Subjective     Mily Grullon is a 40 year old female who presents to clinic today for the following health issues:    HPI            Chief Complaint   Patient presents with     Fall     Fell in the clinic parking lot while picking up her RX today. Left knee and calf are painful.        Patient was walking in parking lot with cane in snowy/icy weather and slipped falling onto the lateral aspect of left knee.  Patient notes pain at lateral/posterior aspect of left lower leg.  No head trauma or pain elsewhere.  Patient has history of significant osteoporosis and is currently undergoing treatment with Forte[employer].      Review of Systems   Constitutional: Negative for fever.   HENT: Negative for congestion and sore throat.    Respiratory: Negative for cough and shortness of breath.    Cardiovascular: Negative for chest pain.   Gastrointestinal: Negative for abdominal pain.   Musculoskeletal:        Pain at left lower extremity, distal to knee, lateral aspect   Skin: Negative for rash.   Neurological: Negative for light-headedness.   Psychiatric/Behavioral: The patient is not nervous/anxious.             Objective    /74   Pulse 90   Temp 96.4  F (35.8  C) (Tympanic)   Ht 1.549 m (5' 1\")   Wt 84.4 kg (186 lb)   SpO2 100%   BMI 35.14 kg/m    Body mass index is 35.14 kg/m .  Physical Exam  Vitals signs and nursing note reviewed.   Constitutional:       General: She is not in acute distress.     Appearance: Normal appearance.   HENT:      Head: Normocephalic and atraumatic.   Eyes:      Extraocular Movements: Extraocular movements intact.      Pupils: Pupils are equal, round, and reactive to light.   Neck:      Musculoskeletal: Normal range of motion.   Cardiovascular:      Rate and Rhythm: Normal rate and regular rhythm.      Heart sounds: Normal heart sounds.   Pulmonary:      Effort: Pulmonary effort is normal.      Breath sounds: Normal breath sounds.   Musculoskeletal: Normal range of motion.      Comments: " 05/24/22 1030   Activity/Group Checklist   Group   (Emotional Thought [employer] Processing)   Attendance Did not attend  (AT group offered, PT elected to remain in room) Patient able to ambulate easily with cane  Normal range motion of bilateral upper extremities without tenderness to palpation.  No tenderness to palpation of cervical, thoracic, or lumbar spine.  Normal range of motion of right lower extremity without tenderness to palpation.  Normal range of motion of left hip, ankle, and foot without tenderness palpation.  Left knee: Tender to palpation at lateral aspect of lower extremity at tibial plateau extending to mid calf.  No deformity, erythema, ecchymosis, swelling.   Skin:     General: Skin is warm and dry.   Neurological:      General: No focal deficit present.      Mental Status: She is alert.   Psychiatric:         Mood and Affect: Mood normal.         Behavior: Behavior normal.            Xray -left knee, 3 views: Per my interpretation, no acute fracture.        Assessment & Plan     Fall, initial encounter  Acute pain of left knee  Patient is a 40-year-old female with past medical history significant for osteoporosis who presents to clinic after fall in clinic parking lot with left knee pain.  Vital signs normal.  Physical exam significant for tenderness palpation of lateral aspect of left lower extremity without deformity, erythema, ecchymosis, swelling.  Patient able to ambulate with cane.  X-rays negative for fracture.  Symptoms likely due to contusion/muscle strain.  Recommended Tylenol, rest, heat/ice.  Recommended follow-up if symptoms worsening, or not improving with treatment plan.  - XR Knee Left 3 Views       See Patient Instructions    Return in about 2 weeks (around 11/3/2020), or if symptoms worsen or fail to improve.    Stacey Graham PA-C  [facility]

## 2022-05-24 NOTE — NURSING NOTE
Patient compliant with meds and meals  Visible on unit  Denies everything is worried about d/c stated his family is not answering and he cant remember her number  Patient is pleasant and cooperative  Q 7 min behavioral and safety checks in place

## 2022-05-24 NOTE — PROGRESS NOTES
Progress Note - Scot Wade 31 y o  male MRN: [id number]    Unit/Bed#: [facility] 251-01 Encounter: [id number]        Subjective:   Patient seen and examined at bedside after reviewing the chart and discussing the case with the caring staff  Patient examined at bedside  Patient has no acute complaints  Patient is a possible discharge this week  Physical Exam   Vitals: Blood pressure 113/63, pulse 83, temperature 98 7 °F (37 1 °C), temperature source Temporal, resp  rate 18, height 5' 7" (1 702 m), weight 77 9 kg (171 lb 12 8 oz), SpO2 98 %  ,Body mass index is 26 91 kg/m²  Constitutional:  Patient appears in no acute distress  HEENT: PERR, EOMI, MMM  Cardiovascular:  Regular rate, regular rhythm  Pulmonary/Chest: Effort normal and breath sounds normal    Abdomen: Soft, + BS, NT  Assessment/Plan:  Scot Wade is a(n) 31y o  year old male with schizoaffective disorder bipolar type      1  Tobacco abuse  Patient has been put on nicotine transdermal patch 7 mg daily  2  Arthritis/headache  Patient may get Tylenol on as needed basis  3  GERD  Patient may take Mylanta as needed  4  Insomnia  Patient may get trazodone as needed  5  Vitamin-D deficiency  Patient started on vitamin D2 57873 units weekly for 14 weeks followed by vitamin D3 1000 units daily  6  Vitamin B12 deficiency  Patient started on vitamin B12 supplement  7  Tachycardia  Patient started on metoprolol tartrate 25 mg twice daily on 04/23/2022  Continue to monitor closely

## 2022-05-24 NOTE — PROGRESS NOTES
Progress Note - Behavioral Health   Elvis Manchester 31 y o  male MRN: [id number]  Unit/Bed#: U 251-01 Encounter: [id number]    Assessment/Plan   Principal Problem:    Schizoaffective disorder, bipolar type ([address] )      Behavior over the last 24 hours:  unchanged  Sleep: normal  Appetite: normal  Medication side effects: No  ROS: no complaints and all other systems are negative    Martha Guillen was seen this morning for psychiatric follow-up  Patient was calm, cooperative, and engaged appropriately in interview  Thoughts and speech were linear and goal-directed  No delusional content was verbalized  Reports intermittent anxiety regarding select peers/stimulation in milieu  Denies depression, AVH/SI/HI  Patient expresses readiness for discharge and verbalized I know you need to get a hold of my family first before I can go back home  I'm trying to get a hold of them as well    Insight appears to be improving  Patient has been compliant with medications and meals and denies any side effects  Mood has been controlled no episodes of agitation or aggression  Required no PRN psychotropic for agitation or anxiety over the past 4 days  Attended group this afternoon      Mental Status Evaluation:  Appearance:  age appropriate, casually dressed and Improved hygiene   Behavior:  Cooperative, calm, improved eye contact   Speech:  normal pitch and normal volume   Mood:  Mildly anxious   Affect:  mood-congruent and redirectable   Thought Process:  goal directed and Linear   Thought Content:  No overt delusions or paranoia   Perceptual Disturbances: Denies AVH, does not appear internally preoccupied   Risk Potential: Suicidal Ideations none  Homicidal Ideations none  Potential for Aggression No   Sensorium:  person, place, time/date and situation   Memory:  recent and remote memory grossly intact   Consciousness:  alert and awake    Attention: attention span and concentration were age appropriate   Insight:  Improving   Judgment: Improving   Gait/Station: normal gait/station and normal balance   Motor Activity: no abnormal movements     Progress Toward Goals:  Continues to improve  Thoughts are organized and goal-directed  No longer appears internally preoccupied and mood is controlled with no behavioral outbursts  Compliant with treatment  Next dose of Abilify Maintena 400 mg IM due 06/17/2022  Tolerating Klonopin taper well with no adverse reactions  Continue current psychotropic regimen  Anticipated discharge to home tomorrow, 05/25/2022, contingent on family contact  Will follow-up with [facility] and [place]  Recommended Treatment: Continue with group therapy, milieu therapy and occupational therapy  Risks, benefits and possible side effects of Medications:   Alexia King has limited understanding of risks versus benefits of medications, but agrees to take as prescribed      Medications:   all current active meds have been reviewed, continue current psychiatric medications and current meds:   Current Facility-Administered Medications   Medication Dose Route Frequency    acetaminophen (TYLENOL) tablet 650 mg  650 mg Oral Q6H PRN    acetaminophen (TYLENOL) tablet 650 mg  650 mg Oral Q4H PRN    acetaminophen (TYLENOL) tablet 975 mg  975 mg Oral Q6H PRN    aluminum-magnesium hydroxide-simethicone (MYLANTA) oral suspension 30 mL  30 mL Oral Q4H PRN    ARIPiprazole (ABILIFY) tablet 10 mg  10 mg Oral Daily    ARIPiprazole ER (ABILIFY MAINTENA) IM injection 400 mg  400 mg Intramuscular Q30 Days    haloperidol lactate (HALDOL) injection 2 5 mg  2 5 mg Intramuscular Q6H PRN Max 4/day    And    LORazepam (ATIVAN) injection 1 mg  1 mg Intramuscular Q6H PRN Max 4/day    And    benztropine (COGENTIN) injection 0 5 mg  0 5 mg Intramuscular Q6H PRN Max 4/day    LORazepam (ATIVAN) injection 2 mg  2 mg Intramuscular Q4H PRN Max 4/day    And    benztropine (COGENTIN) injection 1 mg  1 mg Intramuscular Q4H PRN Max 4/day    benztropine (COGENTIN) tablet 1 mg  1 mg Oral Q6H PRN    benztropine (COGENTIN) tablet 1 mg  1 mg Oral HS    chlorproMAZINE (THORAZINE) injection SOLN 50 mg  50 mg Intramuscular Q6H PRN    chlorproMAZINE (THORAZINE) tablet 75 mg  75 mg Oral Q6H PRN    [START ON 7/26/2022] cholecalciferol (VITAMIN D3) tablet 1,000 Units  1,000 Units Oral Daily    clonazePAM (KlonoPIN) tablet 0 25 mg  0 25 mg Oral Daily    cyanocobalamin (VITAMIN B-12) tablet 1,000 mcg  1,000 mcg Oral Daily    divalproex sodium (DEPAKOTE) EC tablet 1,000 mg  1,000 mg Oral HS    ergocalciferol (VITAMIN D2) capsule 50,000 Units  50,000 Units Oral Weekly    haloperidol (HALDOL) tablet 15 mg  15 mg Oral HS    hydrOXYzine HCL (ATARAX) tablet 25 mg  25 mg Oral Q6H PRN Max 4/day    hydrOXYzine HCL (ATARAX) tablet 50 mg  50 mg Oral Q4H PRN Max 4/day    Or    LORazepam (ATIVAN) injection 1 mg  1 mg Intramuscular Q4H PRN    LORazepam (ATIVAN) tablet 1 mg  1 mg Oral Q6H PRN    Or    LORazepam (ATIVAN) injection 2 mg  2 mg Intramuscular Q6H PRN Max 3/day    metoprolol tartrate (LOPRESSOR) tablet 25 mg  25 mg Oral Q12H [address]    nicotine (NICODERM CQ) 7 mg/24hr TD 24 hr patch 1 patch  1 patch Transdermal Daily    nicotine polacrilex (NICORETTE) gum 2 mg  2 mg Oral Q2H PRN    OLANZapine (ZyPREXA ZYDIS) dispersible tablet 10 mg  10 mg Oral Q3H PRN    OLANZapine (ZyPREXA ZYDIS) dispersible tablet 5 mg  5 mg Oral Q3H PRN    OLANZapine (ZyPREXA) IM injection 10 mg  10 mg Intramuscular Q2H PRN    polyethylene glycol (MIRALAX) packet 17 g  17 g Oral Daily PRN    traZODone (DESYREL) tablet 150 mg  150 mg Oral HS PRN     Labs: I have personally reviewed all pertinent laboratory/tests results    Counseling / Coordination of Care  Total floor / unit time spent today 30 minutes  Greater than 50% of total time was spent with the patient and / or family counseling and / or coordination of care   A description of the counseling / coordination of care: medication education, treatment plan, safety/discharge planning

## 2022-05-24 NOTE — PROGRESS NOTES
05/24/22 0720   Activity/Group Checklist   Group   [employer] and Check-In)   Attendance Did not attend  (Group offered, PT elected to remain in room)

## 2022-05-24 NOTE — SOCIAL WORK
KAREN called pt Vikki Paige ([phone]) and asked him to contact pt's mother  KAREN explained urgency behind a return call from pt mother in regard to possible pt dc tomorrow

## 2022-05-25 VITALS
TEMPERATURE: 98.8 F | SYSTOLIC BLOOD PRESSURE: 101 MMHG | HEART RATE: 89 BPM | OXYGEN SATURATION: 98 % | RESPIRATION RATE: 18 BRPM | WEIGHT: 171.8 LBS | BODY MASS INDEX: 26.97 KG/M2 | HEIGHT: 67 IN | DIASTOLIC BLOOD PRESSURE: 64 MMHG

## 2022-05-25 DIAGNOSIS — F25.0 SCHIZOAFFECTIVE DISORDER, BIPOLAR TYPE (HCC): Primary | ICD-10-CM

## 2022-05-25 PROCEDURE — 99238 HOSP IP/OBS DSCHRG MGMT 30/<: CPT | Performed by: HOSPITALIST

## 2022-05-25 RX ORDER — BENZTROPINE MESYLATE 1 MG/1
1 TABLET ORAL
Qty: 30 TABLET | Refills: 1 | Status: SHIPPED | OUTPATIENT
Start: 2022-05-25 | End: 2022-06-24

## 2022-05-25 RX ORDER — ARIPIPRAZOLE 400 MG
400 KIT INTRAMUSCULAR
Refills: 0
Start: 2022-06-18

## 2022-05-25 RX ORDER — DIVALPROEX SODIUM 500 MG/1
1000 TABLET, EXTENDED RELEASE ORAL DAILY
Status: DISCONTINUED | OUTPATIENT
Start: 2022-05-26 | End: 2022-05-25

## 2022-05-25 RX ORDER — ERGOCALCIFEROL 1.25 MG/1
50000 CAPSULE ORAL WEEKLY
Qty: 8 CAPSULE | Refills: 0
Start: 2022-05-30 | End: 2022-05-25 | Stop reason: SDUPTHER

## 2022-05-25 RX ORDER — DIVALPROEX SODIUM 500 MG/1
1000 TABLET, EXTENDED RELEASE ORAL DAILY
Status: DISCONTINUED | OUTPATIENT
Start: 2022-05-25 | End: 2022-05-25 | Stop reason: HOSPADM

## 2022-05-25 RX ORDER — DIVALPROEX SODIUM 500 MG/1
1000 TABLET, EXTENDED RELEASE ORAL DAILY
Qty: 60 TABLET | Refills: 0 | Status: SHIPPED | OUTPATIENT
Start: 2022-05-26 | End: 2022-06-25

## 2022-05-25 RX ORDER — DIVALPROEX SODIUM 500 MG/1
1000 TABLET, DELAYED RELEASE ORAL
Qty: 60 TABLET | Refills: 1 | Status: SHIPPED | OUTPATIENT
Start: 2022-05-25 | End: 2022-05-25

## 2022-05-25 RX ORDER — ARIPIPRAZOLE 10 MG/1
10 TABLET ORAL DAILY
Qty: 30 TABLET | Refills: 1 | Status: SHIPPED | OUTPATIENT
Start: 2022-05-26 | End: 2022-06-25

## 2022-05-25 RX ORDER — MELATONIN
1000 DAILY
Qty: 30 TABLET | Refills: 0
Start: 2022-07-26 | End: 2022-05-25 | Stop reason: SDUPTHER

## 2022-05-25 RX ORDER — DIVALPROEX SODIUM 500 MG/1
1000 TABLET, EXTENDED RELEASE ORAL DAILY
Qty: 60 TABLET | Refills: 1 | Status: SHIPPED | OUTPATIENT
Start: 2022-05-25 | End: 2022-06-24

## 2022-05-25 RX ORDER — ERGOCALCIFEROL 1.25 MG/1
50000 CAPSULE ORAL WEEKLY
Qty: 8 CAPSULE | Refills: 0 | Status: SHIPPED | OUTPATIENT
Start: 2022-05-30 | End: 2022-07-26

## 2022-05-25 RX ORDER — MELATONIN
1000 DAILY
Qty: 30 TABLET | Refills: 0 | Status: SHIPPED | OUTPATIENT
Start: 2022-07-26

## 2022-05-25 RX ORDER — HALOPERIDOL 5 MG
15 TABLET ORAL
Qty: 90 TABLET | Refills: 1 | Status: SHIPPED | OUTPATIENT
Start: 2022-05-25 | End: 2022-06-24

## 2022-05-25 RX ADMIN — ARIPIPRAZOLE 10 MG: 10 TABLET ORAL at 08:51

## 2022-05-25 RX ADMIN — CYANOCOBALAMIN TAB 500 MCG 1000 MCG: 500 TAB at 08:51

## 2022-05-25 RX ADMIN — METOPROLOL TARTRATE 25 MG: 25 TABLET ORAL at 08:51

## 2022-05-25 RX ADMIN — DIVALPROEX SODIUM 1000 MG: 500 TABLET, EXTENDED RELEASE ORAL at 15:40

## 2022-05-25 RX ADMIN — NICOTINE POLACRILEX 2 MG: 2 GUM, CHEWING BUCCAL at 13:55

## 2022-05-25 RX ADMIN — CLONAZEPAM 0.25 MG: 0.5 TABLET ORAL at 08:51

## 2022-05-25 RX ADMIN — NICOTINE POLACRILEX 2 MG: 2 GUM, CHEWING BUCCAL at 08:51

## 2022-05-25 NOTE — DISCHARGE INSTR - APPOINTMENTS
Jackie Machuca or Lyn, our Cornelio and Chloe, will be calling you after your discharge, on the phone number that you provided  They will be available as an additional support, if needed  If you wish to speak with one of them, you may contact Jose Moore at [phone] or Gustavo Perea at [phone]  Please go to [address] [place] [address]  to complete an Intake for Outpatient services  A referral; for Case Management services was submitted to [facility]  They will contact you in seven days to schedule an intake   If they do not call you, please call them at [phone]

## 2022-05-25 NOTE — PROGRESS NOTES
05/25/22 1030   Activity/Group Checklist   Group   ([employer] and [employer])   Attendance   (AT group offered, PT elected to remain in room)

## 2022-05-25 NOTE — PROGRESS NOTES
05/25/22 0742   Activity/Group Checklist   Group   [employer] and Check-In)   Attendance Did not attend  (Group offered, PT elected to remain in room)

## 2022-05-25 NOTE — NURSING NOTE
Patient visible on unit for short periods but remains in room for most of shift  Patient compliant with meds did not utilize any PRNs  Thought process logical speech clear and calm w  Normal volume the patient Patient  Denies everything but states worried about d/c due to uncertainty to where he will be going  Uncle unable to take him due to his living location and no answer at mom's  [facility]  No medication changes made  Will continue to monitor and provide support  Q 7 minute safety and behavior checks maintained  Pateint slept thru night non labored breathing noted

## 2022-05-25 NOTE — NURSING NOTE
Pt being discharged with the following belongings:    1 pair of shoes  1 pair of socks  Pants x1  Sweater x1  t-shirt x1  lighter x1  cigarettes x16  $5 00 cash     Reviewed belongings with patient and signed belongings checklist

## 2022-05-25 NOTE — PLAN OF CARE
Problem: Alteration in Thoughts and Perception  Goal: Verbalize thoughts and feelings  Description: Interventions:  - Promote a nonjudgmental and trusting relationship with the patient through active listening and therapeutic communication  - Assess patient's level of functioning, behavior and potential for risk  - Engage patient in 1 on 1 interactions  - Encourage patient to express fears, feelings, frustrations, and discuss symptoms    - [place] patient to reality, help patient recognize reality-based thinking   - Administer medications as ordered and assess for potential side effects  - Provide the patient education related to the signs and symptoms of the illness and desired effects of prescribed medications  Outcome: Progressing  Goal: Attend and participate in unit activities, including therapeutic, recreational, and educational groups  Description: Interventions:  -Encourage Visitation and family involvement in care  Outcome: Progressing  Goal: Complete daily ADLs, including personal hygiene independently, as able  Description: Interventions:  - Observe, teach, and assist patient with ADLS  - Monitor and promote a balance of rest/activity, with adequate nutrition and elimination   Outcome: Progressing     Problem: Depression  Goal: Treatment Goal: Demonstrate behavioral control of depressive symptoms, verbalize feelings of improved mood/affect, and adopt new coping skills prior to discharge  Outcome: Progressing  Goal: Verbalize thoughts and feelings  Description: Interventions:  - Assess and re-assess patient's level of risk   - Engage patient in 1:1 interactions, daily, for a minimum of 15 minutes   - Encourage patient to express feelings, fears, frustrations, hopes   Outcome: Progressing  Goal: Refrain from isolation  Description: Interventions:  - Develop a trusting relationship   - Encourage socialization   Outcome: Progressing     Problem: Risk for Violence/Aggression Toward Others  Goal: Refrain from harming others  Outcome: Progressing  Goal: Control angry outbursts  Description: Interventions:  - Monitor patient closely, per order  - Ensure early verbal de-escalation  - Monitor prn medication needs  - Set reasonable/therapeutic limits, outline behavioral expectations, and consequences   - Provide a non-threatening milieu, utilizing the least restrictive interventions   Outcome: Progressing

## 2022-05-25 NOTE — PROGRESS NOTES
Progress Note - Jane Montiel 31 y o  male MRN: [id number]    Unit/Bed#: [facility] 251-01 Encounter: [id number]        Subjective:   Patient seen and examined at bedside after reviewing the chart and discussing the case with the caring staff  Patient examined at bedside  Patient has no acute complaints  Patient is a possible discharge either today 05/25/2022 or tomorrow 05/26/2022  Patient is requesting all his prescriptions  I reviewed and reconciled patient's problem list and medications  Physical Exam   Vitals: Blood pressure 108/59, pulse 80, temperature 98 9 °F (37 2 °C), temperature source Temporal, resp  rate 18, height 5' 7" (1 702 m), weight 77 9 kg (171 lb 12 8 oz), SpO2 98 %  ,Body mass index is 26 91 kg/m²  Constitutional:  Patient appears in no acute distress  HEENT: PERR, EOMI, MMM  Cardiovascular:  Regular rate, regular rhythm  Pulmonary/Chest: Effort normal and breath sounds normal    Abdomen: Soft, + BS, NT  Assessment/Plan:  Jane Montiel is a(n) 31y o  year old male with schizoaffective disorder bipolar type      MEDICAL CLEARANCE  Patient is medically cleared for discharge  All scripts will be sent out for the patient  1  Tobacco abuse  Patient has been put on nicotine transdermal patch 7 mg daily  2  Arthritis/headache  Patient may get Tylenol on as needed basis  3  GERD  Patient may take Mylanta as needed  4  Insomnia  Patient may get trazodone as needed  5  Vitamin-D deficiency  Patient started on vitamin D2 31104 units weekly for 14 weeks followed by vitamin D3 1000 units daily  6  Vitamin B12 deficiency  Patient started on vitamin B12 supplement  7  Tachycardia  Patient started on metoprolol tartrate 25 mg twice daily on 04/23/2022  Continue to monitor closely

## 2022-05-25 NOTE — NURSING NOTE
Pt AAOX4, calm, pleasant, and denies all SI, HI, AVH  All belongings returned and discharge education provided  Pt escorted to ride by [facility]

## 2022-05-25 NOTE — NURSING NOTE
Patient visible on unit at times  Withdrawn to room  Denies SI,HI,AVH, anxiety or depression  Patient pleasant and cooperative  Medication compliant  Safety checks continue Q 7 minutes

## 2022-05-25 NOTE — BH TRANSITION RECORD
Contact Information: If you have any questions, concerns, pended studies, tests and/or procedures, or emergencies regarding your inpatient behavioral health visit  Please contact Me[facility] Colon" [facility] behavioral health unit [phone] and ask to speak to a , nurse or physician  A contact is available 24 hours/ 7 days a week at this number  Summary of Procedures Performed During your Stay:  Below is a list of major procedures performed during your hospital stay and a summary of results:  - No major procedures performed  Pending Studies (From admission, onward)    None        If studies are pending at discharge, follow up with your PCP and/or referring provider
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
## 2022-05-26 NOTE — CASE MANAGEMENT
Case Management Discharge Planning Note    Patient name Sanket Hammonds  Location [facility] 251/[facility] 005-93 MRN [id number]  : 1995 Date 2022       Current Admission Date: 2022  Current Admission Diagnosis:Schizoaffective disorder, bipolar type [facility])   Patient Active Problem List    Diagnosis Date Noted    Schizoaffective disorder, bipolar type ([facility] [address] ) 2022    Subclinical hypothyroidism 2016      LOS (days): 37  Geometric Mean LOS (GMLOS) (days):   Days to GMLOS:     OBJECTIVE:  Risk of Unplanned Readmission Score: 20 23         Current admission status: Inpatient Psych   Preferred Pharmacy:   [address] [place] [address]  No address on file      [place] [address] [id number] [address]  Phone: [phone] Fax: [phone]    Primary Care Provider: No primary care provider on file  [place]  Secondary Insurance:     DISCHARGE DETAILS:      Patient is being discharged, no SI/HI, no psychosis or A/V  Patient is in agreement with discharge  No questions verbalized  Patient provided with after care appointment, discharge instructions and prescriptions  IMM, quality core measures, transition of care, discharge instructions, and treatment plan complete  Patient will be transported by   Gruvidanielle Vinicius will continue to follow up as needed  Patient's discharge was faxed to outpatient providers  Referral to [facility] completed and reviewed with patient  [facility] on  to complete his intake for Mental Health services in person  Referral for [facility] services was completed and faxed to [facility]  Patient will have CHRISTENSEN administered at [address] [place] [address]  Patient discharge instructions were reviewed with his mother and patient  Both are in agreement with discharge

## 2022-05-26 NOTE — PROGRESS NOTES
05/25/22 0915   Team Meeting   Meeting Type Daily Rounds   Team Members Present   Team Members Present Physician;Nurse;; Other (Discipline and Name)   Physician Team Member TORSTEN Capmos DR   Nursing Team Member Gian Acevedo   Social Work Team Member Tanner   Other (Discipline and Name) Yessenia Zheng [facility]   Patient/Family Present   Patient Present No   Patient's Family Present No     Visible, compliant, attends groups, denies everything

## 2023-10-04 NOTE — NURSING NOTE
Patient got up once around 2355 and medicated, patient slept after medication thru night without interruption non labored breathing noted  

## 2024-10-05 ENCOUNTER — APPOINTMENT (EMERGENCY)
Dept: RADIOLOGY | Facility: HOSPITAL | Age: 29
End: 2024-10-05
Payer: COMMERCIAL

## 2024-10-05 ENCOUNTER — HOSPITAL ENCOUNTER (EMERGENCY)
Facility: HOSPITAL | Age: 29
Discharge: HOME | End: 2024-10-05
Payer: COMMERCIAL

## 2024-10-05 VITALS
BODY MASS INDEX: 23.79 KG/M2 | HEART RATE: 93 BPM | DIASTOLIC BLOOD PRESSURE: 83 MMHG | OXYGEN SATURATION: 97 % | RESPIRATION RATE: 18 BRPM | WEIGHT: 151.9 LBS | SYSTOLIC BLOOD PRESSURE: 135 MMHG

## 2024-10-05 DIAGNOSIS — S20.212A RIB CONTUSION, LEFT, INITIAL ENCOUNTER: Primary | ICD-10-CM

## 2024-10-05 PROCEDURE — 99284 EMERGENCY DEPT VISIT MOD MDM: CPT

## 2024-10-05 PROCEDURE — 71046 X-RAY EXAM CHEST 2 VIEWS: CPT

## 2024-10-05 PROCEDURE — 99283 EMERGENCY DEPT VISIT LOW MDM: CPT

## 2024-10-05 PROCEDURE — 96372 THER/PROPH/DIAG INJ SC/IM: CPT

## 2024-10-05 RX ORDER — LIDOCAINE/PRILOCAINE 2.5 %-2.5%
CREAM (GRAM) TOPICAL AS NEEDED
Qty: 30 G | Refills: 0 | Status: SHIPPED | OUTPATIENT
Start: 2024-10-05 | End: 2024-10-12

## 2024-10-05 RX ORDER — METHOCARBAMOL 500 MG/1
500 TABLET, FILM COATED ORAL 2 TIMES DAILY
Qty: 20 TABLET | Refills: 0 | Status: SHIPPED | OUTPATIENT
Start: 2024-10-05

## 2024-10-05 RX ORDER — ACETAMINOPHEN 500 MG
1000 TABLET ORAL EVERY 6 HOURS PRN
Qty: 30 TABLET | Refills: 0 | Status: SHIPPED | OUTPATIENT
Start: 2024-10-05

## 2024-10-05 RX ORDER — KETOROLAC TROMETHAMINE 30 MG/ML
15 INJECTION, SOLUTION INTRAMUSCULAR; INTRAVENOUS ONCE
Status: COMPLETED | OUTPATIENT
Start: 2024-10-05 | End: 2024-10-05

## 2024-10-05 RX ORDER — IBUPROFEN 600 MG/1
600 TABLET, FILM COATED ORAL EVERY 6 HOURS PRN
Qty: 30 TABLET | Refills: 0 | Status: SHIPPED | OUTPATIENT
Start: 2024-10-05

## 2024-10-05 RX ADMIN — KETOROLAC TROMETHAMINE 15 MG: 30 INJECTION, SOLUTION INTRAMUSCULAR; INTRAVENOUS at 03:08
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
## 2024-10-05 NOTE — DISCHARGE INSTRUCTIONS
Take medication as prescribed  Follow-up with PCP in outpatient setting  Return to emergency department increasing fever, body aches, chills, shortness of breath, chest pain, palpitations, or any intractable nausea vomiting or diarrhea.